# Patient Record
Sex: MALE | Race: WHITE | NOT HISPANIC OR LATINO | Employment: FULL TIME | URBAN - METROPOLITAN AREA
[De-identification: names, ages, dates, MRNs, and addresses within clinical notes are randomized per-mention and may not be internally consistent; named-entity substitution may affect disease eponyms.]

---

## 2019-02-27 ENCOUNTER — HOSPITAL ENCOUNTER (EMERGENCY)
Facility: HOSPITAL | Age: 39
Discharge: HOME | End: 2019-02-27
Attending: EMERGENCY MEDICINE
Payer: COMMERCIAL

## 2019-02-27 VITALS
TEMPERATURE: 98.3 F | OXYGEN SATURATION: 98 % | BODY MASS INDEX: 30.06 KG/M2 | DIASTOLIC BLOOD PRESSURE: 91 MMHG | RESPIRATION RATE: 18 BRPM | HEIGHT: 70 IN | WEIGHT: 210 LBS | SYSTOLIC BLOOD PRESSURE: 168 MMHG | HEART RATE: 68 BPM

## 2019-02-27 DIAGNOSIS — R31.9 HEMATURIA: Primary | ICD-10-CM

## 2019-02-27 LAB
ANION GAP SERPL CALCULATED.3IONS-SCNC: 9 MMOL/L (ref 4–13)
APTT PPP: 32 SECONDS (ref 26–38)
BASOPHILS # BLD AUTO: 0.06 THOUSANDS/ΜL (ref 0–0.1)
BASOPHILS NFR BLD AUTO: 1 % (ref 0–1)
BILIRUB UR QL STRIP: NEGATIVE
BUN SERPL-MCNC: 15 MG/DL (ref 5–25)
CALCIUM SERPL-MCNC: 8.7 MG/DL (ref 8.3–10.1)
CHLORIDE SERPL-SCNC: 104 MMOL/L (ref 100–108)
CLARITY UR: CLEAR
CO2 SERPL-SCNC: 29 MMOL/L (ref 21–32)
COLOR UR: YELLOW
CREAT SERPL-MCNC: 1.34 MG/DL (ref 0.6–1.3)
EOSINOPHIL # BLD AUTO: 0.19 THOUSAND/ΜL (ref 0–0.61)
EOSINOPHIL NFR BLD AUTO: 2 % (ref 0–6)
ERYTHROCYTE [DISTWIDTH] IN BLOOD BY AUTOMATED COUNT: 12.9 % (ref 11.6–15.1)
GFR SERPL CREATININE-BSD FRML MDRD: 66 ML/MIN/1.73SQ M
GLUCOSE SERPL-MCNC: 92 MG/DL (ref 65–140)
GLUCOSE UR STRIP-MCNC: NEGATIVE MG/DL
HCT VFR BLD AUTO: 47.6 % (ref 36.5–49.3)
HGB BLD-MCNC: 15.9 G/DL (ref 12–17)
HGB UR QL STRIP.AUTO: NEGATIVE
IMM GRANULOCYTES # BLD AUTO: 0.02 THOUSAND/UL (ref 0–0.2)
IMM GRANULOCYTES NFR BLD AUTO: 0 % (ref 0–2)
INR PPP: 1.02 (ref 0.86–1.17)
KETONES UR STRIP-MCNC: NEGATIVE MG/DL
LEUKOCYTE ESTERASE UR QL STRIP: NEGATIVE
LYMPHOCYTES # BLD AUTO: 2.88 THOUSANDS/ΜL (ref 0.6–4.47)
LYMPHOCYTES NFR BLD AUTO: 26 % (ref 14–44)
MCH RBC QN AUTO: 28.1 PG (ref 26.8–34.3)
MCHC RBC AUTO-ENTMCNC: 33.4 G/DL (ref 31.4–37.4)
MCV RBC AUTO: 84 FL (ref 82–98)
MONOCYTES # BLD AUTO: 0.75 THOUSAND/ΜL (ref 0.17–1.22)
MONOCYTES NFR BLD AUTO: 7 % (ref 4–12)
NEUTROPHILS # BLD AUTO: 7.15 THOUSANDS/ΜL (ref 1.85–7.62)
NEUTS SEG NFR BLD AUTO: 64 % (ref 43–75)
NITRITE UR QL STRIP: NEGATIVE
NRBC BLD AUTO-RTO: 0 /100 WBCS
PH UR STRIP.AUTO: 7 [PH] (ref 4.5–8)
PLATELET # BLD AUTO: 264 THOUSANDS/UL (ref 149–390)
PMV BLD AUTO: 10.5 FL (ref 8.9–12.7)
POTASSIUM SERPL-SCNC: 3.6 MMOL/L (ref 3.5–5.3)
PROT UR STRIP-MCNC: NEGATIVE MG/DL
PROTHROMBIN TIME: 12.8 SECONDS (ref 11.8–14.2)
RBC # BLD AUTO: 5.65 MILLION/UL (ref 3.88–5.62)
SODIUM SERPL-SCNC: 142 MMOL/L (ref 136–145)
SP GR UR STRIP.AUTO: 1.02 (ref 1–1.03)
UROBILINOGEN UR QL STRIP.AUTO: 0.2 E.U./DL
WBC # BLD AUTO: 11.05 THOUSAND/UL (ref 4.31–10.16)

## 2019-02-27 PROCEDURE — 99283 EMERGENCY DEPT VISIT LOW MDM: CPT

## 2019-02-27 PROCEDURE — 85730 THROMBOPLASTIN TIME PARTIAL: CPT | Performed by: EMERGENCY MEDICINE

## 2019-02-27 PROCEDURE — 81003 URINALYSIS AUTO W/O SCOPE: CPT | Performed by: EMERGENCY MEDICINE

## 2019-02-27 PROCEDURE — 85025 COMPLETE CBC W/AUTO DIFF WBC: CPT | Performed by: EMERGENCY MEDICINE

## 2019-02-27 PROCEDURE — 96360 HYDRATION IV INFUSION INIT: CPT

## 2019-02-27 PROCEDURE — 85610 PROTHROMBIN TIME: CPT | Performed by: EMERGENCY MEDICINE

## 2019-02-27 PROCEDURE — 36415 COLL VENOUS BLD VENIPUNCTURE: CPT | Performed by: EMERGENCY MEDICINE

## 2019-02-27 PROCEDURE — 87491 CHLMYD TRACH DNA AMP PROBE: CPT | Performed by: EMERGENCY MEDICINE

## 2019-02-27 PROCEDURE — 80048 BASIC METABOLIC PNL TOTAL CA: CPT | Performed by: EMERGENCY MEDICINE

## 2019-02-27 PROCEDURE — 87591 N.GONORRHOEAE DNA AMP PROB: CPT | Performed by: EMERGENCY MEDICINE

## 2019-02-27 RX ADMIN — SODIUM CHLORIDE 500 ML: 0.9 INJECTION, SOLUTION INTRAVENOUS at 15:00

## 2019-02-27 NOTE — ED PROVIDER NOTES
History  Chief Complaint   Patient presents with    Blood in Urine     Presents to ED for evaluation of one episode of hematuria 4 days ago after heavy lifting  Pain with urination noted at this time  Low back pain has resolved  Denies any fever or chills  History provided by:  Patient   used: No      Patient is a 40-year-old male presenting to emergency department with hematuria  On Thursday was lifting boxes  Had some lower back pain  Had bloody urine Friday  No back pain at this time  No back pain since Thursday  Is having intermittent dysuria  No fevers or chills  No nausea vomiting  No abdominal pain  No history of kidney stones  History of cyst on the kidney  Has not seen urologist     MDM check kidney function, UA, GC Chlamydia  Patient will follow up with Urology  None       History reviewed  No pertinent past medical history  History reviewed  No pertinent surgical history  History reviewed  No pertinent family history  I have reviewed and agree with the history as documented  Social History     Tobacco Use    Smoking status: Current Every Day Smoker     Packs/day: 0 50    Smokeless tobacco: Never Used   Substance Use Topics    Alcohol use: Yes     Comment: social    Drug use: Never        Review of Systems   Constitutional: Negative for chills, diaphoresis and fever  Respiratory: Negative for cough, shortness of breath, wheezing and stridor  Cardiovascular: Negative for chest pain, palpitations and leg swelling  Gastrointestinal: Negative for abdominal pain, blood in stool, diarrhea, nausea and vomiting  Genitourinary: Positive for dysuria and hematuria  Negative for frequency and urgency  Musculoskeletal: Negative for neck stiffness  Skin: Negative for pallor and rash  Neurological: Negative for dizziness, syncope, weakness, light-headedness and headaches  All other systems reviewed and are negative        Physical Exam  Physical Exam Constitutional: He is oriented to person, place, and time  He appears well-developed and well-nourished  HENT:   Head: Normocephalic and atraumatic  Eyes: Pupils are equal, round, and reactive to light  Neck: Neck supple  Cardiovascular: Normal rate, regular rhythm, normal heart sounds and intact distal pulses  Pulmonary/Chest: Effort normal and breath sounds normal  No respiratory distress  Abdominal: Soft  Bowel sounds are normal  There is no tenderness  Musculoskeletal: Normal range of motion  He exhibits no edema, tenderness or deformity  Neurological: He is alert and oriented to person, place, and time  Skin: Skin is warm and dry  Capillary refill takes less than 2 seconds  No rash noted  No erythema  No pallor  Vitals reviewed        Vital Signs  ED Triage Vitals [02/27/19 1421]   Temperature Pulse Respirations Blood Pressure SpO2   98 3 °F (36 8 °C) 73 20 (!) 175/96 98 %      Temp Source Heart Rate Source Patient Position - Orthostatic VS BP Location FiO2 (%)   Tympanic Monitor Sitting Right arm --      Pain Score       No Pain           Vitals:    02/27/19 1421 02/27/19 1602   BP: (!) 175/96 168/91   Pulse: 73 68   Patient Position - Orthostatic VS: Sitting Lying       Visual Acuity      ED Medications  Medications   sodium chloride 0 9 % bolus 500 mL (0 mL Intravenous Stopped 2/27/19 1555)       Diagnostic Studies  Results Reviewed     Procedure Component Value Units Date/Time    Protime-INR [[id number]]  (Normal) Collected:  02/27/19 1445    Lab Status:  Final result Specimen:  Blood from Line, Venous Updated:  02/27/19 1517     Protime 12 8 seconds      INR 1 02    APTT [[id number]]  (Normal) Collected:  02/27/19 1445    Lab Status:  Final result Specimen:  Blood from Line, Venous Updated:  02/27/19 1517     PTT 32 seconds     Basic metabolic panel [[id number]]  (Abnormal) Collected:  02/27/19 1445    Lab Status:  Final result Specimen:  Blood from Line, Venous Updated:  02/27/19 1515 Sodium 142 mmol/L      Potassium 3 6 mmol/L      Chloride 104 mmol/L      CO2 29 mmol/L      ANION GAP 9 mmol/L      BUN 15 mg/dL      Creatinine 1 34 mg/dL      Glucose 92 mg/dL      Calcium 8 7 mg/dL      eGFR 66 ml/min/1 73sq m     Narrative:       National Kidney Disease Education Program recommendations are as follows:  GFR calculation is accurate only with a steady state creatinine  Chronic Kidney disease less than 60 ml/min/1 73 sq  meters  Kidney failure less than 15 ml/min/1 73 sq  meters  UA w Reflex to Microscopic w Reflex to Culture [[id number]] Collected:  02/27/19 1440    Lab Status:  Final result Specimen:  Urine, Other Updated:  02/27/19 1515     Color, UA Yellow     Clarity, UA Clear     Specific [place], UA 1 020     pH, UA 7 0     Leukocytes, UA Negative     Nitrite, UA Negative     Protein, UA Negative mg/dl      Glucose, UA Negative mg/dl      Ketones, UA Negative mg/dl      Urobilinogen, UA 0 2 E U /dl      Bilirubin, UA Negative     Blood, UA Negative    Chlamydia/GC amplified DNA by PCR [[id number]] Collected:  02/27/19 1440    Lab Status:   In process Specimen:  Urine, Other Updated:  02/27/19 1511    CBC and differential [[id number]]  (Abnormal) Collected:  02/27/19 1445    Lab Status:  Final result Specimen:  Blood from Line, Venous Updated:  02/27/19 1504     WBC 11 05 Thousand/uL      RBC 5 65 Million/uL      Hemoglobin 15 9 g/dL      Hematocrit 47 6 %      MCV 84 fL      MCH 28 1 pg      MCHC 33 4 g/dL      RDW 12 9 %      MPV 10 5 fL      Platelets 138 Thousands/uL      nRBC 0 /100 WBCs      Neutrophils Relative 64 %      Immat GRANS % 0 %      Lymphocytes Relative 26 %      Monocytes Relative 7 %      Eosinophils Relative 2 %      Basophils Relative 1 %      Neutrophils Absolute 7 15 Thousands/µL      Immature Grans Absolute 0 02 Thousand/uL      Lymphocytes Absolute 2 88 Thousands/µL      Monocytes Absolute 0 75 Thousand/µL      Eosinophils Absolute 0 19 Thousand/µL      Basophils Absolute 0 06 Thousands/µL                  No orders to display              Procedures  Procedures       Phone Contacts  ED Phone Contact    ED Course                               MDM    Disposition  Final diagnoses:   Hematuria     Time reflects when diagnosis was documented in both MDM as applicable and the Disposition within this note     Time User Action Codes Description Comment    2/27/2019  3:36 PM Amber Kunz Add [R31 9] Hematuria       ED Disposition     ED Disposition Condition Date/Time Comment    Discharge Stable Wed Feb 27, 2019  3:36 PM Shayy Tanner discharge to home/self care  Follow-up Information     Follow up With Specialties Details Why Contact Info Additional [address] [facility] Emergency Department Emergency Medicine  As needed, If symptoms worsen, back pain, fevers, vomiting or feel worse overall [address]  3441 [facility] [address] ED, [address], [facility]47 4000 [facility] Urology [facility] Urology Schedule an appointment as soon as possible for a visit in 1 week Hematuria, known renal cysts [address] [address] Sammy ACUÑA [facility] [address]  [address] [place] [address] Urology [address], [address]  Call  To get a family doctor [phone]             There are no discharge medications for this patient  No discharge procedures on file      ED Provider  Electronically Signed by           Sri Richmond MD  02/27/19 2004

## 2019-02-28 LAB
C TRACH DNA SPEC QL NAA+PROBE: NEGATIVE
N GONORRHOEA DNA SPEC QL NAA+PROBE: NEGATIVE

## 2019-08-09 ENCOUNTER — HOSPITAL ENCOUNTER (EMERGENCY)
Facility: HOSPITAL | Age: 39
Discharge: HOME | End: 2019-08-09
Attending: EMERGENCY MEDICINE | Admitting: EMERGENCY MEDICINE

## 2019-08-09 VITALS
SYSTOLIC BLOOD PRESSURE: 132 MMHG | HEART RATE: 77 BPM | TEMPERATURE: 98.1 F | BODY MASS INDEX: 30.15 KG/M2 | WEIGHT: 210.1 LBS | DIASTOLIC BLOOD PRESSURE: 77 MMHG | RESPIRATION RATE: 20 BRPM | OXYGEN SATURATION: 100 %

## 2019-08-09 DIAGNOSIS — H11.31 NON-TRAUMATIC SUBCONJUNCTIVAL HEMORRHAGE OF RIGHT EYE: ICD-10-CM

## 2019-08-09 DIAGNOSIS — M54.42 ACUTE BILATERAL LOW BACK PAIN WITH LEFT-SIDED SCIATICA: Primary | ICD-10-CM

## 2019-08-09 PROCEDURE — 99283 EMERGENCY DEPT VISIT LOW MDM: CPT

## 2019-08-09 PROCEDURE — 99284 EMERGENCY DEPT VISIT MOD MDM: CPT | Performed by: PHYSICIAN ASSISTANT

## 2019-08-09 RX ORDER — CYCLOBENZAPRINE HCL 10 MG
10 TABLET ORAL 2 TIMES DAILY PRN
Qty: 15 TABLET | Refills: 0 | Status: SHIPPED | OUTPATIENT
Start: 2019-08-09 | End: 2019-09-08

## 2019-08-09 RX ORDER — MELOXICAM 15 MG/1
15 TABLET ORAL DAILY
Qty: 14 TABLET | Refills: 0 | Status: SHIPPED | OUTPATIENT
Start: 2019-08-09 | End: 2019-09-08

## 2019-08-09 NOTE — ED PROVIDER NOTES
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 and while in one of the rooms, he suddenly developed R eye discomfort and blurred vision  This occurred yesterday  It has not improved  Does not wear contacts  None       Past Medical History:   Diagnosis Date    Chronic pain        History reviewed  No pertinent surgical history  History reviewed  No pertinent family history  I have reviewed and agree with the history as documented  Social History     Tobacco Use    Smoking status: Current Every Day Smoker     Packs/day: 0 50    Smokeless tobacco: Never Used   Substance Use Topics    Alcohol use: Yes     Comment: social    Drug use: Never        Review of Systems   Constitutional: Negative for appetite change, chills, diaphoresis and fever  Eyes: Positive for pain, redness and visual disturbance  Negative for photophobia and discharge  Respiratory: Negative for shortness of breath  Cardiovascular: Negative for chest pain  Gastrointestinal: Negative for diarrhea, nausea and vomiting  Genitourinary: Negative for difficulty urinating  Musculoskeletal: Positive for back pain  Negative for arthralgias and myalgias  Skin: Negative for color change and rash  Allergic/Immunologic: Negative for immunocompromised state  Neurological: Negative for weakness and numbness  Psychiatric/Behavioral: Negative for confusion         Physical Exam  Physical Exam Constitutional: He is oriented to person, place, and time  He appears well-developed and well-nourished  No distress  HENT:   Head: Normocephalic and atraumatic  Mouth/Throat: Oropharynx is clear and moist    Eyes: EOM are normal  Right eye exhibits no chemosis  Left eye exhibits no chemosis  Right conjunctiva is not injected  Left conjunctiva is not injected  No scleral icterus  Small R subconjunctival hemorrhage lateral to the iris  No fluorescein dye uptake, no hyphema/hypopyon    OS 20/20  OD 20/30  OU 20/20   Neck: No JVD present  Cardiovascular: Normal rate and regular rhythm  Exam reveals no gallop and no friction rub  No murmur heard  Pulmonary/Chest: No respiratory distress  He has no wheezes  He has no rales  Abdominal: Soft  Bowel sounds are normal  He exhibits no distension and no mass  There is no tenderness  There is no guarding  Musculoskeletal:        Lumbar back: He exhibits tenderness (L paraspinal)  He exhibits normal range of motion, no bony tenderness and no deformity  Neurological: He is alert and oriented to person, place, and time  Skin: Skin is warm and dry  Capillary refill takes less than 2 seconds  He is not diaphoretic  Psychiatric: He has a normal mood and affect  His behavior is normal    Vitals reviewed        Vital Signs  ED Triage Vitals [08/09/19 1330]   Temperature Pulse Respirations Blood Pressure SpO2   98 1 °F (36 7 °C) 77 20 132/77 100 %      Temp Source Heart Rate Source Patient Position - Orthostatic VS BP Location FiO2 (%)   Tympanic Monitor Sitting Right arm --      Pain Score       7           Vitals:    08/09/19 1330   BP: 132/77   Pulse: 77   Patient Position - Orthostatic VS: Sitting         Visual Acuity      ED Medications  Medications - No data to display    Diagnostic Studies  Results Reviewed     None                 No orders to display              Procedures  Procedures       ED Course                               MDM  Number of Diagnoses or Management Options  Acute bilateral low back pain with left-sided sciatica: new and does not require workup  Non-traumatic subconjunctival hemorrhage of right eye: new and does not require workup  Diagnosis management comments: 35 male presents w/ mechanical LBP with L sided radiculopathy  No evidence of iritis/keratitis on ocular exam, visual acuity intact  Disposition  Final diagnoses:   Acute bilateral low back pain with left-sided sciatica   Non-traumatic subconjunctival hemorrhage of right eye     Time reflects when diagnosis was documented in both MDM as applicable and the Disposition within this note     Time User Action Codes Description Comment    8/9/2019  2:04 PM Tere Hancock Add [M54 42] Acute bilateral low back pain with left-sided sciatica     8/9/2019  2:04 PM Tere Hancock Add [H11 31] Non-traumatic subconjunctival hemorrhage of right eye       ED Disposition     ED Disposition Condition Date/Time Comment    Discharge Stable Fri Aug 9, 2019  2:04 PM Quiana Grounds discharge to home/self care  Follow-up Information     Follow up With Specialties Details Why Contact Info Additional [address] [facility]   [address] [facility] [address] [facility] 124 [facility] 60 , [address], [facility]          Discharge Medication List as of 8/9/2019  2:05 PM      START taking these medications    Details   cyclobenzaprine (FLEXERIL) 10 mg tablet Take 1 tablet (10 mg total) by mouth 2 (two) times a day as needed for muscle spasms, Starting Fri 8/9/2019, Print      meloxicam (MOBIC) 15 mg tablet Take 1 tablet (15 mg total) by mouth daily for 14 days, Starting Fri 8/9/2019, Until Fri 8/23/2019, Print           No discharge procedures on file      ED Provider  Electronically Signed by           Florida Stevens PA-C  08/11/19 8280

## 2019-09-08 ENCOUNTER — APPOINTMENT (EMERGENCY)
Dept: RADIOLOGY | Facility: HOSPITAL | Age: 39
End: 2019-09-08
Payer: COMMERCIAL

## 2019-09-08 ENCOUNTER — HOSPITAL ENCOUNTER (EMERGENCY)
Facility: HOSPITAL | Age: 39
Discharge: HOME | End: 2019-09-08
Attending: EMERGENCY MEDICINE | Admitting: EMERGENCY MEDICINE
Payer: COMMERCIAL

## 2019-09-08 VITALS
HEART RATE: 63 BPM | HEIGHT: 70 IN | BODY MASS INDEX: 30.08 KG/M2 | SYSTOLIC BLOOD PRESSURE: 169 MMHG | OXYGEN SATURATION: 98 % | WEIGHT: 210.1 LBS | TEMPERATURE: 97.5 F | DIASTOLIC BLOOD PRESSURE: 91 MMHG | RESPIRATION RATE: 16 BRPM

## 2019-09-08 DIAGNOSIS — S43.102A AC SEPARATION, LEFT, INITIAL ENCOUNTER: ICD-10-CM

## 2019-09-08 DIAGNOSIS — J02.9 PHARYNGITIS: Primary | ICD-10-CM

## 2019-09-08 LAB — S PYO AG THROAT QL: NEGATIVE

## 2019-09-08 PROCEDURE — 87430 STREP A AG IA: CPT | Performed by: PHYSICIAN ASSISTANT

## 2019-09-08 PROCEDURE — 73030 X-RAY EXAM OF SHOULDER: CPT

## 2019-09-08 PROCEDURE — 99283 EMERGENCY DEPT VISIT LOW MDM: CPT

## 2019-09-08 PROCEDURE — 99285 EMERGENCY DEPT VISIT HI MDM: CPT | Performed by: PHYSICIAN ASSISTANT

## 2019-09-09 NOTE — DISCHARGE INSTRUCTIONS
Rest, ice, elevate arm  Motrin 600mg every 6 hours as needed for pain  Follow up with ortho this week for recheck

## 2019-09-09 NOTE — ED PROVIDER NOTES
History  Chief Complaint   Patient presents with    Neck Pain     patient presents to ED with left sided neck pain, tenderness for the past month  Pt states sharp pain beginning approx one month ago after smoking a full pack of cigarettes   Shoulder Pain     Patient states he was at work approx one month ago at work developed sharp pain in left collar bone and left shoulder after lifting 250 lb heater     Patient is a 34 y/o M that presents to the ED with left shoulder pain x 3 weeks  He states he hurt it at work lifting a 250lb heater  Pain is worse with movement, better with rest  He has not taken anything for pain  He states he has tingling in his fingertips  No prior injury to left shoulder  He is left handed  He also c/o enlarged lymph node x 3weeks on left side of neck  He states he has had a sore throat that started after smoking too many cigarettes  He also had a dental procedure left lower tooth and has had sensitivity in that tooth since the procedure  He denies facial swelling  No trouble swallowing or breathing         History provided by:  Patient  Neck Pain   Pain location:  L side  Quality:  Aching  Pain radiates to:  Does not radiate  Pain severity:  Mild  Onset quality:  Gradual  Duration:  3 weeks  Timing:  Constant  Progression:  Unchanged  Chronicity:  New  Relieved by:  Nothing  Worsened by:  Nothing  Ineffective treatments:  None tried  Associated symptoms: no bladder incontinence, no bowel incontinence, no chest pain, no fever, no leg pain, no numbness, no tingling and no weakness    Shoulder Pain   Location:  Shoulder  Shoulder location:  L shoulder  Injury: yes    Time since incident:  3 weeks  Mechanism of injury comment:  Heavy lifting  Pain details:     Quality:  Aching    Radiates to:  Does not radiate    Severity:  Mild    Onset quality:  Gradual    Duration:  3 weeks    Timing:  Constant    Progression:  Unchanged  Handedness:  Left-handed  Dislocation: no    Prior injury to area:  No  Relieved by:  Rest  Worsened by: Movement  Ineffective treatments:  None tried  Associated symptoms: neck pain and tingling (in fingertips)    Associated symptoms: no back pain, no decreased range of motion, no fever, no numbness and no swelling        None       Past Medical History:   Diagnosis Date    Chronic pain        History reviewed  No pertinent surgical history  History reviewed  No pertinent family history  I have reviewed and agree with the history as documented  Social History     Tobacco Use    Smoking status: Current Every Day Smoker     Packs/day: 0 50    Smokeless tobacco: Never Used   Substance Use Topics    Alcohol use: Yes     Comment: social    Drug use: Never        Review of Systems   Constitutional: Negative for chills and fever  HENT: Positive for sore throat  Respiratory: Negative for cough and shortness of breath  Cardiovascular: Negative for chest pain and leg swelling  Gastrointestinal: Negative for bowel incontinence  Genitourinary: Negative for bladder incontinence  Musculoskeletal: Positive for neck pain  Negative for back pain  Skin: Negative for color change and rash  Neurological: Negative for dizziness, tingling, weakness and numbness  Psychiatric/Behavioral: Negative for confusion  All other systems reviewed and are negative  Physical Exam  Physical Exam   Constitutional: He is oriented to person, place, and time  He appears well-developed and well-nourished  He is cooperative  He does not appear ill  No distress  HENT:   Head: Normocephalic and atraumatic  Nose: Nose normal    Mouth/Throat: Oropharynx is clear and moist and mucous membranes are normal  No tonsillar exudate  TM obscured by cerumen B/L  Eyes: Conjunctivae are normal    Neck: Normal range of motion  Neck supple  No spinous process tenderness and no muscular tenderness present  No thyroid mass present     Cardiovascular: Normal rate, regular rhythm and normal heart sounds  No murmur heard  Pulmonary/Chest: Effort normal and breath sounds normal  He has no wheezes  He has no rhonchi  He has no rales  Abdominal: Soft  Normal appearance and bowel sounds are normal  There is no tenderness  Musculoskeletal:        Left shoulder: He exhibits tenderness and bony tenderness  He exhibits normal range of motion, no swelling, no deformity and normal pulse  Left elbow: Normal    Lymphadenopathy:     He has cervical adenopathy  Left cervical: Superficial cervical adenopathy present  Neurological: He is alert and oriented to person, place, and time  He has normal strength  No sensory deficit  Skin: Skin is warm and dry  No rash noted  He is not diaphoretic  No erythema  No pallor  Nursing note and vitals reviewed  Vital Signs  ED Triage Vitals [09/08/19 2021]   Temperature Pulse Respirations Blood Pressure SpO2   97 5 °F (36 4 °C) 63 16 169/91 98 %      Temp Source Heart Rate Source Patient Position - Orthostatic VS BP Location FiO2 (%)   Tympanic Monitor Lying Right arm --      Pain Score       --           Vitals:    09/08/19 2021   BP: 169/91   Pulse: 63   Patient Position - Orthostatic VS: Lying         Visual Acuity      ED Medications  Medications - No data to display    Diagnostic Studies  Results Reviewed     Procedure Component Value Units Date/Time    Rapid Strep A Screen Only, Adults [[id number]]  (Normal) Collected:  09/08/19 2059    Lab Status:  Final result Specimen:  Throat Updated:  09/08/19 2153     Rapid Strep A Screen Negative                 XR shoulder 2+ views LEFT   ED Interpretation by Merly Billings PA-C (09/08 2202)   AC separation                    Procedures  Procedures       ED Course                               MDM  Number of Diagnoses or Management Options  AC separation, left, initial encounter: new and requires workup  Pharyngitis: new and requires workup     Amount and/or Complexity of Data Reviewed  Clinical lab tests: ordered and reviewed  Tests in the radiology section of CPT®: reviewed and ordered  Independent visualization of images, tracings, or specimens: yes    Patient Progress  Patient progress: stable      Disposition  Final diagnoses:   Pharyngitis   AC separation, left, initial encounter     Time reflects when diagnosis was documented in both MDM as applicable and the Disposition within this note     Time User Action Codes Description Comment    9/8/2019 10:02 PM Jaylin Ly Add [J02 9] Pharyngitis     9/8/2019 10:02 PM Jaylin Ly Add [S43 102A] AC separation, left, initial encounter       ED Disposition     ED Disposition Condition Date/Time Comment    Discharge Stable Sun Sep 8, 2019 10:02 PM Ricardo Rankin discharge to home/self care  Follow-up Information     Follow up With Specialties Details Why Contact Info    Mana Stewart MD Orthopedic Surgery Call in 1 day For recheck Minor Mayes [phone] [facility]  [phone]            Patient's Medications   Discharge Prescriptions    No medications on file     No discharge procedures on file      ED Provider  Electronically Signed by           Yahir Sharp PA-C  09/08/19 1439

## 2019-09-26 ENCOUNTER — APPOINTMENT (EMERGENCY)
Dept: RADIOLOGY | Facility: HOSPITAL | Age: 39
End: 2019-09-26
Payer: COMMERCIAL

## 2019-09-26 ENCOUNTER — HOSPITAL ENCOUNTER (EMERGENCY)
Facility: HOSPITAL | Age: 39
Discharge: HOME | End: 2019-09-27
Attending: EMERGENCY MEDICINE
Payer: COMMERCIAL

## 2019-09-26 DIAGNOSIS — R07.89 ATYPICAL CHEST PAIN: ICD-10-CM

## 2019-09-26 DIAGNOSIS — R03.0 ELEVATED BLOOD PRESSURE READING: ICD-10-CM

## 2019-09-26 DIAGNOSIS — K21.9 GERD (GASTROESOPHAGEAL REFLUX DISEASE): Primary | ICD-10-CM

## 2019-09-26 LAB
ALBUMIN SERPL BCP-MCNC: 4.1 G/DL (ref 3.5–5)
ALP SERPL-CCNC: 103 U/L (ref 46–116)
ALT SERPL W P-5'-P-CCNC: 52 U/L (ref 12–78)
ANION GAP SERPL CALCULATED.3IONS-SCNC: 9 MMOL/L (ref 4–13)
AST SERPL W P-5'-P-CCNC: 20 U/L (ref 5–45)
BASOPHILS # BLD AUTO: 0.08 THOUSANDS/ΜL (ref 0–0.1)
BASOPHILS NFR BLD AUTO: 1 % (ref 0–1)
BILIRUB SERPL-MCNC: 0.4 MG/DL (ref 0.2–1)
BUN SERPL-MCNC: 17 MG/DL (ref 5–25)
CALCIUM SERPL-MCNC: 9.2 MG/DL (ref 8.3–10.1)
CHLORIDE SERPL-SCNC: 103 MMOL/L (ref 100–108)
CO2 SERPL-SCNC: 30 MMOL/L (ref 21–32)
CREAT SERPL-MCNC: 1.27 MG/DL (ref 0.6–1.3)
EOSINOPHIL # BLD AUTO: 0.31 THOUSAND/ΜL (ref 0–0.61)
EOSINOPHIL NFR BLD AUTO: 2 % (ref 0–6)
ERYTHROCYTE [DISTWIDTH] IN BLOOD BY AUTOMATED COUNT: 13.7 % (ref 11.6–15.1)
GFR SERPL CREATININE-BSD FRML MDRD: 70 ML/MIN/1.73SQ M
GLUCOSE SERPL-MCNC: 102 MG/DL (ref 65–140)
HCT VFR BLD AUTO: 51.2 % (ref 36.5–49.3)
HGB BLD-MCNC: 16.7 G/DL (ref 12–17)
IMM GRANULOCYTES # BLD AUTO: 0.08 THOUSAND/UL (ref 0–0.2)
IMM GRANULOCYTES NFR BLD AUTO: 1 % (ref 0–2)
LIPASE SERPL-CCNC: 240 U/L (ref 73–393)
LYMPHOCYTES # BLD AUTO: 4.74 THOUSANDS/ΜL (ref 0.6–4.47)
LYMPHOCYTES NFR BLD AUTO: 31 % (ref 14–44)
MCH RBC QN AUTO: 27.6 PG (ref 26.8–34.3)
MCHC RBC AUTO-ENTMCNC: 32.6 G/DL (ref 31.4–37.4)
MCV RBC AUTO: 85 FL (ref 82–98)
MONOCYTES # BLD AUTO: 1.45 THOUSAND/ΜL (ref 0.17–1.22)
MONOCYTES NFR BLD AUTO: 10 % (ref 4–12)
NEUTROPHILS # BLD AUTO: 8.44 THOUSANDS/ΜL (ref 1.85–7.62)
NEUTS SEG NFR BLD AUTO: 55 % (ref 43–75)
NRBC BLD AUTO-RTO: 0 /100 WBCS
PLATELET # BLD AUTO: 273 THOUSANDS/UL (ref 149–390)
PMV BLD AUTO: 10.4 FL (ref 8.9–12.7)
POTASSIUM SERPL-SCNC: 3.6 MMOL/L (ref 3.5–5.3)
PROT SERPL-MCNC: 7.6 G/DL (ref 6.4–8.2)
RBC # BLD AUTO: 6.06 MILLION/UL (ref 3.88–5.62)
SODIUM SERPL-SCNC: 142 MMOL/L (ref 136–145)
TROPONIN I SERPL-MCNC: <0.02 NG/ML
WBC # BLD AUTO: 15.1 THOUSAND/UL (ref 4.31–10.16)

## 2019-09-26 PROCEDURE — 83690 ASSAY OF LIPASE: CPT | Performed by: EMERGENCY MEDICINE

## 2019-09-26 PROCEDURE — 99285 EMERGENCY DEPT VISIT HI MDM: CPT

## 2019-09-26 PROCEDURE — 71046 X-RAY EXAM CHEST 2 VIEWS: CPT

## 2019-09-26 PROCEDURE — 84484 ASSAY OF TROPONIN QUANT: CPT | Performed by: EMERGENCY MEDICINE

## 2019-09-26 PROCEDURE — 85025 COMPLETE CBC W/AUTO DIFF WBC: CPT | Performed by: EMERGENCY MEDICINE

## 2019-09-26 PROCEDURE — 80053 COMPREHEN METABOLIC PANEL: CPT | Performed by: EMERGENCY MEDICINE

## 2019-09-26 PROCEDURE — 99284 EMERGENCY DEPT VISIT MOD MDM: CPT | Performed by: EMERGENCY MEDICINE

## 2019-09-26 PROCEDURE — 93005 ELECTROCARDIOGRAM TRACING: CPT

## 2019-09-26 PROCEDURE — 36415 COLL VENOUS BLD VENIPUNCTURE: CPT | Performed by: EMERGENCY MEDICINE

## 2019-09-26 RX ORDER — MAGNESIUM HYDROXIDE/ALUMINUM HYDROXICE/SIMETHICONE 120; 1200; 1200 MG/30ML; MG/30ML; MG/30ML
30 SUSPENSION ORAL ONCE
Status: COMPLETED | OUTPATIENT
Start: 2019-09-26 | End: 2019-09-26

## 2019-09-26 RX ORDER — LIDOCAINE HYDROCHLORIDE 20 MG/ML
10 SOLUTION OROPHARYNGEAL ONCE
Status: COMPLETED | OUTPATIENT
Start: 2019-09-26 | End: 2019-09-26

## 2019-09-26 RX ADMIN — LIDOCAINE HYDROCHLORIDE 10 ML: 20 SOLUTION ORAL; TOPICAL at 23:07

## 2019-09-26 RX ADMIN — ALUMINUM HYDROXIDE, MAGNESIUM HYDROXIDE, AND SIMETHICONE 30 ML: 200; 200; 20 SUSPENSION ORAL at 23:07

## 2019-09-27 VITALS
DIASTOLIC BLOOD PRESSURE: 92 MMHG | SYSTOLIC BLOOD PRESSURE: 155 MMHG | TEMPERATURE: 98.2 F | HEART RATE: 59 BPM | RESPIRATION RATE: 20 BRPM | OXYGEN SATURATION: 96 %

## 2019-09-27 LAB
ATRIAL RATE: 61 BPM
P AXIS: 44 DEGREES
PR INTERVAL: 136 MS
QRS AXIS: 43 DEGREES
QRSD INTERVAL: 94 MS
QT INTERVAL: 408 MS
QTC INTERVAL: 410 MS
T WAVE AXIS: 78 DEGREES
VENTRICULAR RATE: 61 BPM

## 2019-09-27 PROCEDURE — 93010 ELECTROCARDIOGRAM REPORT: CPT | Performed by: INTERNAL MEDICINE

## 2019-09-27 RX ORDER — PANTOPRAZOLE SODIUM 40 MG/1
40 TABLET, DELAYED RELEASE ORAL ONCE
Status: COMPLETED | OUTPATIENT
Start: 2019-09-27 | End: 2019-09-27

## 2019-09-27 RX ORDER — PANTOPRAZOLE SODIUM 20 MG/1
20 TABLET, DELAYED RELEASE ORAL DAILY
Qty: 12 TABLET | Refills: 0 | Status: SHIPPED | OUTPATIENT
Start: 2019-09-27 | End: 2019-10-25

## 2019-09-27 RX ADMIN — PANTOPRAZOLE SODIUM 40 MG: 40 TABLET, DELAYED RELEASE ORAL at 00:11

## 2019-09-27 NOTE — DISCHARGE INSTRUCTIONS
Chest Pain   WHAT YOU NEED TO KNOW:   Chest pain can be caused by a range of conditions, from not serious to life-threatening  Chest pain can be a symptom of a digestive problem, such as acid reflux or a stomach ulcer  An anxiety attack or a strong emotion, such as anger, can also cause chest pain  Infection, inflammation, or a fracture in the bones or cartilage in your chest can cause pain or discomfort  Sometimes chest pain or pressure is caused by poor blood flow to your heart (angina)  Chest pain may also be caused by life-threatening conditions such as a heart attack or blood clot in your lungs  DISCHARGE INSTRUCTIONS:   Call 911 if:   · You have any of the following signs of a heart attack:      ¨ Squeezing, pressure, or pain in your chest that lasts longer than 5 minutes or returns    ¨ Discomfort or pain in your back, neck, jaw, stomach, or arm     ¨ Trouble breathing    ¨ Nausea or vomiting    ¨ Lightheadedness or a sudden cold sweat, especially with chest pain or trouble breathing    Return to the emergency department if:   · You have chest discomfort that gets worse, even with medicine  · You cough or vomit blood  · Your bowel movements are black or bloody  · You cannot stop vomiting, or it hurts to swallow  Contact your healthcare provider if:   · You have questions or concerns about your condition or care  Medicines:   · Medicines  may be given to treat the cause of your chest pain  Examples include pain medicine, anxiety medicine, or medicines to increase blood flow to your heart  · Do not take certain medicines without asking your healthcare provider first   These include NSAIDs, herbal or vitamin supplements, or hormones (estrogen or progestin)  · Take your medicine as directed  Contact your healthcare provider if you think your medicine is not helping or if you have side effects  Tell him or her if you are allergic to any medicine   Keep a list of the medicines, vitamins, and herbs you take  Include the amounts, and when and why you take them  Bring the list or the pill bottles to follow-up visits  Carry your medicine list with you in case of an emergency  Follow up with your healthcare provider within 72 hours, or as directed: You may need to return for more tests to find the cause of your chest pain  You may be referred to a specialist, such as a cardiologist or gastroenterologist  Write down your questions so you remember to ask them during your visits  Healthy living tips: The following are general healthy guidelines  If your chest pain is caused by a heart problem, your healthcare provider will give you specific guidelines to follow  · Do not smoke  Nicotine and other chemicals in cigarettes and cigars can cause lung and heart damage  Ask your healthcare provider for information if you currently smoke and need help to quit  E-cigarettes or smokeless tobacco still contain nicotine  Talk to your healthcare provider before you use these products  · Eat a variety of healthy, low-fat foods  Healthy foods include fruits, vegetables, whole-grain breads, low-fat dairy products, beans, lean meats, and fish  Ask for more information about a heart healthy diet  · Ask about activity  Your healthcare provider will tell you which activities to limit or avoid  Ask when you can drive, return to work, and have sex  Ask about the best exercise plan for you  · Maintain a healthy weight  Ask your healthcare provider how much you should weigh  Ask him or her to help you create a weight loss plan if you are overweight  · Get the flu and pneumonia vaccines  All adults should get the influenza (flu) vaccine  Get it every year as soon as it becomes available  The pneumococcal vaccine is given to adults aged 72 years or older  The vaccine is given every 5 years to prevent pneumococcal disease, such as pneumonia    © 2017 [employer] Information is for End User's use only and may not be sold, redistributed or otherwise used for commercial purposes  All illustrations and images included in CareNotes® are the copyrighted property of [employer]  or Mason Whaley  The above information is an  only  It is not intended as medical advice for individual conditions or treatments  Talk to your doctor, nurse or pharmacist before following any medical regimen to see if it is safe and effective for you  Gastroesophageal Reflux Disease   WHAT YOU NEED TO KNOW:   What is gastroesophageal reflux disease? Gastroesophageal reflux occurs when acid and food in the stomach back up into the esophagus  Gastroesophageal reflux disease (GERD) is reflux that occurs more than twice a week for a few weeks  It usually causes heartburn and other symptoms  GERD can cause other health problems over time if it is not treated  What causes GERD? GERD often occurs when the lower muscle (sphincter) of the esophagus does not close properly  The sphincter normally opens to let food into the stomach  It then closes to keep food and stomach acid in the stomach  If the sphincter does not close properly, stomach acid and food back up (reflux) into the esophagus  The following may increase your risk for GERD:  · Certain foods such as spicy foods, chocolate, foods that contain caffeine, peppermint, and fried foods    · Hiatal hernia    · Certain medicines such as calcium channel blockers (used to treat high blood pressure), allergy medicines, sedatives, or antidepressants    · Pregnancy or obesity    · Lying down after a meal    · Drinking alcohol or smoking  What are the signs and symptoms of GERD? Heartburn is the most common symptom of GERD  You may feel burning pain in your chest or below the breast bone  This usually occurs after meals and spreads to your neck, jaw, or shoulder  The pain gets better when you change positions   You may also have any of the following:  · Bitter or acid taste in your mouth    · Dry cough    · Trouble swallowing or pain with swallowing    · Hoarseness or sore throat    · Frequent burping or hiccups    · Feeling of fullness soon after you start eating  How is GERD diagnosed? Your healthcare provider will ask about your symptoms and when they started  Tell him or her about other medical conditions you have, your eating habits, and your activities  You may also need any of the following:  · Esophageal pH monitoring  is used to place a small probe inside your esophagus and stomach to check the amount of acid  · An endoscopy  is a procedure used to look at the inside of your esophagus and stomach  An endoscope is a bendable tube with a light and camera on the end  Your healthcare provider may remove a small sample of tissue and send it to a lab for tests  · Upper GI x-rays  are done to take pictures of your stomach and intestines (bowel)  You may be given a chalky liquid to drink before the pictures are taken  This liquid helps your stomach and intestines show up better on the x-rays  · Esophageal manometry  is a test that shows how your esophagus pushes food and fluid to your stomach  It also shows the pressures in your esophagus and stomach  It may show a hiatal hernia  How is GERD treated? · Medicines  are used to decrease stomach acid  Medicine may also be used to help your lower esophageal sphincter and stomach contract (tighten) more  · Surgery  is done to wrap the upper part of the stomach around the esophageal sphincter  This will strengthen the sphincter and prevent reflux  How can I help manage GERD? · Do not have foods or drinks that may increase heartburn  These include chocolate, peppermint, fried or fatty foods, drinks that contain caffeine, or carbonated drinks (soda)  Other foods include spicy foods, onions, tomatoes, and tomato-based foods   Do not have foods or drinks that can irritate your esophagus, such as citrus fruits, juices, and alcohol  · Do not eat large meals  When you eat a lot of food at one time, your stomach needs more acid to digest it  Eat 6 small meals each day instead of 3 large ones, and eat slowly  Do not eat meals 2 to 3 hours before bedtime  · Elevate the head of your bed  Place 6-inch blocks under the head of your bed frame  You may also use more than one pillow under your head and shoulders while you sleep  · Maintain a healthy weight  If you are overweight, weight loss may help relieve symptoms of GERD  · Do not smoke  Smoking weakens the lower esophageal sphincter and increases the risk of GERD  Ask your healthcare provider for information if you currently smoke and need help to quit  E-cigarettes or smokeless tobacco still contain nicotine  Talk to your healthcare provider before you use these products  · Do not wear clothing that is tight around your waist   Tight clothing can put pressure on your stomach and cause or worsen GERD symptoms  When should I seek immediate care? · You feel full and cannot burp or vomit  · You have severe chest pain and sudden trouble breathing  · Your bowel movements are black, bloody, or tarry-looking  · Your vomit looks like coffee grounds or has blood in it  When should I contact my healthcare provider? · You vomit large amounts, or you vomit often  · You have trouble breathing after you vomit  · You have trouble swallowing, or pain with swallowing  · You are losing weight without trying  · Your symptoms get worse or do not improve with treatment  · You have questions or concerns about your condition or care  CARE AGREEMENT:   You have the right to help plan your care  Learn about your health condition and how it may be treated  Discuss treatment options with your caregivers to decide what care you want to receive  You always have the right to refuse treatment   The above information is an educational aid only  It is not intended as medical advice for individual conditions or treatments  Talk to your doctor, nurse or pharmacist before following any medical regimen to see if it is safe and effective for you  © 2017 [facility] Information is for End User's use only and may not be sold, redistributed or otherwise used for commercial purposes  All illustrations and images included in CareNotes® are the copyrighted property of [employer]  or Mason Whaley

## 2019-09-27 NOTE — ED PROVIDER NOTES
History  Chief Complaint   Patient presents with    Chest Pain     Patient states he has had CP/acid reflux x1 mo       35year old male presents for evaluation of burning substernal chest pain which has been intermittent for the past month  Patient has been taking Pepcid OTC for these symptoms once daily which usually provides relief; however, did not help symptoms this evening when he took the medication around 6 pm  Patient states he had been seen for this previously and symptoms resolved with GI cocktail; however, he was never evaluated by a gastroenterologist for this  Patient states he has been told that he has elevated blood pressure in the past, but has not followed up for this  Patient does not currently have a PCP and has been receiving all of his medical care from the emergency department  He denies back pain, abdominal pain, shortness of breath, nausea or vomiting  No hematochezia or melena  History provided by:  Patient  Chest Pain   Pain location:  Substernal area  Pain quality: burning    Pain radiates to:  Does not radiate  Pain radiates to the back: no    Pain severity:  Moderate  Onset quality:  Gradual  Duration:  1 month  Timing:  Intermittent  Progression:  Worsening  Chronicity:  Recurrent  Relieved by:  Nothing  Worsened by:  Nothing tried  Ineffective treatments: Pepcid  Associated symptoms: no abdominal pain, no cough, no diaphoresis, no fatigue, no fever, no headache, no nausea, no palpitations, no shortness of breath, not vomiting and no weakness    Risk factors: smoking        None       Past Medical History:   Diagnosis Date    Chronic pain     GERD (gastroesophageal reflux disease)     Hypertension        History reviewed  No pertinent surgical history  History reviewed  No pertinent family history  I have reviewed and agree with the history as documented      Social History     Tobacco Use    Smoking status: Current Every Day Smoker     Packs/day: 1 00    Smokeless tobacco: Never Used   Substance Use Topics    Alcohol use: Yes     Comment: social    Drug use: Never        Review of Systems   Constitutional: Negative for appetite change, diaphoresis, fatigue and fever  HENT: Negative for congestion, rhinorrhea and sore throat  Respiratory: Negative for cough, chest tightness and shortness of breath  Cardiovascular: Positive for chest pain  Negative for palpitations and leg swelling  Gastrointestinal: Negative for abdominal pain, constipation, diarrhea, nausea and vomiting  Genitourinary: Negative for difficulty urinating, dysuria, frequency and hematuria  Musculoskeletal: Negative for myalgias, neck pain and neck stiffness  Skin: Negative for pallor  Neurological: Negative for syncope, weakness and headaches  All other systems reviewed and are negative  Physical Exam  Physical Exam   Constitutional: He appears well-developed and well-nourished  Non-toxic appearance  No distress  HENT:   Head: Normocephalic and atraumatic  Eyes: Pupils are equal, round, and reactive to light  EOM are normal    Neck: Normal range of motion  No tracheal deviation present  No thyromegaly present  Cardiovascular: Normal rate, regular rhythm, normal heart sounds and intact distal pulses  Pulmonary/Chest: Effort normal and breath sounds normal    Abdominal: Soft  Bowel sounds are normal  He exhibits no distension  There is no tenderness  Lymphadenopathy:     He has no cervical adenopathy  Skin: Skin is warm and dry  He is not diaphoretic  Nursing note and vitals reviewed        Vital Signs  ED Triage Vitals   Temperature Pulse Respirations Blood Pressure SpO2   09/26/19 2256 09/26/19 2256 09/26/19 2256 09/26/19 2256 09/26/19 2256   98 2 °F (36 8 °C) 83 18 (!) 227/110 100 %      Temp Source Heart Rate Source Patient Position - Orthostatic VS BP Location FiO2 (%)   09/26/19 2256 09/26/19 2256 09/26/19 2256 09/26/19 2256 --   Temporal Monitor Sitting Left arm Pain Score       09/26/19 2255       7           Vitals:    09/26/19 2300 09/26/19 2330 09/26/19 2345 09/27/19 0000   BP: (!) 227/112 (!) 181/100 (!) 173/100 155/92   Pulse: 82 (!) 54 (!) 53 59   Patient Position - Orthostatic VS: Lying Lying Lying Lying         Visual Acuity      ED Medications  Medications   Lidocaine Viscous HCl (XYLOCAINE) 2 % mucosal solution 10 mL (10 mL Swish & Swallow Given 9/26/19 2307)   aluminum-magnesium hydroxide-simethicone (MYLANTA) 200-200-20 mg/5 mL oral suspension 30 mL (30 mL Oral Given 9/26/19 2307)   pantoprazole (PROTONIX) EC tablet 40 mg (40 mg Oral Given 9/27/19 0011)       Diagnostic Studies  Results Reviewed     Procedure Component Value Units Date/Time    Troponin I [[id number]]  (Normal) Collected:  09/26/19 2305    Lab Status:  Final result Specimen:  Blood from Arm, Right Updated:  09/26/19 2344     Troponin I <0 02 ng/mL     Comprehensive metabolic panel [[id number]] Collected:  09/26/19 2305    Lab Status:  Final result Specimen:  Blood from Arm, Right Updated:  09/26/19 2342     Sodium 142 mmol/L      Potassium 3 6 mmol/L      Chloride 103 mmol/L      CO2 30 mmol/L      ANION GAP 9 mmol/L      BUN 17 mg/dL      Creatinine 1 27 mg/dL      Glucose 102 mg/dL      Calcium 9 2 mg/dL      AST 20 U/L      ALT 52 U/L      Alkaline Phosphatase 103 U/L      Total Protein 7 6 g/dL      Albumin 4 1 g/dL      Total Bilirubin 0 40 mg/dL      eGFR 70 ml/min/1 73sq m     Narrative:       Jane guidelines for Chronic Kidney Disease (CKD):     Stage 1 with normal or high GFR (GFR > 90 mL/min/1 73 square meters)    Stage 2 Mild CKD (GFR = 60-89 mL/min/1 73 square meters)    Stage 3A Moderate CKD (GFR = 45-59 mL/min/1 73 square meters)    Stage 3B Moderate CKD (GFR = 30-44 mL/min/1 73 square meters)    Stage 4 Severe CKD (GFR = 15-29 mL/min/1 73 square meters)    Stage 5 End Stage CKD (GFR <15 mL/min/1 73 square meters)  Note: GFR calculation is accurate only with a steady state creatinine    Lipase [[id number]]  (Normal) Collected:  09/26/19 2305    Lab Status:  Final result Specimen:  Blood from Arm, Right Updated:  09/26/19 2342     Lipase 240 u/L     CBC and differential [[id number]]  (Abnormal) Collected:  09/26/19 2305    Lab Status:  Final result Specimen:  Blood from Arm, Right Updated:  09/26/19 2325     WBC 15 10 Thousand/uL      RBC 6 06 Million/uL      Hemoglobin 16 7 g/dL      Hematocrit 51 2 %      MCV 85 fL      MCH 27 6 pg      MCHC 32 6 g/dL      RDW 13 7 %      MPV 10 4 fL      Platelets 927 Thousands/uL      nRBC 0 /100 WBCs      Neutrophils Relative 55 %      Immat GRANS % 1 %      Lymphocytes Relative 31 %      Monocytes Relative 10 %      Eosinophils Relative 2 %      Basophils Relative 1 %      Neutrophils Absolute 8 44 Thousands/µL      Immature Grans Absolute 0 08 Thousand/uL      Lymphocytes Absolute 4 74 Thousands/µL      Monocytes Absolute 1 45 Thousand/µL      Eosinophils Absolute 0 31 Thousand/µL      Basophils Absolute 0 08 Thousands/µL                  XR chest 2 views   ED Interpretation by Pari Horton MD (09/26 2326)   No acute pulmonary pathology                 Procedures  ECG 12 Lead Documentation Only  Date/Time: 9/26/2019 10:57 PM  Performed by: Pari Horton MD  Authorized by: Pari Horton MD     Indications / Diagnosis:  Chest pain  ECG reviewed by me, the ED Provider: yes    Patient location:  ED  Previous ECG:     Previous ECG:  Unavailable  Interpretation:     Interpretation: normal    Rate:     ECG rate:  61    ECG rate assessment: normal    Rhythm:     Rhythm: sinus rhythm    Ectopy:     Ectopy: none    QRS:     QRS axis:  Normal    QRS intervals:  Normal  Conduction:     Conduction: normal    ST segments:     ST segments:  Normal  T waves:     T waves: inverted      Inverted:  AVL           ED Course  ED Course as of Sep 27 0021   Fri Sep 27, 2019   0007 Patient informed of results   Improvement in symptoms after GI cocktail  He does not want to wait for repeat troponin and EKG at 3 hours and expresses understanding of risk  Strict return precautions provided  HEART Risk Score      Most Recent Value   History  0 Filed at: 09/27/2019 0009   ECG  0 Filed at: 09/27/2019 0009   Age  0 Filed at: 09/27/2019 0009   Risk Factors  1 Filed at: 09/27/2019 0009   Troponin  0 Filed at: 09/27/2019 0009   Heart Score Risk Calculator   History  0 Filed at: 09/27/2019 0009   ECG  0 Filed at: 09/27/2019 0009   Age  0 Filed at: 09/27/2019 0009   Risk Factors  1 Filed at: 09/27/2019 0009   Troponin  0 Filed at: 09/27/2019 0009   HEART Score  1 Filed at: 09/27/2019 0009   HEART Score  1 Filed at: 09/27/2019 0009                            MDM  Number of Diagnoses or Management Options  Atypical chest pain: new and requires workup  Elevated blood pressure reading: new and requires workup  GERD (gastroesophageal reflux disease): established and worsening  Diagnosis management comments: 35year old male presents with substernal burning chest pain unresponsive to Pepcid taken this evening  History of similar in the past  Not currently following with a PCP  Hypertensive on arrival; however, improved without intervention  Chest pain resolved with GI cocktail  HEART score 1  Patient declined repeat troponin/EKG at 3 hours  Protonix  Advised to follow with his assigned PCP or call [facility] for help finding a PCP  Discussed strict return precautions with patient         Amount and/or Complexity of Data Reviewed  Clinical lab tests: ordered and reviewed  Tests in the radiology section of CPT®: ordered  Independent visualization of images, tracings, or specimens: yes    Patient Progress  Patient progress: stable      Disposition  Final diagnoses:   Atypical chest pain   GERD (gastroesophageal reflux disease)   Elevated blood pressure reading     Time reflects when diagnosis was documented in both MDM as applicable and the Disposition within this note     Time User Action Codes Description Comment    9/27/2019 12:08 AM [place]a Mouse J Add [R07 89] Atypical chest pain     9/27/2019 12:08 AM Danny Varela Add [K21 9] GERD (gastroesophageal reflux disease)     9/27/2019 12:08 AM Primitivo Osgood Modify [R07 89] Atypical chest pain     9/27/2019 12:08 AM [place] Osgood Modify [K21 9] GERD (gastroesophageal reflux disease)     9/27/2019 12:10 AM Danny Varela Add [R03 0] Elevated blood pressure reading       ED Disposition     ED Disposition Condition Date/Time Comment    Discharge Stable Fri Sep 27, 2019 12:09 AM Rochele Pulse discharge to home/self care  Follow-up Information     Follow up With Specialties Details Why Contact Info Additional Information    Greta Noonan MD Internal Medicine Schedule an appointment as soon as possible for a visit in 3 days for re-evaluation Cedric Mackey[address]       [facility]  Call  if you need help finding a primary care provider [phone]       [address] [place] [address] Emergency Department Emergency Medicine Go to  If symptoms worsen [address] [place] [address] [address] ED, [address], [address]          Discharge Medication List as of 9/27/2019 12:12 AM      START taking these medications    Details   pantoprazole (PROTONIX) 20 mg tablet Take 1 tablet (20 mg total) by mouth daily, Starting Fri 9/27/2019, Print           No discharge procedures on file      ED Provider  Electronically Signed by           Avis Ross MD  09/27/19 0861

## 2019-10-25 ENCOUNTER — HOSPITAL ENCOUNTER (EMERGENCY)
Facility: HOSPITAL | Age: 39
Discharge: HOME | End: 2019-10-25
Attending: EMERGENCY MEDICINE | Admitting: EMERGENCY MEDICINE
Payer: COMMERCIAL

## 2019-10-25 VITALS
RESPIRATION RATE: 15 BRPM | TEMPERATURE: 97.8 F | HEART RATE: 70 BPM | SYSTOLIC BLOOD PRESSURE: 203 MMHG | DIASTOLIC BLOOD PRESSURE: 110 MMHG | OXYGEN SATURATION: 100 % | BODY MASS INDEX: 31.57 KG/M2 | WEIGHT: 220 LBS

## 2019-10-25 DIAGNOSIS — K08.89 PAIN, DENTAL: Primary | ICD-10-CM

## 2019-10-25 PROCEDURE — 64450 NJX AA&/STRD OTHER PN/BRANCH: CPT | Performed by: PHYSICIAN ASSISTANT

## 2019-10-25 PROCEDURE — 99282 EMERGENCY DEPT VISIT SF MDM: CPT

## 2019-10-25 PROCEDURE — 99284 EMERGENCY DEPT VISIT MOD MDM: CPT | Performed by: PHYSICIAN ASSISTANT

## 2019-10-25 RX ORDER — PENICILLIN V POTASSIUM 500 MG/1
500 TABLET ORAL EVERY 8 HOURS SCHEDULED
Qty: 21 TABLET | Refills: 0 | Status: SHIPPED | OUTPATIENT
Start: 2019-10-25 | End: 2019-11-01

## 2019-10-25 RX ORDER — BUPIVACAINE HYDROCHLORIDE 5 MG/ML
5 INJECTION, SOLUTION EPIDURAL; INTRACAUDAL ONCE
Status: COMPLETED | OUTPATIENT
Start: 2019-10-25 | End: 2019-10-25

## 2019-10-25 RX ADMIN — BUPIVACAINE HYDROCHLORIDE 5 ML: 5 INJECTION, SOLUTION EPIDURAL; INTRACAUDAL; PERINEURAL at 19:42

## 2019-10-25 NOTE — ED PROVIDER NOTES
History  Chief Complaint   Patient presents with    Dental Pain     Dental pain left upper tooth that he states broke; has dentist appt Monday to get it repaired  Also, pt is supposed to be on medicine for hypertension, but never started taking it  34 yo male w/ hx of GERD and HTN presents to the Emergency Department for evaluation of L upper tooth pain x 1 week, gradually worsening  Has appt for root canal in 3 days  No associated fevers, chills or sweats, no facial swelling or N/V  Took ibuprofen and acetaminophen without relief  None       Past Medical History:   Diagnosis Date    Chronic pain     GERD (gastroesophageal reflux disease)     Hypertension        Past Surgical History:   Procedure Laterality Date    NO PAST SURGERIES         History reviewed  No pertinent family history  I have reviewed and agree with the history as documented  Social History     Tobacco Use    Smoking status: Current Every Day Smoker     Packs/day: 1 00    Smokeless tobacco: Never Used   Substance Use Topics    Alcohol use: Yes     Comment: social    Drug use: Never        Review of Systems   Constitutional: Negative for chills, diaphoresis and fever  HENT: Positive for dental problem  Negative for sore throat  Eyes: Negative for visual disturbance  Respiratory: Negative for cough and shortness of breath  Cardiovascular: Negative for chest pain and palpitations  Gastrointestinal: Negative for abdominal pain, diarrhea, nausea and vomiting  Genitourinary: Negative for dysuria, flank pain and frequency  Musculoskeletal: Negative for arthralgias and myalgias  Skin: Negative for color change, rash and wound  Allergic/Immunologic: Negative for immunocompromised state  Neurological: Negative for dizziness and light-headedness  Hematological: Does not bruise/bleed easily  Psychiatric/Behavioral: Negative for confusion  The patient is not nervous/anxious          Physical Exam  Physical Exam   Constitutional: He is oriented to person, place, and time  He appears well-developed and well-nourished  No distress  HENT:   Head: Normocephalic and atraumatic  Mouth/Throat: Oropharynx is clear and moist    Numerous dental caries  No significant gingival swelling or abscess   Eyes: Pupils are equal, round, and reactive to light  No scleral icterus  Cardiovascular: Normal rate and regular rhythm  Exam reveals no gallop and no friction rub  No murmur heard  Pulmonary/Chest: No respiratory distress  He has no wheezes  He has no rales  Lymphadenopathy:     He has no cervical adenopathy  Neurological: He is alert and oriented to person, place, and time  Skin: Skin is warm and dry  Capillary refill takes less than 2 seconds  He is not diaphoretic  Psychiatric: He has a normal mood and affect  His behavior is normal    Vitals reviewed  Vital Signs  ED Triage Vitals   Temperature Pulse Respirations Blood Pressure SpO2   10/25/19 1936 10/25/19 1935 10/25/19 1935 10/25/19 1935 10/25/19 1935   97 8 °F (36 6 °C) 70 15 (!) 203/110 100 %      Temp Source Heart Rate Source Patient Position - Orthostatic VS BP Location FiO2 (%)   10/25/19 1936 10/25/19 1935 10/25/19 1935 10/25/19 1935 --   Tympanic Monitor Lying Left arm       Pain Score       10/25/19 1935       Worst Possible Pain           Vitals:    10/25/19 1935   BP: (!) 203/110   Pulse: 70   Patient Position - Orthostatic VS: Lying         Visual Acuity      ED Medications  Medications   bupivacaine (PF) (MARCAINE) 0 5 % injection 5 mL (5 mL Infiltration Given 10/25/19 1942)       Diagnostic Studies  Results Reviewed     None                 No orders to display              Procedures  Nerve block  Date/Time: 10/25/2019 7:43 PM  Performed by: Julián Hdez PA-C  Authorized by: Julián Hdez PA-C     Patient location:  ED  Consent:     Consent obtained:  Verbal    Consent given by:  Patient    Risks discussed:   Allergic reaction, nerve damage and unsuccessful block    Alternatives discussed:  No treatment  Universal protocol:     Procedure explained and questions answered to patient or proxy's satisfaction: yes      Relevant documents present and verified: yes      Test results available and properly labeled: yes     Radiology Images displayed and confirmed  If images not available, report reviewed: yes      Patient identity confirmed:  Verbally with patient  Indications:     Indications:  Pain relief  Location:     Body area:  Head    Head nerve blocked: R supraperiosteal     Nerve type:  Peripheral    Laterality:  Right  Skin anesthesia (see MAR for exact dosages):     Skin anesthesia method:  None  Procedure details (see MAR for exact dosages): Block needle gauge:  25 G    Anesthetic injected:  Bupivacaine 0 5% w/o epi    Steroid injected:  None    Additive injected:  None    Injection procedure:  Anatomic landmarks palpated, introduced needle, negative aspiration for blood and incremental injection  Post-procedure details:     Dressing:  None    Outcome:  Pain relieved    Patient tolerance of procedure: Tolerated well, no immediate complications           ED Course                               MDM  Number of Diagnoses or Management Options  Pain, dental: new and does not require workup  Diagnosis management comments: No e/o dental abscess  Pain resolved with dental block  Has outpatient dental f/u  BP markedly elevated, pt admittedly not taking his antihypertensives  Likely also elevated in the setting of acute pain   Recomend PCP f/u next week for BP recheck       Amount and/or Complexity of Data Reviewed  Review and summarize past medical records: yes        Disposition  Final diagnoses:   Pain, dental     Time reflects when diagnosis was documented in both MDM as applicable and the Disposition within this note     Time User Action Codes Description Comment    10/25/2019  7:52 PM Marivel Phillips Add [K08 89] Pain, dental       ED Disposition     ED Disposition Condition Date/Time Comment    Discharge Stable Fri Oct 25, 2019  7:52 PM Barrie Head discharge to home/self care  Follow-up Information     Follow up With Specialties Details Why Contact Info    Your dentist  In 3 days            Patient's Medications   Discharge Prescriptions    PENICILLIN V POTASSIUM (VEETID) 500 MG TABLET    Take 1 tablet (500 mg total) by mouth every 8 (eight) hours for 7 days       Start Date: 10/25/2019End Date: 11/1/2019       Order Dose: 500 mg       Quantity: 21 tablet    Refills: 0     No discharge procedures on file      ED Provider  Electronically Signed by           Michelle Olson PA-C  10/25/19 2003

## 2019-12-17 ENCOUNTER — APPOINTMENT (EMERGENCY)
Dept: RADIOLOGY | Facility: HOSPITAL | Age: 39
End: 2019-12-17
Payer: OTHER MISCELLANEOUS

## 2019-12-17 ENCOUNTER — HOSPITAL ENCOUNTER (EMERGENCY)
Facility: HOSPITAL | Age: 39
Discharge: HOME | End: 2019-12-17
Attending: EMERGENCY MEDICINE | Admitting: EMERGENCY MEDICINE
Payer: OTHER MISCELLANEOUS

## 2019-12-17 VITALS
TEMPERATURE: 97.4 F | OXYGEN SATURATION: 98 % | BODY MASS INDEX: 31.5 KG/M2 | DIASTOLIC BLOOD PRESSURE: 86 MMHG | HEART RATE: 78 BPM | WEIGHT: 220.02 LBS | RESPIRATION RATE: 16 BRPM | SYSTOLIC BLOOD PRESSURE: 166 MMHG | HEIGHT: 70 IN

## 2019-12-17 DIAGNOSIS — M25.512 ACUTE PAIN OF LEFT SHOULDER: Primary | ICD-10-CM

## 2019-12-17 PROCEDURE — 99284 EMERGENCY DEPT VISIT MOD MDM: CPT | Performed by: EMERGENCY MEDICINE

## 2019-12-17 PROCEDURE — 99283 EMERGENCY DEPT VISIT LOW MDM: CPT

## 2019-12-17 PROCEDURE — 73030 X-RAY EXAM OF SHOULDER: CPT

## 2019-12-17 RX ORDER — IBUPROFEN 800 MG/1
800 TABLET ORAL 3 TIMES DAILY
Qty: 21 TABLET | Refills: 0 | Status: SHIPPED | OUTPATIENT
Start: 2019-12-17 | End: 2020-07-24

## 2019-12-17 NOTE — ED PROVIDER NOTES
History  Chief Complaint   Patient presents with    Shoulder Injury     pt presents to ED c/o left shoulder pain  Pt had an injury to his shoulder about a month ago and has been seeing an orthopedic  Pt hurt it again at work today and wasn't able to get into ortho  Rates pain 11/5     50-year-old male presents for evaluation of left anterior shoulder pain  The patient states that he has a previous injury to his left shoulder that was work related and had followed up with workman's Comp in [employer]  He states that yesterday he was lifting something at work and had sudden onset of pain and he states that he feels like he re-injured his left shoulder and the same area where he had problems in the past   States that he attempted to contact his workman's Comp provider however he was advised to come to the ER  The patient denies any new numbness or tingling  He states that he does have some intermittent tingling into his left thumb and index finger  The patient denies any focal weakness  He denies any chest pain, pressure, shortness of breath  The patient states that his pain is worse with abduction and elevation of the left arm  The patient is left handed  None       Past Medical History:   Diagnosis Date    Chronic pain     GERD (gastroesophageal reflux disease)     Hypertension        Past Surgical History:   Procedure Laterality Date    NO PAST SURGERIES         History reviewed  No pertinent family history  I have reviewed and agree with the history as documented  Social History     Tobacco Use    Smoking status: Current Every Day Smoker     Packs/day: 1 00    Smokeless tobacco: Never Used   Substance Use Topics    Alcohol use: Yes     Comment: social    Drug use: Never        Review of Systems   Musculoskeletal: Positive for arthralgias  Neurological: Positive for numbness  Negative for weakness  All other systems reviewed and are negative        Physical Exam  Physical Exam Constitutional: He appears well-developed and well-nourished  No distress  HENT:   Head: Normocephalic and atraumatic  Right Ear: External ear normal    Left Ear: External ear normal    Eyes: No scleral icterus  Neck: Normal range of motion  Pulmonary/Chest: Effort normal  No respiratory distress  Musculoskeletal:        Left shoulder: He exhibits decreased range of motion ( Decreased elevation (limited 90°) and external rotation), tenderness, pain ( Anteriorly in the region of the glenoid labrum) and decreased strength ( Limited by pain)  He exhibits no bony tenderness, no effusion, no crepitus, no deformity and normal pulse  Neurological: He is alert  Skin: No rash noted  Psychiatric: He has a normal mood and affect  Nursing note and vitals reviewed  Vital Signs  ED Triage Vitals [12/17/19 1706]   Temperature Pulse Respirations Blood Pressure SpO2   (!) 97 4 °F (36 3 °C) 78 16 166/86 98 %      Temp Source Heart Rate Source Patient Position - Orthostatic VS BP Location FiO2 (%)   Temporal Monitor Sitting Right arm --      Pain Score       6           Vitals:    12/17/19 1706   BP: 166/86   Pulse: 78   Patient Position - Orthostatic VS: Sitting         Visual Acuity      ED Medications  Medications - No data to display    Diagnostic Studies  Results Reviewed     None                 XR shoulder 2+ vw left   ED Interpretation by Nancy Patel DO (12/17 1804)   No acute fracture, dislocation or separation noted      Final Result by Subha Carlton DO (12/17 1807)      No acute osseous abnormality  Workstation performed: [id number]                    Procedures  Procedures         ED Course                               MDM  Number of Diagnoses or Management Options  Acute pain of left shoulder:   Diagnosis management comments: X-ray reviewed without clinically significant osseous injury    I discussed the plan for anti-inflammatories and outpatient Occupational Medicine follow-up      The patient (and any family present) verbalized understanding of the discharge instructions and warnings that would necessitate return to the Emergency Department  All questions were answered prior to discharge  Amount and/or Complexity of Data Reviewed  Tests in the radiology section of CPT®: reviewed and ordered  Review and summarize past medical records: yes  Independent visualization of images, tracings, or specimens: yes          Disposition  Final diagnoses:   Acute pain of left shoulder     Time reflects when diagnosis was documented in both MDM as applicable and the Disposition within this note     Time User Action Codes Description Comment    12/17/2019  6:05 PM Lelo Castellon Add [M25 512] Acute pain of left shoulder       ED Disposition     ED Disposition Condition Date/Time Comment    Discharge Stable Tue Dec 17, 2019  6:05 PM Charley Minaya discharge to home/self care  Follow-up Information     Follow up With Specialties Details Why [address] [facility]  Schedule an appointment as soon as possible for a visit  For further evaluation if unable to follow up with your employer's occupational medicine provider [address]  [facility] [facility] [address]  [phone]          Patient's Medications   Discharge Prescriptions    IBUPROFEN (MOTRIN) 800 MG TABLET    Take 1 tablet (800 mg total) by mouth 3 (three) times a day       Start Date: 12/17/2019End Date: --       Order Dose: 800 mg       Quantity: 21 tablet    Refills: 0     No discharge procedures on file      ED Provider  Electronically Signed by           Cecily Acosta DO  12/17/19 4263

## 2020-07-24 ENCOUNTER — HOSPITAL ENCOUNTER (EMERGENCY)
Facility: HOSPITAL | Age: 40
Discharge: HOME | End: 2020-07-24
Attending: EMERGENCY MEDICINE | Admitting: DENTIST
Payer: COMMERCIAL

## 2020-07-24 ENCOUNTER — APPOINTMENT (EMERGENCY)
Dept: CT IMAGING | Facility: HOSPITAL | Age: 40
End: 2020-07-24
Payer: COMMERCIAL

## 2020-07-24 VITALS
RESPIRATION RATE: 18 BRPM | BODY MASS INDEX: 32 KG/M2 | DIASTOLIC BLOOD PRESSURE: 112 MMHG | WEIGHT: 223.5 LBS | OXYGEN SATURATION: 100 % | HEIGHT: 70 IN | HEART RATE: 96 BPM | TEMPERATURE: 97.6 F | SYSTOLIC BLOOD PRESSURE: 189 MMHG

## 2020-07-24 DIAGNOSIS — I10 HYPERTENSION: ICD-10-CM

## 2020-07-24 DIAGNOSIS — K52.9 ENTEROCOLITIS: ICD-10-CM

## 2020-07-24 DIAGNOSIS — R10.9 ABDOMINAL PAIN: Primary | ICD-10-CM

## 2020-07-24 DIAGNOSIS — Q61.3 POLYCYSTIC KIDNEY: ICD-10-CM

## 2020-07-24 DIAGNOSIS — D72.829 LEUKOCYTOSIS: ICD-10-CM

## 2020-07-24 DIAGNOSIS — K76.89 HEPATIC CYST: ICD-10-CM

## 2020-07-24 LAB
ALBUMIN SERPL BCP-MCNC: 4.1 G/DL (ref 3.5–5)
ALP SERPL-CCNC: 95 U/L (ref 46–116)
ALT SERPL W P-5'-P-CCNC: 63 U/L (ref 12–78)
ANION GAP SERPL CALCULATED.3IONS-SCNC: 7 MMOL/L (ref 4–13)
AST SERPL W P-5'-P-CCNC: 26 U/L (ref 5–45)
BASOPHILS # BLD AUTO: 0.09 THOUSANDS/ΜL (ref 0–0.1)
BASOPHILS NFR BLD AUTO: 1 % (ref 0–1)
BILIRUB SERPL-MCNC: 0.5 MG/DL (ref 0.2–1)
BILIRUB UR QL STRIP: NEGATIVE
BUN SERPL-MCNC: 12 MG/DL (ref 5–25)
CALCIUM SERPL-MCNC: 9.1 MG/DL (ref 8.3–10.1)
CHLORIDE SERPL-SCNC: 105 MMOL/L (ref 100–108)
CLARITY UR: CLEAR
CLARITY, POC: CLEAR
CO2 SERPL-SCNC: 29 MMOL/L (ref 21–32)
COLOR UR: YELLOW
COLOR, POC: YELLOW
CREAT SERPL-MCNC: 1.39 MG/DL (ref 0.6–1.3)
EOSINOPHIL # BLD AUTO: 0.27 THOUSAND/ΜL (ref 0–0.61)
EOSINOPHIL NFR BLD AUTO: 2 % (ref 0–6)
ERYTHROCYTE [DISTWIDTH] IN BLOOD BY AUTOMATED COUNT: 13.5 % (ref 11.6–15.1)
EXT BILIRUBIN, UA: NEGATIVE
EXT BLOOD URINE: ABNORMAL
EXT GLUCOSE, UA: NEGATIVE
EXT KETONES: NEGATIVE
EXT NITRITE, UA: NEGATIVE
EXT PH, UA: 6
EXT PROTEIN, UA: ABNORMAL
EXT SPECIFIC GRAVITY, UA: 1.01
EXT UROBILINOGEN: 0.2
GFR SERPL CREATININE-BSD FRML MDRD: 63 ML/MIN/1.73SQ M
GLUCOSE SERPL-MCNC: 98 MG/DL (ref 65–140)
GLUCOSE UR STRIP-MCNC: NEGATIVE MG/DL
HCT VFR BLD AUTO: 53.6 % (ref 36.5–49.3)
HGB BLD-MCNC: 17.7 G/DL (ref 12–17)
HGB UR QL STRIP.AUTO: NEGATIVE
IMM GRANULOCYTES # BLD AUTO: 0.05 THOUSAND/UL (ref 0–0.2)
IMM GRANULOCYTES NFR BLD AUTO: 0 % (ref 0–2)
KETONES UR STRIP-MCNC: NEGATIVE MG/DL
LEUKOCYTE ESTERASE UR QL STRIP: NEGATIVE
LIPASE SERPL-CCNC: 478 U/L (ref 73–393)
LYMPHOCYTES # BLD AUTO: 3.22 THOUSANDS/ΜL (ref 0.6–4.47)
LYMPHOCYTES NFR BLD AUTO: 22 % (ref 14–44)
MCH RBC QN AUTO: 28 PG (ref 26.8–34.3)
MCHC RBC AUTO-ENTMCNC: 33 G/DL (ref 31.4–37.4)
MCV RBC AUTO: 85 FL (ref 82–98)
MONOCYTES # BLD AUTO: 1.25 THOUSAND/ΜL (ref 0.17–1.22)
MONOCYTES NFR BLD AUTO: 9 % (ref 4–12)
NEUTROPHILS # BLD AUTO: 9.59 THOUSANDS/ΜL (ref 1.85–7.62)
NEUTS SEG NFR BLD AUTO: 66 % (ref 43–75)
NITRITE UR QL STRIP: NEGATIVE
NRBC BLD AUTO-RTO: 0 /100 WBCS
PH UR STRIP.AUTO: 5.5 [PH]
PLATELET # BLD AUTO: 265 THOUSANDS/UL (ref 149–390)
PMV BLD AUTO: 10.2 FL (ref 8.9–12.7)
POTASSIUM SERPL-SCNC: 4 MMOL/L (ref 3.5–5.3)
PROT SERPL-MCNC: 7.8 G/DL (ref 6.4–8.2)
PROT UR STRIP-MCNC: NEGATIVE MG/DL
RBC # BLD AUTO: 6.32 MILLION/UL (ref 3.88–5.62)
SODIUM SERPL-SCNC: 141 MMOL/L (ref 136–145)
SP GR UR STRIP.AUTO: 1.02 (ref 1–1.03)
UROBILINOGEN UR QL STRIP.AUTO: 0.2 E.U./DL
WBC # BLD AUTO: 14.47 THOUSAND/UL (ref 4.31–10.16)
WBC # BLD EST: ABNORMAL 10*3/UL

## 2020-07-24 PROCEDURE — 83690 ASSAY OF LIPASE: CPT | Performed by: PHYSICIAN ASSISTANT

## 2020-07-24 PROCEDURE — 81003 URINALYSIS AUTO W/O SCOPE: CPT | Performed by: PHYSICIAN ASSISTANT

## 2020-07-24 PROCEDURE — 36415 COLL VENOUS BLD VENIPUNCTURE: CPT | Performed by: PHYSICIAN ASSISTANT

## 2020-07-24 PROCEDURE — 80053 COMPREHEN METABOLIC PANEL: CPT | Performed by: PHYSICIAN ASSISTANT

## 2020-07-24 PROCEDURE — 74177 CT ABD & PELVIS W/CONTRAST: CPT

## 2020-07-24 PROCEDURE — 99284 EMERGENCY DEPT VISIT MOD MDM: CPT | Performed by: PHYSICIAN ASSISTANT

## 2020-07-24 PROCEDURE — 99284 EMERGENCY DEPT VISIT MOD MDM: CPT

## 2020-07-24 PROCEDURE — 85025 COMPLETE CBC W/AUTO DIFF WBC: CPT | Performed by: PHYSICIAN ASSISTANT

## 2020-07-24 RX ADMIN — IOHEXOL 100 ML: 350 INJECTION, SOLUTION INTRAVENOUS at 18:56

## 2020-07-24 NOTE — ED PROVIDER NOTES
History  Chief Complaint   Patient presents with    Abdominal Pain     patient reports left lower abdominal pain that feels like a "pulled muscle" x4 days  radiates to left testicle and up to ribs  stillness relieves and movement exacerbates pain  denies n/v/d, fever  last BM today and "normal " no urinary symptoms  no new sexual partners     Patient is a 39 y/o M with h/o HTN, GERD that presents to the ED with LLQ abdominal pain that started 4 days ago  He states he started with pain to left lower rib 4 days ago, but now the pain is constant in LLQ  Movement makes the pain worse  He did not take anything for the pain  No fevers, chills, nausea, vomiting, or diarrhea  No blood in stool  History provided by:  Patient  Abdominal Pain   Pain location:  LLQ  Pain quality: aching    Pain radiates to:  Does not radiate  Pain severity:  Moderate  Onset quality:  Gradual  Duration:  4 days  Timing:  Constant  Progression:  Unchanged  Chronicity:  New  Context: not sick contacts, not suspicious food intake and not trauma    Relieved by:  Nothing  Worsened by:  Nothing  Ineffective treatments:  None tried  Associated symptoms: no chest pain, no chills, no constipation, no cough, no diarrhea, no dysuria, no fever, no nausea, no shortness of breath and no vomiting    Risk factors: no aspirin use, not elderly, has not had multiple surgeries and no recent hospitalization        None       Past Medical History:   Diagnosis Date    Chronic pain     GERD (gastroesophageal reflux disease)     Hypertension        Past Surgical History:   Procedure Laterality Date    NO PAST SURGERIES         History reviewed  No pertinent family history  I have reviewed and agree with the history as documented      E-Cigarette/Vaping    E-Cigarette Use Never User      E-Cigarette/Vaping Substances     Social History     Tobacco Use    Smoking status: Current Every Day Smoker     Packs/day: 1 00    Smokeless tobacco: Never Used Substance Use Topics    Alcohol use: Yes     Comment: social    Drug use: Never       Review of Systems   Constitutional: Negative for chills and fever  HENT: Negative  Respiratory: Negative for cough and shortness of breath  Cardiovascular: Negative for chest pain and leg swelling  Gastrointestinal: Positive for abdominal pain  Negative for blood in stool, constipation, diarrhea, nausea and vomiting  Genitourinary: Negative for dysuria  Musculoskeletal: Negative for back pain and neck pain  Skin: Negative for color change and rash  Neurological: Negative for dizziness, weakness and numbness  Psychiatric/Behavioral: Negative for confusion  All other systems reviewed and are negative  Physical Exam  Physical Exam   Constitutional: He is oriented to person, place, and time  He appears well-developed and well-nourished  He is cooperative  He does not appear ill  No distress  HENT:   Head: Normocephalic and atraumatic  Nose: Nose normal    Mouth/Throat: Oropharynx is clear and moist    Eyes: Conjunctivae are normal    Neck: Normal range of motion  Cardiovascular: Normal rate, regular rhythm and normal heart sounds  No murmur heard  Pulmonary/Chest: Effort normal and breath sounds normal  He has no wheezes  He has no rhonchi  He has no rales  Abdominal: Soft  Normal appearance and bowel sounds are normal  There is tenderness in the left lower quadrant  There is guarding  There is no rigidity and no rebound  Musculoskeletal: Normal range of motion  He exhibits no edema  Neurological: He is alert and oriented to person, place, and time  He has normal strength  No sensory deficit  Gait normal    Skin: Skin is warm and dry  No rash noted  He is not diaphoretic  No pallor  Nursing note and vitals reviewed        Vital Signs  ED Triage Vitals   Temperature Pulse Respirations Blood Pressure SpO2   07/24/20 1611 07/24/20 1613 07/24/20 1613 07/24/20 1613 07/24/20 1613   97 6 °F (36 4 °C) 95 18 (!) 191/119 99 %      Temp src Heart Rate Source Patient Position - Orthostatic VS BP Location FiO2 (%)   -- 07/24/20 1613 07/24/20 1902 07/24/20 1902 --    Monitor Sitting Left arm       Pain Score       07/24/20 1613       6           Vitals:    07/24/20 1613 07/24/20 1902   BP: (!) 191/119 (!) 189/112   Pulse: 95 96   Patient Position - Orthostatic VS:  Sitting         Visual Acuity      ED Medications  Medications   nicotine polacrilex (NICORETTE) gum 2 mg (2 mg Oral Not Given 7/24/20 1924)   iohexol (OMNIPAQUE) 350 MG/ML injection (MULTI-DOSE) 100 mL (100 mL Intravenous Given 7/24/20 1856)       Diagnostic Studies  Results Reviewed     Procedure Component Value Units Date/Time    UA w Reflex to Microscopic w Reflex to Culture [[id number]] Collected:  07/24/20 1716    Lab Status:  Final result Specimen:  Urine, Clean Catch Updated:  07/24/20 1739     Color, UA Yellow     Clarity, UA Clear     Specific [place], UA 1 020     pH, UA 5 5     Leukocytes, UA Negative     Nitrite, UA Negative     Protein, UA Negative mg/dl      Glucose, UA Negative mg/dl      Ketones, UA Negative mg/dl      Urobilinogen, UA 0 2 E U /dl      Bilirubin, UA Negative     Blood, UA Negative    Lipase [[id number]]  (Abnormal) Collected:  07/24/20 1637    Lab Status:  Final result Specimen:  Blood from Arm, Left Updated:  07/24/20 1710     Lipase 478 u/L     Comprehensive metabolic panel [[id number]]  (Abnormal) Collected:  07/24/20 1637    Lab Status:  Final result Specimen:  Blood from Arm, Left Updated:  07/24/20 1710     Sodium 141 mmol/L      Potassium 4 0 mmol/L      Chloride 105 mmol/L      CO2 29 mmol/L      ANION GAP 7 mmol/L      BUN 12 mg/dL      Creatinine 1 39 mg/dL      Glucose 98 mg/dL      Calcium 9 1 mg/dL      AST 26 U/L      ALT 63 U/L      Alkaline Phosphatase 95 U/L      Total Protein 7 8 g/dL      Albumin 4 1 g/dL      Total Bilirubin 0 50 mg/dL      eGFR 63 ml/min/1 73sq m     Narrative:       National Kidney Disease Foundation guidelines for Chronic Kidney Disease (CKD):     Stage 1 with normal or high GFR (GFR > 90 mL/min/1 73 square meters)    Stage 2 Mild CKD (GFR = 60-89 mL/min/1 73 square meters)    Stage 3A Moderate CKD (GFR = 45-59 mL/min/1 73 square meters)    Stage 3B Moderate CKD (GFR = 30-44 mL/min/1 73 square meters)    Stage 4 Severe CKD (GFR = 15-29 mL/min/1 73 square meters)    Stage 5 End Stage CKD (GFR <15 mL/min/1 73 square meters)  Note: GFR calculation is accurate only with a steady state creatinine    CBC and differential [[id number]]  (Abnormal) Collected:  07/24/20 1637    Lab Status:  Final result Specimen:  Blood from Arm, Left Updated:  07/24/20 1643     WBC 14 47 Thousand/uL      RBC 6 32 Million/uL      Hemoglobin 17 7 g/dL      Hematocrit 53 6 %      MCV 85 fL      MCH 28 0 pg      MCHC 33 0 g/dL      RDW 13 5 %      MPV 10 2 fL      Platelets 316 Thousands/uL      nRBC 0 /100 WBCs      Neutrophils Relative 66 %      Immat GRANS % 0 %      Lymphocytes Relative 22 %      Monocytes Relative 9 %      Eosinophils Relative 2 %      Basophils Relative 1 %      Neutrophils Absolute 9 59 Thousands/µL      Immature Grans Absolute 0 05 Thousand/uL      Lymphocytes Absolute 3 22 Thousands/µL      Monocytes Absolute 1 25 Thousand/µL      Eosinophils Absolute 0 27 Thousand/µL      Basophils Absolute 0 09 Thousands/µL     POCT urinalysis dipstick [[id number]]  (Abnormal) Resulted:  07/24/20 1642    Lab Status:  Final result Specimen:  Urine Updated:  07/24/20 1643     Color, UA yellow     Clarity, UA clear     Glucose, UA (Ref: Negative) negative     Bilirubin, UA (Ref: Negative) negative     Ketones, UA (Ref: Negative) negative     Spec Grav, UA (Ref:1 003-1 030) 1 015     Blood, UA (Ref: Negative) trace     pH, UA (Ref: 4 5-8 0) 6 0     Protein, UA (Ref: Negative) trace     Urobilinogen, UA (Ref: 0 2- 1 0) 0 2      Leukocytes, UA (Ref: Negative) trace     Nitrite, UA (Ref: Negative) negative CT abdomen pelvis with contrast   Final Result by Edwar Dhaliwal MD (07/24 1914)   1  Mild mucosal thickening in left and central small bowel loops  Collapsed left colon and sigmoid colon with equivocal mild mucosal thickening also noted  Correlate clinically to exclude a mild enterocolitis  Otherwise no acute intra-abdominal    pathology  2   Polycystic kidneys  Numerous hepatic cysts also noted  Workstation performed: [id number]                    Procedures  Procedures         ED Course       US AUDIT      Most Recent Value   Initial Alcohol Screen: US AUDIT-C    1  How often do you have a drink containing alcohol? 2 Filed at: 07/24/2020 1614   2  How many drinks containing alcohol do you have on a typical day you are drinking? 0 Filed at: 07/24/2020 1614   3a  Male UNDER 65: How often do you have five or more drinks on one occasion? 0 Filed at: 07/24/2020 1614   3b  FEMALE Any Age, or MALE 65+: How often do you have 4 or more drinks on one occassion? 0 Filed at: 07/24/2020 1614   Audit-C Score  2 Filed at: 07/24/2020 1614                  DIONICIO/DAST-10      Most Recent Value   How many times in the past year have you    Used an illegal drug or used a prescription medication for non-medical reasons? Never Filed at: 07/24/2020 1614                                MDM  Number of Diagnoses or Management Options  Abdominal pain: new and requires workup  Enterocolitis: new and requires workup  Hepatic cyst: new and does not require workup  Hypertension: established and worsening  Leukocytosis: established and worsening  Polycystic kidney: minor  Diagnosis management comments: Patient with LLQ abdominal pain, will order CT scan and labs to r/o diverticulitis, colitis  Patient with enterocolitis, most likely viral, will hold off on abx  Advised bland diet    Patient with leukocytosis for about 1 year, advised f/u with PCP for recheck and further testing if it remains elevated  Patient aware of polycystic kidneys  ADvised f/u with PCP concerning cyst in liver and HTN  Amount and/or Complexity of Data Reviewed  Clinical lab tests: ordered and reviewed  Tests in the radiology section of CPT®: ordered and reviewed    Patient Progress  Patient progress: stable        Disposition  Final diagnoses:   Abdominal pain   Enterocolitis   Polycystic kidney   Hepatic cyst   Leukocytosis   Hypertension     Time reflects when diagnosis was documented in both MDM as applicable and the Disposition within this note     Time User Action Codes Description Comment    7/24/2020  7:20 PM Ira Boeck Add [R10 9] Abdominal pain     7/24/2020  7:20 PM Ira Boeck Add [K52 9] Enterocolitis     7/24/2020  7:21 PM Ira Boeck Add [Q61 3] Polycystic kidney     7/24/2020  7:21 PM Ira Boeck Add [K76 89] Hepatic cyst     7/24/2020  7:36 PM Chancy Grade [Q22 045] Leukocytosis     7/24/2020  7:38 PM [address] Hypertension       ED Disposition     ED Disposition Condition Date/Time Comment    Discharge Stable Fri Jul 24, 2020  7:36 PM Skye Tripathi discharge to home/self care  Follow-up Information     Follow up With Specialties Details Why Contact Info    Clementine Lopez MD Internal Medicine Call in 3 days For recheck of WBC, liver cyst and enterocolitis  [address] [facility]  [address]  [phone]            There are no discharge medications for this patient  No discharge procedures on file      PDMP Review       Value Time User    PDMP Reviewed  Yes 10/25/2019  7:38 PM Raffaele Bob PA-C          ED Provider  Electronically Signed by           Janki Atkins PA-C  07/24/20 1954

## 2020-07-24 NOTE — ED NOTES
Pt asking to go outside and smoke, educated pt this was not an option but we could order him patch/gum  Refused both        [facility]  07/24/20 7729

## 2020-07-24 NOTE — DISCHARGE INSTRUCTIONS
Rest, increase fluids  [place] diet  Follow up with family doctor for recheck of blood pressure and CBC

## 2020-07-30 ENCOUNTER — HOSPITAL ENCOUNTER (EMERGENCY)
Facility: HOSPITAL | Age: 40
Discharge: HOME | End: 2020-07-30
Attending: EMERGENCY MEDICINE
Payer: COMMERCIAL

## 2020-07-30 ENCOUNTER — APPOINTMENT (EMERGENCY)
Dept: RADIOLOGY | Facility: HOSPITAL | Age: 40
End: 2020-07-30
Payer: COMMERCIAL

## 2020-07-30 VITALS
BODY MASS INDEX: 32 KG/M2 | HEART RATE: 77 BPM | RESPIRATION RATE: 20 BRPM | TEMPERATURE: 97.3 F | OXYGEN SATURATION: 98 % | WEIGHT: 223 LBS | DIASTOLIC BLOOD PRESSURE: 110 MMHG | SYSTOLIC BLOOD PRESSURE: 168 MMHG

## 2020-07-30 DIAGNOSIS — R07.81 RIB PAIN ON LEFT SIDE: Primary | ICD-10-CM

## 2020-07-30 LAB
CLARITY, POC: CLEAR
COLOR, POC: YELLOW
EXT BILIRUBIN, UA: NEGATIVE
EXT BLOOD URINE: NEGATIVE
EXT GLUCOSE, UA: NEGATIVE
EXT KETONES: NEGATIVE
EXT NITRITE, UA: NEGATIVE
EXT PH, UA: 5
EXT PROTEIN, UA: NEGATIVE
EXT SPECIFIC GRAVITY, UA: 1.01
EXT UROBILINOGEN: 0.2
WBC # BLD EST: NEGATIVE 10*3/UL

## 2020-07-30 PROCEDURE — 71101 X-RAY EXAM UNILAT RIBS/CHEST: CPT

## 2020-07-30 PROCEDURE — 99285 EMERGENCY DEPT VISIT HI MDM: CPT | Performed by: PHYSICIAN ASSISTANT

## 2020-07-30 PROCEDURE — 81002 URINALYSIS NONAUTO W/O SCOPE: CPT | Performed by: PHYSICIAN ASSISTANT

## 2020-07-30 PROCEDURE — 99283 EMERGENCY DEPT VISIT LOW MDM: CPT

## 2020-07-30 RX ORDER — METHOCARBAMOL 500 MG/1
500 TABLET, FILM COATED ORAL 3 TIMES DAILY
Qty: 20 TABLET | Refills: 0 | Status: SHIPPED | OUTPATIENT
Start: 2020-07-30 | End: 2020-09-01

## 2020-07-30 NOTE — DISCHARGE INSTRUCTIONS
Ice for next 2 days, heat after 2 days  Take motrin 600mg every 6 hours as needed for pain, robaxin 3 times a day  Follow up with family doctor if no improvement in 5-7 days

## 2020-07-30 NOTE — ED PROVIDER NOTES
History  Chief Complaint   Patient presents with    Rib Pain     To ED for continued left rib pain  Patient was seen in ED for same and associated abd pain 4 days ago  Denies any injury  Patient is a 37 y/o M with h/o GERD, HTN that presents to the ED with left lower rib pain x 4 days  Patient was seen in the ER 4 days ago for LLQ abdominal pain, which resolved  He states he had left rib pain, but it seems to be getting worse  He denies cough or SOB  Sitting on the couch makes the pain worse  He applied a lidocaine patch to his ribs, but it didn't help  He denies trauma to his ribs  No urinary symptoms, fevers, nausea, vomiting or diarrhea  History provided by:  Patient  Chest Pain   Pain location:  L lateral chest  Pain quality: aching    Pain radiates to:  Does not radiate  Pain severity:  Moderate  Onset quality:  Gradual  Duration:  4 days  Timing:  Constant  Progression:  Worsening  Chronicity:  New  Context: no stress and no trauma    Relieved by:  Certain positions  Exacerbated by: sitting  Ineffective treatments: lidocaine patch  Associated symptoms: no abdominal pain, no anxiety, no back pain, no cough, no dizziness, no fever, no nausea, no numbness, no shortness of breath, not vomiting and no weakness    Risk factors: hypertension, male sex and smoking    Risk factors: no aortic disease, no coronary artery disease, no diabetes mellitus, no prior DVT/PE and no surgery        None       Past Medical History:   Diagnosis Date    Chronic pain     GERD (gastroesophageal reflux disease)     Hypertension        Past Surgical History:   Procedure Laterality Date    NO PAST SURGERIES         History reviewed  No pertinent family history  I have reviewed and agree with the history as documented      E-Cigarette/Vaping    E-Cigarette Use Never User      E-Cigarette/Vaping Substances    Nicotine No     Flavoring No      Social History     Tobacco Use    Smoking status: Current Every Day Smoker Packs/day: 1 00    Smokeless tobacco: Never Used   Substance Use Topics    Alcohol use: Yes     Comment: social    Drug use: Never       Review of Systems   Constitutional: Negative for chills and fever  Eyes: Negative for visual disturbance  Respiratory: Negative for cough and shortness of breath  Cardiovascular: Negative for chest pain and leg swelling  Gastrointestinal: Negative for abdominal pain, nausea and vomiting  Genitourinary: Negative for dysuria  Musculoskeletal: Negative for back pain  Skin: Negative for color change and rash  Neurological: Negative for dizziness, weakness and numbness  Psychiatric/Behavioral: Negative for confusion  All other systems reviewed and are negative  Physical Exam  Physical Exam   Constitutional: He appears well-developed and well-nourished  He is cooperative  He does not appear ill  No distress  HENT:   Head: Normocephalic and atraumatic  Nose: Nose normal    Mouth/Throat: Oropharynx is clear and moist    Eyes: Conjunctivae are normal    Neck: Normal range of motion  Cardiovascular: Normal rate, regular rhythm and normal heart sounds  Pulmonary/Chest: Effort normal and breath sounds normal  He has no wheezes  He has no rhonchi  He has no rales  He exhibits tenderness (left lateral ribs  )  He exhibits no laceration, no deformity, no swelling and no retraction  Abdominal: Soft  Normal appearance and bowel sounds are normal  There is no tenderness  Musculoskeletal: Normal range of motion  He exhibits no edema  Neurological: He is alert  He has normal strength  He is not disoriented  No sensory deficit  Skin: Skin is warm and dry  No bruising, no ecchymosis and no rash noted  He is not diaphoretic  No erythema  No pallor  Psychiatric: He has a normal mood and affect  His speech is normal  Cognition and memory are normal    Nursing note and vitals reviewed        Vital Signs  ED Triage Vitals [07/30/20 1625]   Temperature Pulse Respirations Blood Pressure SpO2   (!) 97 3 °F (36 3 °C) 77 20 (!) 168/110 98 %      Temp Source Heart Rate Source Patient Position - Orthostatic VS BP Location FiO2 (%)   Tympanic Monitor Sitting Right arm --      Pain Score       8           Vitals:    07/30/20 1625   BP: (!) 168/110   Pulse: 77   Patient Position - Orthostatic VS: Sitting         Visual Acuity      ED Medications  Medications - No data to display    Diagnostic Studies  Results Reviewed     Procedure Component Value Units Date/Time    POCT urinalysis dipstick [[id number]]  (Normal) Resulted:  07/30/20 1657    Lab Status:  Final result Specimen:  Urine Updated:  07/30/20 1658     Color, UA yellow     Clarity, UA clear     Glucose, UA (Ref: Negative) negative     Bilirubin, UA (Ref: Negative) negative     Ketones, UA (Ref: Negative) negative     Spec Grav, UA (Ref:1 003-1 030) 1 015     Blood, UA (Ref: Negative) negative     pH, UA (Ref: 4 5-8 0) 5     Protein, UA (Ref: Negative) negative     Urobilinogen, UA (Ref: 0 2- 1 0) 0 2      Leukocytes, UA (Ref: Negative) negative     Nitrite, UA (Ref: Negative) negative                 XR ribs with pa chest min 3 views LEFT   ED Interpretation by Kevin Castro PA-C (07/30 1702)   No acute abnormalities  Procedures  Procedures         ED Course       US AUDIT      Most Recent Value   Initial Alcohol Screen: US AUDIT-C    1  How often do you have a drink containing alcohol?  0 Filed at: 07/30/2020 1626   2  How many drinks containing alcohol do you have on a typical day you are drinking? 0 Filed at: 07/30/2020 1626   3a  Male UNDER 65: How often do you have five or more drinks on one occasion? 0 Filed at: 07/30/2020 1626   3b  FEMALE Any Age, or MALE 65+: How often do you have 4 or more drinks on one occassion? 0 Filed at: 07/30/2020 1626   Audit-C Score  0 Filed at: 07/30/2020 1626                  DIONICIO/DAST-10      Most Recent Value   How many times in the past year have you    Used an illegal drug or used a prescription medication for non-medical reasons? Never Filed at: 07/30/2020 1626                                [facility]  Number of Diagnoses or Management Options  Rib pain on left side: new and requires workup  Diagnosis management comments: Patient with left lower rib pain, will order xray to r/o fracture, urine to r/o blood in urine or UTI  Pain reproducible to palpation, most likely muscular, advised f/u with PCP if no improvement with muscle relaxant  No rash, doubt shingles since pain has been present for over 4 days  Amount and/or Complexity of Data Reviewed  Tests in the radiology section of CPT®: ordered and reviewed  Independent visualization of images, tracings, or specimens: yes    Patient Progress  Patient progress: stable        Disposition  Final diagnoses:   Rib pain on left side     Time reflects when diagnosis was documented in both MDM as applicable and the Disposition within this note     Time User Action Codes Description Comment    7/30/2020  5:06 PM Katarzynajh Pamella Add [R07 81] Rib pain on left side       ED Disposition     ED Disposition Condition Date/Time Comment    Discharge Stable Thu Jul 30, 2020  5:06 PM Shayy Tanner discharge to home/self care  Follow-up Information     Follow up With Specialties Details Why Contact Info    Casey Bunn MD Internal Medicine Call in 1 week For recheck [address]  [phone]            Patient's Medications   Discharge Prescriptions    METHOCARBAMOL (ROBAXIN) 500 MG TABLET    Take 1 tablet (500 mg total) by mouth 3 (three) times a day       Start Date: 7/30/2020 End Date: --       Order Dose: 500 mg       Quantity: 20 tablet    Refills: 0     No discharge procedures on file      PDMP Review       Value Time User    PDMP Reviewed  Yes 10/25/2019  7:38 PM Alejandro Amaya PA-C          ED Provider  Electronically Signed by           Sebastian Hauser PA-C  07/30/20 Vaughn Alfonso

## 2020-09-01 ENCOUNTER — HOSPITAL ENCOUNTER (EMERGENCY)
Facility: HOSPITAL | Age: 40
Discharge: HOME | DRG: 199 | End: 2020-09-01
Attending: EMERGENCY MEDICINE
Payer: COMMERCIAL

## 2020-09-01 ENCOUNTER — APPOINTMENT (EMERGENCY)
Dept: RADIOLOGY | Facility: HOSPITAL | Age: 40
DRG: 199 | End: 2020-09-01
Payer: COMMERCIAL

## 2020-09-01 ENCOUNTER — HOSPITAL ENCOUNTER (INPATIENT)
Facility: HOSPITAL | Age: 40
LOS: 1 days | DRG: 199 | End: 2020-09-02
Attending: EMERGENCY MEDICINE | Admitting: INTERNAL MEDICINE
Payer: COMMERCIAL

## 2020-09-01 VITALS
OXYGEN SATURATION: 97 % | SYSTOLIC BLOOD PRESSURE: 197 MMHG | HEIGHT: 70 IN | WEIGHT: 225 LBS | DIASTOLIC BLOOD PRESSURE: 100 MMHG | BODY MASS INDEX: 32.21 KG/M2 | HEART RATE: 69 BPM | TEMPERATURE: 97 F | RESPIRATION RATE: 18 BRPM

## 2020-09-01 DIAGNOSIS — I16.1 HYPERTENSIVE EMERGENCY: Primary | ICD-10-CM

## 2020-09-01 DIAGNOSIS — R77.8 ELEVATED TROPONIN: ICD-10-CM

## 2020-09-01 DIAGNOSIS — I16.0 HYPERTENSIVE URGENCY, MALIGNANT: ICD-10-CM

## 2020-09-01 DIAGNOSIS — I21.4 NSTEMI (NON-ST ELEVATED MYOCARDIAL INFARCTION) (HCC): ICD-10-CM

## 2020-09-01 DIAGNOSIS — N17.9 ACUTE KIDNEY INJURY (HCC): ICD-10-CM

## 2020-09-01 DIAGNOSIS — F17.200 NICOTINE DEPENDENCE: ICD-10-CM

## 2020-09-01 DIAGNOSIS — I21.4 NSTEMI (NON-ST ELEVATED MYOCARDIAL INFARCTION) (HCC): Primary | ICD-10-CM

## 2020-09-01 PROBLEM — Z72.0 TOBACCO ABUSE: Status: ACTIVE | Noted: 2020-09-01

## 2020-09-01 PROBLEM — I21.A1 TYPE 2 MI (MYOCARDIAL INFARCTION) (HCC): Status: ACTIVE | Noted: 2020-09-01

## 2020-09-01 PROBLEM — N18.30 CHRONIC KIDNEY DISEASE, STAGE 3 (HCC): Status: ACTIVE | Noted: 2020-09-01

## 2020-09-01 LAB
ALBUMIN SERPL BCP-MCNC: 3.9 G/DL (ref 3.5–5)
ALP SERPL-CCNC: 99 U/L (ref 46–116)
ALT SERPL W P-5'-P-CCNC: 77 U/L (ref 12–78)
ANION GAP SERPL CALCULATED.3IONS-SCNC: 7 MMOL/L (ref 4–13)
ANION GAP SERPL CALCULATED.3IONS-SCNC: 8 MMOL/L (ref 4–13)
APTT PPP: 36 SECONDS (ref 23–37)
AST SERPL W P-5'-P-CCNC: 32 U/L (ref 5–45)
ATRIAL RATE: 108 BPM
ATRIAL RATE: 75 BPM
ATRIAL RATE: 79 BPM
BASOPHILS # BLD AUTO: 0.09 THOUSANDS/ΜL (ref 0–0.1)
BASOPHILS # BLD AUTO: 0.1 THOUSANDS/ΜL (ref 0–0.1)
BASOPHILS NFR BLD AUTO: 1 % (ref 0–1)
BASOPHILS NFR BLD AUTO: 1 % (ref 0–1)
BILIRUB SERPL-MCNC: 0.3 MG/DL (ref 0.2–1)
BUN SERPL-MCNC: 14 MG/DL (ref 5–25)
BUN SERPL-MCNC: 15 MG/DL (ref 5–25)
CALCIUM SERPL-MCNC: 9.3 MG/DL (ref 8.3–10.1)
CALCIUM SERPL-MCNC: 9.8 MG/DL (ref 8.3–10.1)
CHLORIDE SERPL-SCNC: 102 MMOL/L (ref 100–108)
CHLORIDE SERPL-SCNC: 104 MMOL/L (ref 100–108)
CO2 SERPL-SCNC: 28 MMOL/L (ref 21–32)
CO2 SERPL-SCNC: 29 MMOL/L (ref 21–32)
CREAT SERPL-MCNC: 1.43 MG/DL (ref 0.6–1.3)
CREAT SERPL-MCNC: 1.51 MG/DL (ref 0.6–1.3)
EOSINOPHIL # BLD AUTO: 0.24 THOUSAND/ΜL (ref 0–0.61)
EOSINOPHIL # BLD AUTO: 0.45 THOUSAND/ΜL (ref 0–0.61)
EOSINOPHIL NFR BLD AUTO: 2 % (ref 0–6)
EOSINOPHIL NFR BLD AUTO: 4 % (ref 0–6)
ERYTHROCYTE [DISTWIDTH] IN BLOOD BY AUTOMATED COUNT: 13 % (ref 11.6–15.1)
ERYTHROCYTE [DISTWIDTH] IN BLOOD BY AUTOMATED COUNT: 13 % (ref 11.6–15.1)
ERYTHROCYTE [DISTWIDTH] IN BLOOD BY AUTOMATED COUNT: 13.2 % (ref 11.6–15.1)
GFR SERPL CREATININE-BSD FRML MDRD: 57 ML/MIN/1.73SQ M
GFR SERPL CREATININE-BSD FRML MDRD: 60 ML/MIN/1.73SQ M
GLUCOSE SERPL-MCNC: 109 MG/DL (ref 65–140)
GLUCOSE SERPL-MCNC: 115 MG/DL (ref 65–140)
HCT VFR BLD AUTO: 50.2 % (ref 36.5–49.3)
HCT VFR BLD AUTO: 51.1 % (ref 36.5–49.3)
HCT VFR BLD AUTO: 52.2 % (ref 36.5–49.3)
HGB BLD-MCNC: 16.7 G/DL (ref 12–17)
HGB BLD-MCNC: 17.2 G/DL (ref 12–17)
HGB BLD-MCNC: 17.4 G/DL (ref 12–17)
IMM GRANULOCYTES # BLD AUTO: 0.04 THOUSAND/UL (ref 0–0.2)
IMM GRANULOCYTES # BLD AUTO: 0.07 THOUSAND/UL (ref 0–0.2)
IMM GRANULOCYTES NFR BLD AUTO: 0 % (ref 0–2)
IMM GRANULOCYTES NFR BLD AUTO: 1 % (ref 0–2)
INR PPP: 0.96 (ref 0.84–1.19)
LIPASE SERPL-CCNC: 232 U/L (ref 73–393)
LYMPHOCYTES # BLD AUTO: 3.06 THOUSANDS/ΜL (ref 0.6–4.47)
LYMPHOCYTES # BLD AUTO: 4.86 THOUSANDS/ΜL (ref 0.6–4.47)
LYMPHOCYTES NFR BLD AUTO: 20 % (ref 14–44)
LYMPHOCYTES NFR BLD AUTO: 38 % (ref 14–44)
MCH RBC QN AUTO: 28.2 PG (ref 26.8–34.3)
MCH RBC QN AUTO: 28.3 PG (ref 26.8–34.3)
MCH RBC QN AUTO: 28.4 PG (ref 26.8–34.3)
MCHC RBC AUTO-ENTMCNC: 33.3 G/DL (ref 31.4–37.4)
MCHC RBC AUTO-ENTMCNC: 33.3 G/DL (ref 31.4–37.4)
MCHC RBC AUTO-ENTMCNC: 33.7 G/DL (ref 31.4–37.4)
MCV RBC AUTO: 84 FL (ref 82–98)
MCV RBC AUTO: 85 FL (ref 82–98)
MCV RBC AUTO: 85 FL (ref 82–98)
MONOCYTES # BLD AUTO: 1.09 THOUSAND/ΜL (ref 0.17–1.22)
MONOCYTES # BLD AUTO: 1.13 THOUSAND/ΜL (ref 0.17–1.22)
MONOCYTES NFR BLD AUTO: 7 % (ref 4–12)
MONOCYTES NFR BLD AUTO: 9 % (ref 4–12)
NEUTROPHILS # BLD AUTO: 10.68 THOUSANDS/ΜL (ref 1.85–7.62)
NEUTROPHILS # BLD AUTO: 6.19 THOUSANDS/ΜL (ref 1.85–7.62)
NEUTS SEG NFR BLD AUTO: 48 % (ref 43–75)
NEUTS SEG NFR BLD AUTO: 69 % (ref 43–75)
NRBC BLD AUTO-RTO: 0 /100 WBCS
NRBC BLD AUTO-RTO: 0 /100 WBCS
P AXIS: 53 DEGREES
P AXIS: 55 DEGREES
P AXIS: 55 DEGREES
PLATELET # BLD AUTO: 242 THOUSANDS/UL (ref 149–390)
PLATELET # BLD AUTO: 267 THOUSANDS/UL (ref 149–390)
PLATELET # BLD AUTO: 268 THOUSANDS/UL (ref 149–390)
PMV BLD AUTO: 10.1 FL (ref 8.9–12.7)
PMV BLD AUTO: 10.2 FL (ref 8.9–12.7)
PMV BLD AUTO: 10.2 FL (ref 8.9–12.7)
POTASSIUM SERPL-SCNC: 3.9 MMOL/L (ref 3.5–5.3)
POTASSIUM SERPL-SCNC: 4.3 MMOL/L (ref 3.5–5.3)
PR INTERVAL: 146 MS
PR INTERVAL: 148 MS
PR INTERVAL: 150 MS
PROT SERPL-MCNC: 7.1 G/DL (ref 6.4–8.2)
PROTHROMBIN TIME: 12.8 SECONDS (ref 11.6–14.5)
QRS AXIS: 34 DEGREES
QRS AXIS: 36 DEGREES
QRS AXIS: 41 DEGREES
QRSD INTERVAL: 88 MS
QRSD INTERVAL: 88 MS
QRSD INTERVAL: 90 MS
QT INTERVAL: 326 MS
QT INTERVAL: 378 MS
QT INTERVAL: 398 MS
QTC INTERVAL: 433 MS
QTC INTERVAL: 436 MS
QTC INTERVAL: 444 MS
RBC # BLD AUTO: 5.89 MILLION/UL (ref 3.88–5.62)
RBC # BLD AUTO: 6.08 MILLION/UL (ref 3.88–5.62)
RBC # BLD AUTO: 6.18 MILLION/UL (ref 3.88–5.62)
SODIUM SERPL-SCNC: 137 MMOL/L (ref 136–145)
SODIUM SERPL-SCNC: 141 MMOL/L (ref 136–145)
T WAVE AXIS: 61 DEGREES
T WAVE AXIS: 66 DEGREES
T WAVE AXIS: 71 DEGREES
TROPONIN I SERPL-MCNC: 0.4 NG/ML
TROPONIN I SERPL-MCNC: 1.12 NG/ML
TROPONIN I SERPL-MCNC: 3.33 NG/ML
TROPONIN I SERPL-MCNC: 5.57 NG/ML
TROPONIN I SERPL-MCNC: <0.02 NG/ML
TSH SERPL DL<=0.05 MIU/L-ACNC: 1.5 UIU/ML (ref 0.36–3.74)
VENTRICULAR RATE: 108 BPM
VENTRICULAR RATE: 75 BPM
VENTRICULAR RATE: 79 BPM
WBC # BLD AUTO: 12.77 THOUSAND/UL (ref 4.31–10.16)
WBC # BLD AUTO: 13.63 THOUSAND/UL (ref 4.31–10.16)
WBC # BLD AUTO: 15.23 THOUSAND/UL (ref 4.31–10.16)

## 2020-09-01 PROCEDURE — 84484 ASSAY OF TROPONIN QUANT: CPT | Performed by: EMERGENCY MEDICINE

## 2020-09-01 PROCEDURE — 99253 IP/OBS CNSLTJ NEW/EST LOW 45: CPT | Performed by: INTERNAL MEDICINE

## 2020-09-01 PROCEDURE — 99284 EMERGENCY DEPT VISIT MOD MDM: CPT

## 2020-09-01 PROCEDURE — 84484 ASSAY OF TROPONIN QUANT: CPT

## 2020-09-01 PROCEDURE — 94664 DEMO&/EVAL PT USE INHALER: CPT

## 2020-09-01 PROCEDURE — 99285 EMERGENCY DEPT VISIT HI MDM: CPT

## 2020-09-01 PROCEDURE — 93005 ELECTROCARDIOGRAM TRACING: CPT

## 2020-09-01 PROCEDURE — 99285 EMERGENCY DEPT VISIT HI MDM: CPT | Performed by: EMERGENCY MEDICINE

## 2020-09-01 PROCEDURE — 93010 ELECTROCARDIOGRAM REPORT: CPT | Performed by: INTERNAL MEDICINE

## 2020-09-01 PROCEDURE — 99291 CRITICAL CARE FIRST HOUR: CPT | Performed by: EMERGENCY MEDICINE

## 2020-09-01 PROCEDURE — 99223 1ST HOSP IP/OBS HIGH 75: CPT | Performed by: INTERNAL MEDICINE

## 2020-09-01 PROCEDURE — 80053 COMPREHEN METABOLIC PANEL: CPT | Performed by: EMERGENCY MEDICINE

## 2020-09-01 PROCEDURE — 71046 X-RAY EXAM CHEST 2 VIEWS: CPT

## 2020-09-01 PROCEDURE — 83690 ASSAY OF LIPASE: CPT | Performed by: EMERGENCY MEDICINE

## 2020-09-01 PROCEDURE — 80048 BASIC METABOLIC PNL TOTAL CA: CPT | Performed by: EMERGENCY MEDICINE

## 2020-09-01 PROCEDURE — 84443 ASSAY THYROID STIM HORMONE: CPT | Performed by: INTERNAL MEDICINE

## 2020-09-01 PROCEDURE — 85027 COMPLETE CBC AUTOMATED: CPT | Performed by: NURSE PRACTITIONER

## 2020-09-01 PROCEDURE — 85730 THROMBOPLASTIN TIME PARTIAL: CPT | Performed by: NURSE PRACTITIONER

## 2020-09-01 PROCEDURE — 84484 ASSAY OF TROPONIN QUANT: CPT | Performed by: INTERNAL MEDICINE

## 2020-09-01 PROCEDURE — 36415 COLL VENOUS BLD VENIPUNCTURE: CPT | Performed by: EMERGENCY MEDICINE

## 2020-09-01 PROCEDURE — 85025 COMPLETE CBC W/AUTO DIFF WBC: CPT | Performed by: EMERGENCY MEDICINE

## 2020-09-01 PROCEDURE — 85610 PROTHROMBIN TIME: CPT | Performed by: NURSE PRACTITIONER

## 2020-09-01 RX ORDER — NICOTINE 21 MG/24HR
1 PATCH, TRANSDERMAL 24 HOURS TRANSDERMAL DAILY
Status: DISCONTINUED | OUTPATIENT
Start: 2020-09-01 | End: 2020-09-02 | Stop reason: HOSPADM

## 2020-09-01 RX ORDER — HYDRALAZINE HYDROCHLORIDE 20 MG/ML
10 INJECTION INTRAMUSCULAR; INTRAVENOUS EVERY 6 HOURS PRN
Status: DISCONTINUED | OUTPATIENT
Start: 2020-09-01 | End: 2020-09-02 | Stop reason: HOSPADM

## 2020-09-01 RX ORDER — LABETALOL 20 MG/4 ML (5 MG/ML) INTRAVENOUS SYRINGE
10 ONCE
Status: DISCONTINUED | OUTPATIENT
Start: 2020-09-01 | End: 2020-09-01

## 2020-09-01 RX ORDER — FAMOTIDINE 20 MG/1
20 TABLET, FILM COATED ORAL ONCE
Status: COMPLETED | OUTPATIENT
Start: 2020-09-01 | End: 2020-09-01

## 2020-09-01 RX ORDER — HYDRALAZINE HYDROCHLORIDE 20 MG/ML
10 INJECTION INTRAMUSCULAR; INTRAVENOUS ONCE
Status: COMPLETED | OUTPATIENT
Start: 2020-09-01 | End: 2020-09-01

## 2020-09-01 RX ORDER — ASPIRIN 81 MG/1
324 TABLET, CHEWABLE ORAL ONCE
Status: COMPLETED | OUTPATIENT
Start: 2020-09-01 | End: 2020-09-01

## 2020-09-01 RX ORDER — HEPARIN SODIUM 1000 [USP'U]/ML
4000 INJECTION, SOLUTION INTRAVENOUS; SUBCUTANEOUS
Status: DISCONTINUED | OUTPATIENT
Start: 2020-09-01 | End: 2020-09-02 | Stop reason: HOSPADM

## 2020-09-01 RX ORDER — ASPIRIN 325 MG
325 TABLET ORAL DAILY
Status: DISCONTINUED | OUTPATIENT
Start: 2020-09-02 | End: 2020-09-02

## 2020-09-01 RX ORDER — HEPARIN SODIUM 1000 [USP'U]/ML
2000 INJECTION, SOLUTION INTRAVENOUS; SUBCUTANEOUS
Status: DISCONTINUED | OUTPATIENT
Start: 2020-09-01 | End: 2020-09-02 | Stop reason: HOSPADM

## 2020-09-01 RX ORDER — AMLODIPINE BESYLATE 5 MG/1
5 TABLET ORAL DAILY
Status: DISCONTINUED | OUTPATIENT
Start: 2020-09-01 | End: 2020-09-02 | Stop reason: HOSPADM

## 2020-09-01 RX ORDER — NITROGLYCERIN 0.4 MG/1
0.4 TABLET SUBLINGUAL
Status: DISCONTINUED | OUTPATIENT
Start: 2020-09-01 | End: 2020-09-01 | Stop reason: HOSPADM

## 2020-09-01 RX ORDER — SUCRALFATE ORAL 1 G/10ML
1000 SUSPENSION ORAL ONCE
Status: COMPLETED | OUTPATIENT
Start: 2020-09-01 | End: 2020-09-01

## 2020-09-01 RX ORDER — HEPARIN SODIUM 1000 [USP'U]/ML
4000 INJECTION, SOLUTION INTRAVENOUS; SUBCUTANEOUS ONCE
Status: COMPLETED | OUTPATIENT
Start: 2020-09-01 | End: 2020-09-01

## 2020-09-01 RX ORDER — HEPARIN SODIUM 10000 [USP'U]/100ML
3-20 INJECTION, SOLUTION INTRAVENOUS
Status: DISCONTINUED | OUTPATIENT
Start: 2020-09-01 | End: 2020-09-02 | Stop reason: HOSPADM

## 2020-09-01 RX ORDER — ONDANSETRON 2 MG/ML
4 INJECTION INTRAMUSCULAR; INTRAVENOUS EVERY 6 HOURS PRN
Status: DISCONTINUED | OUTPATIENT
Start: 2020-09-01 | End: 2020-09-02 | Stop reason: HOSPADM

## 2020-09-01 RX ORDER — HYDRALAZINE HYDROCHLORIDE 25 MG/1
25 TABLET, FILM COATED ORAL 3 TIMES DAILY PRN
Status: DISCONTINUED | OUTPATIENT
Start: 2020-09-01 | End: 2020-09-01

## 2020-09-01 RX ORDER — NITROGLYCERIN 0.4 MG/1
0.4 TABLET SUBLINGUAL
Status: DISCONTINUED | OUTPATIENT
Start: 2020-09-01 | End: 2020-09-02 | Stop reason: HOSPADM

## 2020-09-01 RX ORDER — ACETAMINOPHEN 325 MG/1
650 TABLET ORAL EVERY 6 HOURS PRN
Status: DISCONTINUED | OUTPATIENT
Start: 2020-09-01 | End: 2020-09-02 | Stop reason: HOSPADM

## 2020-09-01 RX ORDER — ATORVASTATIN CALCIUM 40 MG/1
40 TABLET, FILM COATED ORAL
Status: DISCONTINUED | OUTPATIENT
Start: 2020-09-01 | End: 2020-09-02 | Stop reason: HOSPADM

## 2020-09-01 RX ORDER — HYDRALAZINE HYDROCHLORIDE 25 MG/1
25 TABLET, FILM COATED ORAL EVERY 8 HOURS SCHEDULED
Status: DISCONTINUED | OUTPATIENT
Start: 2020-09-01 | End: 2020-09-02

## 2020-09-01 RX ORDER — ACETAMINOPHEN 325 MG/1
650 TABLET ORAL ONCE AS NEEDED
Status: DISCONTINUED | OUTPATIENT
Start: 2020-09-01 | End: 2020-09-01 | Stop reason: HOSPADM

## 2020-09-01 RX ADMIN — HYDRALAZINE HYDROCHLORIDE 10 MG: 20 INJECTION INTRAMUSCULAR; INTRAVENOUS at 15:52

## 2020-09-01 RX ADMIN — NITROGLYCERIN 0.4 MG: 0.4 TABLET SUBLINGUAL at 08:24

## 2020-09-01 RX ADMIN — HYDRALAZINE HYDROCHLORIDE 25 MG: 25 TABLET, FILM COATED ORAL at 22:33

## 2020-09-01 RX ADMIN — ATORVASTATIN CALCIUM 40 MG: 40 TABLET, FILM COATED ORAL at 15:53

## 2020-09-01 RX ADMIN — ACETAMINOPHEN 650 MG: 325 TABLET ORAL at 20:31

## 2020-09-01 RX ADMIN — NICOTINE 1 PATCH: 21 PATCH, EXTENDED RELEASE TRANSDERMAL at 15:51

## 2020-09-01 RX ADMIN — AMLODIPINE BESYLATE 5 MG: 5 TABLET ORAL at 17:20

## 2020-09-01 RX ADMIN — ASPIRIN 81 MG 324 MG: 81 TABLET ORAL at 13:27

## 2020-09-01 RX ADMIN — FAMOTIDINE 20 MG: 20 TABLET, FILM COATED ORAL at 08:22

## 2020-09-01 RX ADMIN — HEPARIN SODIUM 4000 UNITS: 1000 INJECTION INTRAVENOUS; SUBCUTANEOUS at 22:25

## 2020-09-01 RX ADMIN — ENOXAPARIN SODIUM 40 MG: 100 INJECTION SUBCUTANEOUS at 15:52

## 2020-09-01 RX ADMIN — NITROGLYCERIN 1 INCH: 20 OINTMENT TOPICAL at 13:22

## 2020-09-01 RX ADMIN — HEPARIN SODIUM 11.1 UNITS/KG/HR: 10000 INJECTION, SOLUTION INTRAVENOUS at 22:23

## 2020-09-01 RX ADMIN — SUCRALFATE 1000 MG: 1 SUSPENSION ORAL at 08:22

## 2020-09-01 NOTE — ASSESSMENT & PLAN NOTE
In the setting of hypertensive urgency  Monitor on telemetry  Serial troponins, echocardiogram and EKG  Cardiology consult appreciated  Nitro p r n  Treat underline hypertension  Lipid panel, TSH  Will start on aspirin, Lipitor  As per Cardiology echocardiogram in a m  And if it is significantly abnormal then consider transferring to [place]  If echocardiogram does not show significant LV dysfunction then they will proceed with stress test if blood pressure improved    Avoid beta-blockers for now per Cardiology

## 2020-09-01 NOTE — ED PROVIDER NOTES
History  Chief Complaint   Patient presents with    Heartburn     pt woke up with extreme acid reflux  states he has hxn  patient also saying his left arm hurts  denies sob  71-year-old male presents for evaluation of epigastric pain which began this morning  Patient states the pain woke him from sleep  Described as a burning sensation feeling like heartburn  He took some Tums had improvement of symptoms  The patient also has ringing in his ears and feels that that happens when his blood pressure gets high  The patient denies any shortness of breath  Prior to Admission Medications   Prescriptions Last Dose Informant Patient Reported? Taking? methocarbamol (ROBAXIN) 500 mg tablet Not Taking at Unknown time  No No   Sig: Take 1 tablet (500 mg total) by mouth 3 (three) times a day   Patient not taking: Reported on 9/1/2020      Facility-Administered Medications: None       Past Medical History:   Diagnosis Date    Chronic pain     GERD (gastroesophageal reflux disease)     Hypertension        Past Surgical History:   Procedure Laterality Date    NO PAST SURGERIES         History reviewed  No pertinent family history  I have reviewed and agree with the history as documented  E-Cigarette/Vaping    E-Cigarette Use Never User      E-Cigarette/Vaping Substances    Nicotine No     Flavoring No      Social History     Tobacco Use    Smoking status: Current Every Day Smoker     Packs/day: 1 00    Smokeless tobacco: Never Used   Substance Use Topics    Alcohol use: Yes     Comment: social    Drug use: Never       Review of Systems   Cardiovascular: Positive for chest pain  Gastrointestinal: Positive for abdominal pain ( epigastric)  All other systems reviewed and are negative  Physical Exam  Physical Exam  Vitals signs and nursing note reviewed  Constitutional:       General: He is not in acute distress  Appearance: He is well-developed     HENT:      Head: Normocephalic and atraumatic  Right Ear: External ear normal       Left Ear: External ear normal       Mouth/Throat:      Pharynx: No oropharyngeal exudate  Eyes:      General: No scleral icterus  Pupils: Pupils are equal, round, and reactive to light  Neck:      Musculoskeletal: Normal range of motion and neck supple  Cardiovascular:      Rate and Rhythm: Normal rate and regular rhythm  Heart sounds: Normal heart sounds  Pulmonary:      Effort: Pulmonary effort is normal  No respiratory distress  Breath sounds: Normal breath sounds  Abdominal:      General: Bowel sounds are normal       Palpations: Abdomen is soft  Tenderness: There is no abdominal tenderness  There is no guarding or rebound  Musculoskeletal: Normal range of motion  Skin:     General: Skin is warm and dry  Findings: No rash  Neurological:      Mental Status: He is alert and oriented to person, place, and time           Vital Signs  ED Triage Vitals [09/01/20 0723]   Temperature Pulse Respirations Blood Pressure SpO2   (!) 97 °F (36 1 °C) 79 18 (!) 208/126 99 %      Temp Source Heart Rate Source Patient Position - Orthostatic VS BP Location FiO2 (%)   Temporal Monitor Sitting -- --      Pain Score       6           Vitals:    09/01/20 0930 09/01/20 1000 09/01/20 1045 09/01/20 1100   BP: 163/84 155/94 (!) 206/105 (!) 197/100   Pulse: 55 59 56 69   Patient Position - Orthostatic VS:             Visual Acuity      ED Medications  Medications   nitroglycerin (NITROSTAT) SL tablet 0 4 mg (0 4 mg Sublingual Given 9/1/20 0824)   acetaminophen (TYLENOL) tablet 650 mg (has no administration in time range)   famotidine (PEPCID) tablet 20 mg (20 mg Oral Given 9/1/20 0822)   sucralfate (CARAFATE) oral suspension 1,000 mg (1,000 mg Oral Given 9/1/20 5387)       Diagnostic Studies  Results Reviewed     Procedure Component Value Units Date/Time    Troponin I repeat in 3hrs [[id number]]  (Abnormal) Collected:  09/01/20 1034    Lab Status: Final result Specimen:  Blood from Arm, Right Updated:  09/01/20 1100     Troponin I 0 40 ng/mL     Troponin I [[id number]]  (Normal) Collected:  09/01/20 0736    Lab Status:  Final result Specimen:  Blood from Arm, Right Updated:  09/01/20 0801     Troponin I <0 02 ng/mL     Comprehensive metabolic panel [[id number]]  (Abnormal) Collected:  09/01/20 0736    Lab Status:  Final result Specimen:  Blood from Arm, Right Updated:  09/01/20 0759     Sodium 141 mmol/L      Potassium 3 9 mmol/L      Chloride 104 mmol/L      CO2 29 mmol/L      ANION GAP 8 mmol/L      BUN 15 mg/dL      Creatinine 1 43 mg/dL      Glucose 115 mg/dL      Calcium 9 3 mg/dL      AST 32 U/L      ALT 77 U/L      Alkaline Phosphatase 99 U/L      Total Protein 7 1 g/dL      Albumin 3 9 g/dL      Total Bilirubin 0 30 mg/dL      eGFR 60 ml/min/1 73sq m     Narrative:       [facility] guidelines for Chronic Kidney Disease (CKD):     Stage 1 with normal or high GFR (GFR > 90 mL/min/1 73 square meters)    Stage 2 Mild CKD (GFR = 60-89 mL/min/1 73 square meters)    Stage 3A Moderate CKD (GFR = 45-59 mL/min/1 73 square meters)    Stage 3B Moderate CKD (GFR = 30-44 mL/min/1 73 square meters)    Stage 4 Severe CKD (GFR = 15-29 mL/min/1 73 square meters)    Stage 5 End Stage CKD (GFR <15 mL/min/1 73 square meters)  Note: GFR calculation is accurate only with a steady state creatinine    Lipase [[id number]]  (Normal) Collected:  09/01/20 0736    Lab Status:  Final result Specimen:  Blood from Arm, Right Updated:  09/01/20 0759     Lipase 232 u/L     CBC and differential [[id number]]  (Abnormal) Collected:  09/01/20 0736    Lab Status:  Final result Specimen:  Blood from Arm, Right Updated:  09/01/20 0741     WBC 12 77 Thousand/uL      RBC 6 18 Million/uL      Hemoglobin 17 4 g/dL      Hematocrit 52 2 %      MCV 85 fL      MCH 28 2 pg      MCHC 33 3 g/dL      RDW 13 0 %      MPV 10 1 fL      Platelets 595 Thousands/uL      nRBC 0 /100 WBCs      Neutrophils Relative 48 %      Immat GRANS % 0 %      Lymphocytes Relative 38 %      Monocytes Relative 9 %      Eosinophils Relative 4 %      Basophils Relative 1 %      Neutrophils Absolute 6 19 Thousands/µL      Immature Grans Absolute 0 04 Thousand/uL      Lymphocytes Absolute 4 86 Thousands/µL      Monocytes Absolute 1 13 Thousand/µL      Eosinophils Absolute 0 45 Thousand/µL      Basophils Absolute 0 10 Thousands/µL                  XR chest 2 views   ED Interpretation by Cyril Gutiérrez DO (09/01 0819)   ED Provider X-ray Interpretation    My X-ray interpretation of the Chest: was negative for infiltrate, effusion, pneumothorax, or wide mediastinum    Cyril Gutiérrez DO      Final Result by Rylee Escobedo MD (09/01 9397)      No acute cardiopulmonary disease  Workstation performed: [id number]                    Procedures  ECG 12 Lead Documentation Only    Date/Time: 9/1/2020 7:28 AM  Performed by: Cyril Gutiérrez DO  Authorized by: Cyril Gutiérrez DO     Indications / Diagnosis:  Epigastric pain  ECG reviewed by me, the ED Provider: yes    Patient location:  ED  Previous ECG:     Previous ECG:  Compared to current    Comparison ECG info:  9/26/2019    Similarity:  No change  Interpretation:     Interpretation: normal    Rate:     ECG rate:  79    ECG rate assessment: normal    Rhythm:     Rhythm: sinus rhythm    Ectopy:     Ectopy: none    QRS:     QRS axis:  Normal    QRS intervals:  Normal  Conduction:     Conduction: normal    ST segments:     ST segments:  Normal  T waves:     T waves: inverted      Inverted:  AVL             ED Course  ED Course as of Sep 01 1130   Tue Sep 01, 2020   8340 Patient complaining of burning in the epigastric region  The patient's blood pressure is 196/104  The plan is for Carafate, Pepcid nitroglycerin  Tylenol p r n  For mitral headache      6513 Patient noted to have run of nonsustained ventricular tachycardia on EKG    I informed the patient of this as well as my concern regarding his uncontrolled hypertension and chest pain  I told him that my recommendation would be for admission  The patient states that he cannot stay because he has kids  I advised him to look for childcare arrangements however he said he would not stay even after informed of the risk heart damage and the fact that he would be leaving against medical advice  1045 Repeat EKG interpreted by me  Demonstrates a normal sinus rhythm at 75 beats per minute  There are no new acute ischemic changes  It is unchanged from the 1st EKG this morning  1121 Patient informed of elevated troponin at 0 40  I discussed the need for admission and further evaluation  The patient is adamant about having a cigarette and getting his phone from his truck  I offered the patient a nicotine patch, nicotine gum as well as anxiolytics medication  The patient is refusing  The patient understands that he would sign out against medical advice even if it is just to go have a cigarette and then return  Patient understands the risk that he is having heart attack which could lead to death or disability      1128 Patient left [address] after informed discussion about risks of leaving without elevated troponin that is 10 times normal   Patient understands that he is welcome to return at any time for further evaluation and care of his symptoms  All questions were answered prior to leaving  US AUDIT      Most Recent Value   Initial Alcohol Screen: US AUDIT-C    1  How often do you have a drink containing alcohol?  0 Filed at: 09/01/2020 0722   2  How many drinks containing alcohol do you have on a typical day you are drinking? 0 Filed at: 09/01/2020 0722   3a  Male UNDER 65: How often do you have five or more drinks on one occasion? 0 Filed at: 09/01/2020 0722   3b  FEMALE Any Age, or MALE 65+: How often do you have 4 or more drinks on one occassion?   0 Filed at: 09/01/2020 4969 Audit-C Score  0 Filed at: 09/01/2020 6818            HEART Risk Score      Most Recent Value   Heart Score Risk Calculator   History  1 Filed at: 09/01/2020 1129   ECG  0 Filed at: 09/01/2020 1129   Age  0 Filed at: 09/01/2020 1129   Risk Factors  2 Filed at: 09/01/2020 1129   Troponin  2 Filed at: 09/01/2020 1129   HEART Score  5 Filed at: 09/01/2020 1129            DIONICIO/DAST-10      Most Recent Value   How many times in the past year have you    Used an illegal drug or used a prescription medication for non-medical reasons?   Never Filed at: 09/01/2020 4364                                MDM  Number of Diagnoses or Management Options  Elevated troponin:   Hypertensive urgency, malignant:   Nicotine dependence:   NSTEMI (non-ST elevated myocardial infarction) [facility]):   Diagnosis management comments: Differential diagnosis:  Acid reflux, pancreatitis, cholecystitis, acute coronary syndrome, esophagitis, chest wall pain, other  Plan is for EKG, chest x-ray laboratories including troponin x2       Amount and/or Complexity of Data Reviewed  Clinical lab tests: reviewed and ordered  Tests in the radiology section of CPT®: reviewed and ordered  Tests in the medicine section of CPT®: ordered and reviewed  Decide to obtain previous medical records or to obtain history from someone other than the patient: yes  Review and summarize past medical records: yes  Independent visualization of images, tracings, or specimens: yes          Disposition  Final diagnoses:   [facility]STEM[facility] (non[facility]ST elevated myocardial [facility]) [facility] )   Elevated troponin   Hypertensive urgency, malignant   Nicotine dependence     Time reflects when diagnosis was documented in both MDM as applicable and the Disposition within this note     Time User Action Codes Description Comment    9/1/2020 11:23 AM Lavera Ramp B Add [I21 4] NSTEMI (non-ST elevated myocardial infarction) ([facility] )     9/1/2020 11:23 AM Lavera Ramp B Add [R79 89] Elevated troponin 9/1/2020 11:23 AM Jovita ACUÑA Add [I16 0] Hypertensive urgency, malignant     9/1/2020 11:24 AM Spencer Carlos Add [F17 200] Nicotine dependence       ED Disposition     ED Disposition Condition Date/Time Comment    GHANSHYAM Biswas Sep 1, 2020 11:23 AM Date: 9/1/2020  Patient: Eitan Jones  Admitted: 9/1/2020  7:19 AM  Attending Provider: Sherwin Soria DO    Eitan Jones or his authorized caregiver has made the decision for the patient to leave the emergency department against the advice of the [facility] department staff  He or his authorized caregiver has been informed and understands the inherent risks, including death, heart attack, disability, loss of current lifestyle  He or his authorized caregiver has decided to accept the responsibi lity for this decision  Eitan Jones and all necessary parties have been advised that he may return for further evaluation or treatment  His condition at time of discharge was fair  Eitan Jones had current vital signs as follows:  BP (!) 197/100    Pulse 69   Temp (!) 97 °F (36 1 °C) (Temporal)   Resp 18   Ht 5' 10" (1 778 m)   Wt 102 kg (225 lb)         Follow-up Information     Follow up With Specialties Details Why Contact Info Additional Information    Naina Cao MD Internal Medicine Schedule an appointment as soon as possible for a visit   [address] Emergency Department Emergency Medicine Go to  If symptoms worsen [address] [place], [address]  [phone]  ED, [address] [place] [address] 10          Discharge Medication List as of 9/1/2020 11:24 AM      CONTINUE these medications which have NOT CHANGED    Details   methocarbamol (ROBAXIN) 500 mg tablet Take 1 tablet (500 mg total) by mouth 3 (three) times a day, Starting Thu 7/30/2020, Normal           No discharge procedures on file      PDMP Review       Value Time User PDMP Reviewed  Yes 10/25/2019  7:38 PM Joon Dye PA-C          ED Provider  Electronically Signed by           Armond Alfonso DO  09/01/20 5169

## 2020-09-01 NOTE — ASSESSMENT & PLAN NOTE
Start on Norvasc and hydralazine  Monitor vitals as per protocol  Hydralazine p r n   Systolic blood pressure greater than 160

## 2020-09-01 NOTE — H&P
H&P- Jeannette Manley 1980, 38 y o  male MRN: [id number]    Unit/Bed#: -01 Encounter: [id number]    Primary Care Provider: Beatriz Huang MD   Date and time admitted to hospital: 9/1/2020  1:06 PM        Chronic kidney disease, stage 3 [facility])  Assessment & Plan  Patient presented with creatinine of 1 51  Baseline is from 1 27-1 39  Avoid hypotension/nephrotoxins medication  Urinary retention protocol  Monitor renal function in a m  Tobacco abuse  Assessment & Plan  Smoking cessation counseling given  On nicotine patch    GERD (gastroesophageal reflux disease)  Assessment & Plan  On Protonix    Hypertensive urgency  Assessment & Plan  Start on Norvasc and hydralazine  Monitor vitals as per protocol  Hydralazine p r n  Systolic blood pressure greater than 160    * Type 2 MI (myocardial infarction) [facility])  Assessment & Plan  In the setting of hypertensive urgency  Monitor on telemetry  Serial troponins, echocardiogram and EKG  Cardiology consult appreciated  Nitro p r n  Treat underline hypertension  Lipid panel, TSH  Will start on aspirin, Lipitor  As per Cardiology echocardiogram in a m  And if it is significantly abnormal then consider transferring to [place]  If echocardiogram does not show significant LV dysfunction then they will proceed with stress test if blood pressure improved  Avoid beta-blockers for now per Cardiology    Anticipated length of stay greater than 2 midnights  Chief Complaint   Patient presents with    Chest Pain     patient presents to the ED to be admitted per the advisement of his last ED visit one hour ago  patient left AMA and has returned for admission         HPI:  Jeannette Manley is a 38 y o  male with history of GERD, tobacco abuse presented to the emergency department with complain of epigastric discomfort, nausea and lightheadedness    Patient states that he woke up this morning around 6:00 a m  and felt reflux like symptoms, has associated nausea, ringing sensation in ears and some burning pain in the left arm  Patient states that he had been in this ER a few months ago with similar reflux like symptoms and his symptoms improved with GI cocktail and was discharged on Nexium  He also admits that he has been told by doctors in the past that his blood pressure is elevated but he has never been on any medications  He states that he had some nausea and ringing sensation in his ears(which is chronic) and associated lightheadedness  He came to the ER  Patient signed out AMA as he had to take care of some stuff at home  He came back again to ER  His initial troponin was negative and repeat was 0 40 and it went up to 1  12  Patient denies any chest pain, shortness of breath, palpitations, vomiting, dizziness, abdominal pain, urinary complaints  Patient is a chronic smoker and smokes 1 pack a day  Patient was given Carafate, Pepcid, nitroglycerin and he signed out AMA  Patient complains of chronic left shoulder pain as he has history of Torn rotator cuff and states that his left shoulder is always painful  ER physician spoke with cardiologist who recommended treating patient's hypertension and admitting patient  No need for transfer for cardiac catheterization at this time  Patient remains chest pain-free  Historical Information   Past Medical History:   Diagnosis Date    Chronic pain     GERD (gastroesophageal reflux disease)     Hypertension      Past Surgical History:   Procedure Laterality Date    NO PAST SURGERIES       Social History   Social History     Substance and Sexual Activity   Alcohol Use Yes    Comment: social     Social History     Substance and Sexual Activity   Drug Use Never     Social History     Tobacco Use   Smoking Status Current Every Day Smoker    Packs/day: 1 00   Smokeless Tobacco Never Used     History reviewed  No pertinent family history      Meds/Allergies   No Known Allergies    Meds:    Current Facility-Administered Medications:     acetaminophen (TYLENOL) tablet 650 mg, 650 mg, Oral, Q6H PRN, Louie Restrepo MD    amLODIPine (NORVASC) tablet 5 mg, 5 mg, Oral, Daily, Chava Goldberg MD    atorvastatin (LIPITOR) tablet 40 mg, 40 mg, Oral, Daily With Sharon Vitale MD, 40 mg at 09/01/20 1553    enoxaparin (LOVENOX) subcutaneous injection 40 mg, 40 mg, Subcutaneous, Daily, Louie Restrepo MD, 40 mg at 09/01/20 1552    hydrALAZINE (APRESOLINE) tablet 25 mg, 25 mg, Oral, TID PRN, Chava Goldberg MD    nicotine (NICODERM CQ) 21 mg/24 hr TD 24 hr patch 1 patch, 1 patch, Transdermal, Daily, Louie Restrepo MD, 1 patch at 09/01/20 1551    nitroglycerin (NITROSTAT) SL tablet 0 4 mg, 0 4 mg, Sublingual, Q5 Min PRN, Louie Restrepo MD    ondansetron (ZOFRAN) injection 4 mg, 4 mg, Intravenous, Q6H PRN, Louie Restrepo MD    No medications prior to admission  Review of Systems   Constitutional: Positive for activity change and fatigue  HENT: Positive for tinnitus  Eyes: Negative  Respiratory: Negative  Cardiovascular: Negative  Gastrointestinal: Positive for nausea  Endocrine: Negative  Genitourinary: Negative  Musculoskeletal: Negative  Skin: Negative  Allergic/Immunologic: Negative  Neurological: Positive for light-headedness  Hematological: Negative  Psychiatric/Behavioral: Negative          Current Vitals:   Blood Pressure: (!) 179/123 (09/01/20 1543)  Pulse: 70 (09/01/20 1543)  Temperature: 98 9 °F (37 2 °C) (09/01/20 1543)  Temp Source: Temporal (09/01/20 1310)  Respirations: 19 (09/01/20 1543)  Height: 5' 10" (177 8 cm) (09/01/20 1310)  Weight - Scale: 102 kg (225 lb) (09/01/20 1310)  SpO2: 97 % (09/01/20 1543)  SPO2 RA Rest      ED to Hosp-Admission (Current) from 9/1/2020 in [address] Med [facility]urg [facility]   SpO2  97 %   SpO2 Activity  At Rest   O2 Device  None (Room air)  (Pended)    O2 Flow Rate          No intake or output data in the 24 hours ending 09/01/20 1616  Body mass index is 32 28 kg/m²  Physical Exam  Vitals signs and nursing note reviewed  Constitutional:       General: He is not in acute distress  Appearance: He is well-developed  HENT:      Head: Normocephalic and atraumatic  Eyes:      General: No scleral icterus  Conjunctiva/sclera: Conjunctivae normal       Pupils: Pupils are equal, round, and reactive to light  Neck:      Musculoskeletal: Normal range of motion and neck supple  Thyroid: No thyromegaly  Vascular: No JVD  Cardiovascular:      Rate and Rhythm: Normal rate and regular rhythm  Heart sounds: Normal heart sounds  Pulmonary:      Effort: Pulmonary effort is normal  No respiratory distress  Breath sounds: Normal breath sounds  No wheezing or rales  Chest:      Chest wall: No tenderness  Abdominal:      General: Bowel sounds are normal  There is no distension  Palpations: Abdomen is soft  There is no mass  Tenderness: There is no abdominal tenderness  There is no guarding or rebound  Musculoskeletal: Normal range of motion  General: No tenderness or deformity  Skin:     General: Skin is warm  Coloration: Skin is not pale  Findings: No erythema or rash  Neurological:      General: No focal deficit present  Mental Status: He is alert and oriented to person, place, and time  Cranial Nerves: No cranial nerve deficit        Coordination: Coordination normal    Psychiatric:         Behavior: Behavior normal          Judgment: Judgment normal          Lab Results:   CBC:   Lab Results   Component Value Date    WBC 15 23 (H) 09/01/2020    HGB 17 2 (H) 09/01/2020    HCT 51 1 (H) 09/01/2020    MCV 84 09/01/2020     09/01/2020    MCH 28 3 09/01/2020    MCHC 33 7 09/01/2020    RDW 13 0 09/01/2020    MPV 10 2 09/01/2020    NRBC 0 09/01/2020     CMP:  Lab Results   Component Value Date     09/01/2020    CO2 28 09/01/2020    BUN 14 09/01/2020 CREATININE 1 51 (H) 09/01/2020    CALCIUM 9 8 09/01/2020    AST 32 09/01/2020    ALT 77 09/01/2020    ALKPHOS 99 09/01/2020    EGFR 57 09/01/2020     Lab Results   Component Value Date    TROPONINI 1 12 (H) 09/01/2020     Coagulation:   Lab Results   Component Value Date    INR 1 02 02/27/2019    Urinalysis:  Lab Results   Component Value Date    COLORU yellow 07/30/2020    COLORU Yellow 07/24/2020    CLARITYU clear 07/30/2020    CLARITYU Clear 07/24/2020    SPECGRAV 1 015 07/30/2020    SPECGRAV 1 020 07/24/2020    PHUR 5 07/30/2020    PHUR 7 0 02/27/2019    LEUKOCYTESUR negative 07/30/2020    LEUKOCYTESUR Negative 07/24/2020    NITRITE negative 07/30/2020    NITRITE Negative 07/24/2020    GLUCOSEU negative 07/30/2020    GLUCOSEU Negative 07/24/2020    KETONESU negative 07/30/2020    KETONESU Negative 07/24/2020    BILIRUBINUR negative 07/30/2020    BILIRUBINUR Negative 07/24/2020    BLOODU negative 07/30/2020    BLOODU Negative 07/24/2020      Amylase: No results found for: AMYLASE  Lipase:   Lab Results   Component Value Date    LIPASE 232 09/01/2020        Imaging: Xr Chest 2 Views    Result Date: 9/1/2020  Narrative: CHEST INDICATION:   chest pain  COMPARISON:  None EXAM PERFORMED/VIEWS:  XR CHEST PA & LATERAL FINDINGS: Cardiomediastinal silhouette appears unremarkable  Linear subsegmental atelectasis in the lingula  No focal consolidation, pleural effusion or pneumothorax  Osseous structures appear within normal limits for patient age  Impression: No acute cardiopulmonary disease  Workstation performed: [id number]     EKG, Pathology, and Other Studies: I have personally reviewed the results  VTE Pharmacologic Prophylaxis: Enoxaparin (Lovenox)  VTE Mechanical Prophylaxis: sequential compression device    Code Status: Level 1 - Full Code    Counseling / Coordination of Care  Total floor / unit time spent today 72 minutes    Greater than 50% of total time was spent with the patient and / or family counseling and / or coordination of care       "This note has been constructed using a voice recognition system"      Bishop Eileen MD  9/1/2020, 4:16 PM

## 2020-09-01 NOTE — CONSULTS
Consultation - Cardiology Team One  Sujit Dennison 38 y o  male MRN: [id number]  Unit/Bed#: ED 06 Encounter: [id number]    Inpatient consult to Cardiology  Consult performed by: TERRENCE Hernandez  Consult ordered by: Daryl Jordan DO          Physician Requesting Consult: Daryl Jordan DO  Reason for Consult / Principal Problem: elevated troponin/epigastric pain      Assessment/ Plan    1  Non-MI elevated troponin:   · Patient presents to the ED with epigastric pain; one SL 0 4 mg nitro given with relief  · Troponin trend <0 02 then 0 04->1 12; recommend continue to trend   · Likely secondary to hypertensive urgency with admitting /119  · EKG reveals sinus rhythm 79 with no acute ST/T wave abnormalities noted  · Tele strip in ED does note an episode of possible non-sustained fascicular VT  · Patient with possible underlying coronary artery disease secondary to risk factors (early family history of heart disease, untreated HTN, tobacco abuse, obesity); will discuss further inpatient cardiac testing with Cardiology attending     2  Essential HTN (uncontrolled):  · Patient has had multiple ED visits with evidence of HTN but he has not followed up on out-patient treatment  · BP in /119 on admit; nitro paste applied with adequate response  · Will need to institute oral anti-hypertensive medications  · Would consider amlodipine and possible beta blocker as he has risk factors for possible underlying cardiac disease (will discuss with Cardiology attending)    3  CKD stage 3:   · Previously undiagnosed; baseline creat appears to be 1 2-1 4; creat on admit is 1 5  · Likely hypertensive kidney disease  · Would avoid ACE/ARB medications for BP control; continue to trend creat    4   GERD:  · Patient with long-standing history of epigastric pain/burning  · Multiple ED visits for the same; patient takes no home meds and has not follow up for outpatient testing  · Utilizes TUMS at home        History of Present Illness   HPI: Mora Nunez is a 38y o  year old male who has a history of uncontrolled, untreated HTN and GERD  He does not regularly follow with a physician  Patient presents to the ED secondary to epigastric pain (he has had multiple prior ED visits for this diagnosis)  Patient states this pain began early this morning and woke him from sleep, he states this pain is identical to all prior acid reflux symptoms he has had  He does note left upper arm "burning", however the patient tells me he has had this for the past several months and he believes it is secondary to left shoulder rotator cuff tear  He denies any associated nausea/vomiting, chest pain, diaphoresis, shortness of breath, palpitation, lower extremity edema  The patient also notes "ringing in my ears" which he states happens when his blood pressure gets too high  Mr Harpreet Denis tells me that he has had this pain for several years and he notes it is typically brought on by certain foods  He does admit that this pain today, however occurred without food intake  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 and walking frequently throughout the day  He has not been performing this job for several weeks to months, but when he was he states he could do so without any cardiac complaints  Troponin trend <0 02 then 0 04->1 12; EKG reveals sinus rhythm 79 with Q waves noted inferior leads but no acute ST/T wave abnormalities  Patient is asymptomatic at this time, given his multiple risk factors for CAD (early family history of heart disease, uncontrolled hypertension, tobacco abuse, obesity), will discuss further inpatient cardiac testing with Cardiology attending             EKG reviewed personally: EKG reveals sinus rhythm 79 with no acute ST/T wave abnormalities        Telemetry reviewed personally: sinus rhythm with rate in the         Review of Systems Constitution: Negative for malaise/fatigue and weight gain  Cardiovascular: Negative for chest pain, dyspnea on exertion, leg swelling, orthopnea and palpitations  Respiratory: Negative for shortness of breath  Sleep disturbances due to breathing: no SOB at rest     Gastrointestinal: Negative for nausea and vomiting  Neurological: Negative for dizziness and light-headedness  Historical Information   Past Medical History:   Diagnosis Date    Chronic pain     GERD (gastroesophageal reflux disease)     Hypertension      Past Surgical History:   Procedure Laterality Date    NO PAST SURGERIES       Social History     Substance and Sexual Activity   Alcohol Use Yes    Comment: social     Social History     Substance and Sexual Activity   Drug Use Never     Social History     Tobacco Use   Smoking Status Current Every Day Smoker    Packs/day: 1 00   Smokeless Tobacco Never Used     Family History: History reviewed  No pertinent family history  Meds/Allergies   current meds:   Current Facility-Administered Medications   Medication Dose Route Frequency    acetaminophen (TYLENOL) tablet 650 mg  650 mg Oral Q6H PRN    enoxaparin (LOVENOX) subcutaneous injection 40 mg  40 mg Subcutaneous Daily    nicotine (NICODERM CQ) 21 mg/24 hr TD 24 hr patch 1 patch  1 patch Transdermal Daily    ondansetron (ZOFRAN) injection 4 mg  4 mg Intravenous Q6H PRN     No current facility-administered medications for this encounter  No Known Allergies    Objective   Vitals: Blood pressure (!) 181/114, pulse 92, temperature 97 7 °F (36 5 °C), temperature source Temporal, resp  rate 19, height 5' 10" (1 778 m), weight 102 kg (225 lb), SpO2 96 %  ,     Body mass index is 32 28 kg/m²  ,     Systolic (37UYA), CRM:455 , Min:155 , IVW:987     Diastolic (50BIN), UCV:164, Min:84, Max:126          No intake or output data in the 24 hours ending 09/01/20 1424  Weight (last 2 days)     Date/Time   Weight    09/01/20 1310   102 (225) Invasive Devices     None                   Physical Exam  Vitals signs reviewed  Cardiovascular:      Rate and Rhythm: Normal rate and regular rhythm  Heart sounds: Normal heart sounds, S1 normal and S2 normal  No murmur  Pulmonary:      Effort: Pulmonary effort is normal       Breath sounds: Normal breath sounds  Musculoskeletal:      Right lower leg: No edema  Left lower leg: No edema  Neurological:      Mental Status: He is alert  Psychiatric:         Mood and Affect: Mood normal            LABORATORY RESULTS:  Results from last 7 days   Lab Units 09/01/20  1316 09/01/20  1034 09/01/20  0736   TROPONIN I ng/mL 1 12* 0 40* <0 02     CBC with diff:   Results from last 7 days   Lab Units 09/01/20  1316 09/01/20  0736   WBC Thousand/uL 15 23* 12 77*   HEMOGLOBIN g/dL 17 2* 17 4*   HEMATOCRIT % 51 1* 52 2*   MCV fL 84 85   PLATELETS Thousands/uL 267 268   MCH pg 28 3 28 2   MCHC g/dL 33 7 33 3   RDW % 13 0 13 0   MPV fL 10 2 10 1   NRBC AUTO /100 WBCs 0 0       CMP:  Results from last 7 days   Lab Units 09/01/20  1316 09/01/20  0736   POTASSIUM mmol/L 4 3 3 9   CHLORIDE mmol/L 102 104   CO2 mmol/L 28 29   BUN mg/dL 14 15   CREATININE mg/dL 1 51* 1 43*   CALCIUM mg/dL 9 8 9 3   AST U/L  --  32   ALT U/L  --  77   ALK PHOS U/L  --  99   EGFR ml/min/1 73sq m 57 60       BMP:  Results from last 7 days   Lab Units 09/01/20  1316 09/01/20  0736   POTASSIUM mmol/L 4 3 3 9   CHLORIDE mmol/L 102 104   CO2 mmol/L 28 29   BUN mg/dL 14 15   CREATININE mg/dL 1 51* 1 43*   CALCIUM mg/dL 9 8 9 3          No results found for: NTBNP                               Lipid Profile:   No results found for: CHOL  No results found for: HDL  No results found for: LDLCALC  No results found for: TRIG      Cardiac testing:   No results found for this or any previous visit  No results found for this or any previous visit  No procedure found  No results found for this or any previous visit        Imaging: I have personally reviewed pertinent reports  Xr Chest 2 Views    Result Date: 9/1/2020  Narrative: CHEST INDICATION:   chest pain  COMPARISON:  None EXAM PERFORMED/VIEWS:  XR CHEST PA & LATERAL FINDINGS: Cardiomediastinal silhouette appears unremarkable  Linear subsegmental atelectasis in the lingula  No focal consolidation, pleural effusion or pneumothorax  Osseous structures appear within normal limits for patient age  Impression: No acute cardiopulmonary disease  Workstation performed: [id number]         Assessment  Active Problems:    * No active hospital problems  *                   Thank you for allowing us to participate in this patient's care  Counseling / Coordination of Care  Total floor / unit time spent today 45 minutes  Greater than 50% of total time was spent with the patient and / or family counseling and / or coordination of care  A description of the counseling / coordination of care: Review of history, current assessment, development of a plan  Code Status: No Order    ** Please Note: Dragon 360 Dictation voice to text software may have been used in the creation of this document   **

## 2020-09-01 NOTE — PLAN OF CARE
Problem: PAIN - ADULT  Goal: Verbalizes/displays adequate comfort level or baseline comfort level  Description: Interventions:  - Encourage patient to monitor pain and request assistance  - Assess pain using appropriate pain scale  - Administer analgesics based on type and severity of pain and evaluate response  - Implement non-pharmacological measures as appropriate and evaluate response  - Consider cultural and social influences on pain and pain management  - Notify physician/advanced practitioner if interventions unsuccessful or patient reports new pain  Outcome: Progressing     Problem: INFECTION - ADULT  Goal: Absence or prevention of progression during hospitalization  Description: INTERVENTIONS:  - Assess and monitor for signs and symptoms of infection  - Monitor lab/diagnostic results  - Monitor all insertion sites, i e  indwelling lines, tubes, and drains  - Monitor endotracheal if appropriate and nasal secretions for changes in amount and color  - [place] appropriate cooling/warming therapies per order  - Administer medications as ordered  - Instruct and encourage patient and family to use good hand hygiene technique  - Identify and instruct in appropriate isolation precautions for identified infection/condition  Outcome: Progressing  Goal: Absence of fever/infection during neutropenic period  Description: INTERVENTIONS:  - Monitor WBC    Outcome: Progressing     Problem: SAFETY ADULT  Goal: Patient will remain free of falls  Description: INTERVENTIONS:  - Assess patient frequently for physical needs  -  Identify cognitive and physical deficits and behaviors that affect risk of falls    -  [place] fall precautions as indicated by assessment   - Educate patient/family on patient safety including physical limitations  - Instruct patient to call for assistance with activity based on assessment  - Modify environment to reduce risk of injury  - Consider OT/PT consult to assist with strengthening/mobility  Outcome: Progressing  Goal: Maintain or return to baseline ADL function  Description: INTERVENTIONS:  -  Assess patient's ability to carry out ADLs; assess patient's baseline for ADL function and identify physical deficits which impact ability to perform ADLs (bathing, care of mouth/teeth, toileting, grooming, dressing, etc )  - Assess/evaluate cause of self-care deficits   - Assess range of motion  - Assess patient's mobility; develop plan if impaired  - Assess patient's need for assistive devices and provide as appropriate  - Encourage maximum independence but intervene and supervise when necessary  - Involve family in performance of ADLs  - Assess for home care needs following discharge   - Consider OT consult to assist with ADL evaluation and planning for discharge  - Provide patient education as appropriate  Outcome: Progressing  Goal: Maintain or return mobility status to optimal level  Description: INTERVENTIONS:  - Assess patient's baseline mobility status (ambulation, transfers, stairs, etc )    - Identify cognitive and physical deficits and behaviors that affect mobility  - Identify mobility aids required to assist with transfers and/or ambulation (gait belt, sit-to-stand, lift, walker, cane, etc )  - [place] fall precautions as indicated by assessment  - Record patient progress and toleration of activity level on Mobility SBAR; progress patient to next Phase/Stage  - Instruct patient to call for assistance with activity based on assessment  - Consider rehabilitation consult to assist with strengthening/weightbearing, etc   Outcome: Progressing     Problem: DISCHARGE PLANNING  Goal: Discharge to home or other facility with appropriate resources  Description: INTERVENTIONS:  - Identify barriers to discharge w/patient and caregiver  - Arrange for needed discharge resources and transportation as appropriate  - Identify discharge learning needs (meds, wound care, etc )  - Arrange for interpretive services to assist at discharge as needed  - Refer to Case Management Department for coordinating discharge planning if the patient needs post-hospital services based on physician/advanced practitioner order or complex needs related to functional status, cognitive ability, or social support system  Outcome: Progressing     Problem: Knowledge Deficit  Goal: Patient/family/caregiver demonstrates understanding of disease process, treatment plan, medications, and discharge instructions  Description: Complete learning assessment and assess knowledge base    Interventions:  - Provide teaching at level of understanding  - Provide teaching via preferred learning methods  Outcome: Progressing

## 2020-09-01 NOTE — ED PROVIDER NOTES
History  Chief Complaint   Patient presents with    Chest Pain     patient presents to the ED to be admitted per the advisement of his last ED visit one hour ago  patient left AMA and has returned for admission      HPI     54-year-old male presents for chest pain  Patient was here earlier this morning approximately 6 hours ago and was evaluated for ACS and was found to have a elevated troponin  The patient reports chest pain that started 0600 hours this morning  Felt similar to previous episodes of reflux however worse  Also had tinnitus in his ears that is not uncommon for him  Especially when his blood pressure is elevated  He was advised to stay in the hospital for cardiac evaluation and possible catheterization although he signed out against medical advice as he wanted to go smoke a cigarette and take care of his children and preparation for hospital admission  Nothing has changed since his discharge  He has no pain right now other than his shoulder that he states is from a torn rotator cuff and always hurts him  Denies any shortness of breath back pain nausea vomiting diarrhea or diaphoresis  Symptoms were severe on onset  Better now  Relieved with nitroglycerin while in the hospital earlier  Did not take any aspirin    On exam he appears well  He is hypertensive and tachycardic  Note diaphoretic  Abdomen is soft nontender  Lungs are clear no murmurs  No bruits  Assessment plan:  Chest pain recently elevated troponin  Will recheck a troponin recheck ECG, discussed with Cardiology for admission at this hospital or transfer to [place] for possible cardiac catheterization    None       Past Medical History:   Diagnosis Date    Chronic pain     GERD (gastroesophageal reflux disease)     Hypertension        Past Surgical History:   Procedure Laterality Date    NO PAST SURGERIES         History reviewed  No pertinent family history    I have reviewed and agree with the history as documented  E-Cigarette/Vaping    E-Cigarette Use Never User      E-Cigarette/Vaping Substances    Nicotine No     Flavoring No      Social History     Tobacco Use    Smoking status: Current Every Day Smoker     Packs/day: 1 00    Smokeless tobacco: Never Used   Substance Use Topics    Alcohol use: Yes     Comment: social    Drug use: Never       Review of Systems   Constitutional: Negative for chills, fatigue and fever  Eyes: Negative for photophobia and visual disturbance  Respiratory: Negative for cough and shortness of breath  Cardiovascular: Negative for chest pain, palpitations and leg swelling  Gastrointestinal: Negative for diarrhea, nausea and vomiting  Endocrine: Negative for polydipsia and polyuria  Genitourinary: Negative for decreased urine volume, difficulty urinating, dysuria and frequency  Musculoskeletal: Negative for back pain, neck pain and neck stiffness  Skin: Negative for color change and rash  Allergic/Immunologic: Negative for environmental allergies and immunocompromised state  Neurological: Negative for dizziness and headaches  Hematological: Negative for adenopathy  Does not bruise/bleed easily  Psychiatric/Behavioral: Negative for dysphoric mood  The patient is not nervous/anxious  Physical Exam  Physical Exam  Vitals signs and nursing note reviewed  Constitutional:       Appearance: He is well-developed  HENT:      Head: Normocephalic and atraumatic  Right Ear: External ear normal       Left Ear: External ear normal    Eyes:      Conjunctiva/sclera: Conjunctivae normal       Pupils: Pupils are equal, round, and reactive to light  Neck:      Musculoskeletal: Normal range of motion and neck supple  Thyroid: No thyromegaly  Vascular: No JVD  Cardiovascular:      Rate and Rhythm: Normal rate and regular rhythm  Heart sounds: Normal heart sounds  No murmur  No friction rub  No gallop      Pulmonary:      Effort: Pulmonary effort is normal  No respiratory distress  Breath sounds: Normal breath sounds  No wheezing or rales  Abdominal:      General: Bowel sounds are normal  There is no distension  Palpations: Abdomen is soft  Tenderness: There is no guarding or rebound  Musculoskeletal: Normal range of motion  Lymphadenopathy:      Cervical: No cervical adenopathy  Skin:     General: Skin is warm  Neurological:      Mental Status: He is alert and oriented to person, place, and time  Cranial Nerves: No cranial nerve deficit     Psychiatric:         Behavior: Behavior normal          Vital Signs  ED Triage Vitals [09/01/20 1310]   Temperature Pulse Respirations Blood Pressure SpO2   97 7 °F (36 5 °C) (!) 110 20 (!) 203/119 97 %      Temp Source Heart Rate Source Patient Position - Orthostatic VS BP Location FiO2 (%)   Temporal Monitor Sitting Right arm --      Pain Score       No Pain           Vitals:    09/01/20 1310 09/01/20 1315 09/01/20 1330 09/01/20 1400   BP: (!) 203/119 (!) 203/119 (!) 181/114 153/89   Pulse: (!) 110 99 92 81   Patient Position - Orthostatic VS: Sitting            Visual Acuity      ED Medications  Medications   Labetalol HCl (NORMODYNE) injection 10 mg (has no administration in time range)   nitroglycerin (NITRO-BID) 2 % TD ointment 1 inch (1 inch Topical Given 9/1/20 1322)   aspirin chewable tablet 324 mg (324 mg Oral Given 9/1/20 1327)       Diagnostic Studies  Results Reviewed     Procedure Component Value Units Date/Time    Troponin I [[id number]]  (Abnormal) Collected:  09/01/20 1316    Lab Status:  Final result Specimen:  Blood from Arm, Right Updated:  09/01/20 1345     Troponin I 1 12 ng/mL     Basic metabolic panel [[id number]]  (Abnormal) Collected:  09/01/20 1316    Lab Status:  Final result Specimen:  Blood from Arm, Right Updated:  09/01/20 1336     Sodium 137 mmol/L      Potassium 4 3 mmol/L      Chloride 102 mmol/L      CO2 28 mmol/L      ANION GAP 7 mmol/L      BUN 14 mg/dL      Creatinine 1 51 mg/dL      Glucose 109 mg/dL      Calcium 9 8 mg/dL      eGFR 57 ml/min/1 73sq m     Narrative:       [place] guidelines for Chronic Kidney Disease (CKD):     Stage 1 with normal or high GFR (GFR > 90 mL/min/1 73 square meters)    Stage 2 Mild CKD (GFR = 60-89 mL/min/1 73 square meters)    Stage 3A Moderate CKD (GFR = 45-59 mL/min/1 73 square meters)    Stage 3B Moderate CKD (GFR = 30-44 mL/min/1 73 square meters)    Stage 4 Severe CKD (GFR = 15-29 mL/min/1 73 square meters)    Stage 5 End Stage CKD (GFR <15 mL/min/1 73 square meters)  Note: GFR calculation is accurate only with a steady state creatinine    CBC and differential [[id number]]  (Abnormal) Collected:  09/01/20 1316    Lab Status:  Final result Specimen:  Blood from Arm, Right Updated:  09/01/20 1323     WBC 15 23 Thousand/uL      RBC 6 08 Million/uL      Hemoglobin 17 2 g/dL      Hematocrit 51 1 %      MCV 84 fL      MCH 28 3 pg      MCHC 33 7 g/dL      RDW 13 0 %      MPV 10 2 fL      Platelets 058 Thousands/uL      nRBC 0 /100 WBCs      Neutrophils Relative 69 %      Immat GRANS % 1 %      Lymphocytes Relative 20 %      Monocytes Relative 7 %      Eosinophils Relative 2 %      Basophils Relative 1 %      Neutrophils Absolute 10 68 Thousands/µL      Immature Grans Absolute 0 07 Thousand/uL      Lymphocytes Absolute 3 06 Thousands/µL      Monocytes Absolute 1 09 Thousand/µL      Eosinophils Absolute 0 24 Thousand/µL      Basophils Absolute 0 09 Thousands/µL                  No orders to display              Procedures  CriticalCare Time  Performed by: Flakita Hogue DO  Authorized by: Flakita Hogue DO     Critical care provider statement:     Critical care time (minutes):  40    Critical care time was exclusive of:  Separately billable procedures and treating other patients and teaching time    Critical care was necessary to treat or prevent imminent or life-threatening deterioration of the following conditions:  Cardiac failure    Critical care was time spent personally by me on the following activities:  Blood draw for specimens, obtaining history from patient or surrogate, re-evaluation of patient's condition, review of old charts, evaluation of patient's response to treatment, examination of patient, ordering and review of laboratory studies, ordering and performing treatments and interventions and development of treatment plan with patient or surrogate  Comments:      Hypertensive emergency             ED Course  ED Course as of Sep 01 1444   Tue Sep 01, 2020   1338 [place] texted dr Abbi Dailey      [phone] Patient's blood pressure improved to 181/114 after nitroglycerin paste  Heart rate still upper 90s  Patient resting comfortably      1407 NP from cardiology is coming down to see the patient  1411 EKG read by me with a rate of 108, sinus rhythm, Q-wave inferior all leads no ST depression or ST elevation, normal intervals normal axis      1416 Determining whether or not the patient can stay here or needs [facility] for possible cath      1440 Patient's repeat pressure 165/111, labetalol IV ordered 10 mg          US AUDIT      Most Recent Value   Initial Alcohol Screen: US AUDIT-C    1  How often do you have a drink containing alcohol?  0 Filed at: 09/01/2020 1311   2  How many drinks containing alcohol do you have on a typical day you are drinking? 0 Filed at: 09/01/2020 1311   3a  Male UNDER 65: How often do you have five or more drinks on one occasion? 0 Filed at: 09/01/2020 1311   3b  FEMALE Any Age, or MALE 65+: How often do you have 4 or more drinks on one occassion? 0 Filed at: 09/01/2020 1311   Audit-C Score  0 Filed at: 09/01/2020 1311                  DIONICIO/DAST-10      Most Recent Value   How many times in the past year have you    Used an illegal drug or used a prescription medication for non-medical reasons?   Never Filed at: 09/01/2020 1311 MDM  Number of Diagnoses or Management Options  Acute kidney injury [facility]): new and requires workup  Hypertensive emergency: new and requires workup  NSTEMI (non-ST elevated myocardial infarction) [facility]): new and requires workup  Diagnosis management comments: Elevated troponin  -likely secondary to hypertensive emergency, EKG is not significant for acute ST changes discussed with cardiology will keep at this hospital and perform further workup   Discussed with doctor everett  Acute kidney injury likely secondary to elevated blood pressure  Hypertensive emergency as above-nitroglycerin paste ordered with improvement and then elevated blood pressure again given labetalol 10 mg IV       Amount and/or Complexity of Data Reviewed  Clinical lab tests: reviewed and ordered  Tests in the medicine section of CPT®: ordered and reviewed  Decide to obtain previous medical records or to obtain history from someone other than the patient: yes  Discuss the patient with other providers: yes  Independent visualization of images, tracings, or specimens: yes    Patient Progress  Patient progress: stable        Disposition  Final diagnoses:   Hypertensive emergency   NSTEMI (non-ST elevated myocardial infarction) ([facility] )   Acute kidney injury ([place] [address] )     Time reflects when diagnosis was documented in both MDM as applicable and the Disposition within this note     Time User Action Codes Description Comment    9/1/2020  1:41 PM Odilia Samm F Add [I16 1] Hypertensive emergency     9/1/2020  2:11 PM Odilia Samm F Add [I21 4] NSTEMI (non-ST elevated myocardial infarction) ([facility] )     9/1/2020  2:11 PM Odilia Samm F Add [N17 0] Acute kidney injury (JAM) with acute tubular necrosis (ATN) ([facility] )     9/1/2020  2:11 PM Odilia Samm F Remove [N17 0] Acute kidney injury (JAM) with acute tubular necrosis (ATN) ([facility] )     9/1/2020  2:11 PM Odilia Samm F Add [N17 9] Acute kidney injury [facility])       ED Disposition     ED Disposition Condition Date/Time Comment    Admit Stable Tue Sep 1, 2020  2:25 PM Case was discussed with denise and the patient's admission status was agreed to be Admission Status: inpatient status to the service of Dr Mery Whitfield   Follow-up Information    None         Patient's Medications   Discharge Prescriptions    No medications on file     No discharge procedures on file      PDMP Review       Value Time User    PDMP Reviewed  Yes 10/25/2019  7:38 PM Samanta Lara PA-C          ED Provider  Electronically Signed by           Gomez Lazaro DO  09/01/20 1446

## 2020-09-01 NOTE — ED NOTES
Pt refusing further treatment and wants to go outside and smoke  Informed of hospital policies in regards to no smoking and leaving the facility is not allowed with out signing AMA  Informed about having to restart process if he leaves  Pt non compliant and wants to leave  IV removed at this time        Que Lauren, RN  09/01/20 7178

## 2020-09-01 NOTE — RESPIRATORY THERAPY NOTE
RT Protocol Note  Amy Mater 38 y o  male MRN: [id number]  Unit/Bed#: -01 Encounter: [id number]    Assessment    Principal Problem:    Type 2 MI (myocardial infarction) [facility])  Active Problems:    Hypertensive urgency    GERD (gastroesophageal reflux disease)      Home Pulmonary Medications:  none     09/01/20 1542   Respiratory Protocol   Protocol Initiated? Yes   Protocol Selection Respiratory   Language Barrier? No   Medical & Social History Reviewed? Yes   Diagnostic Studies Reviewed? Yes   Physical Assessment Performed? Yes   Respiratory Plan Discontinue Protocol   Respiratory Assessment   Assessment Type Assess only   General Appearance Awake; Alert   Respiratory Pattern Normal   Chest Assessment Chest expansion symmetrical   Bilateral Breath Sounds Clear   Cough None   Resp Comments plan to DC protocol/BBS clear/CXR clear/no pulm   hx/takes no resp  meds at home/no c/o SOB   O2 Device RA          Past Medical History:   Diagnosis Date    Chronic pain     GERD (gastroesophageal reflux disease)     Hypertension      Social History     Socioeconomic History    Marital status: Single     Spouse name: None    Number of children: None    Years of education: None    Highest education level: None   Occupational History    None   Social Needs    Financial resource strain: None    Food insecurity     Worry: None     Inability: None    Transportation needs     Medical: None     Non-medical: None   Tobacco Use    Smoking status: Current Every Day Smoker     Packs/day: 1 00    Smokeless tobacco: Never Used   Substance and Sexual Activity    Alcohol use: Yes     Comment: social    Drug use: Never    Sexual activity: None   Lifestyle    Physical activity     Days per week: None     Minutes per session: None    Stress: None   Relationships    Social connections     Talks on phone: None     Gets together: None     Attends [employer] service: None     Active member of club or organization: None Attends meetings of clubs or organizations: None     Relationship status: None    Intimate partner violence     Fear of current or ex partner: None     Emotionally abused: None     Physically abused: None     Forced sexual activity: None   Other Topics Concern    None   Social History Narrative    None       Subjective         Objective    Physical Exam:   Assessment Type: Assess only  General Appearance: Awake, Alert  Respiratory Pattern: Normal  Chest Assessment: Chest expansion symmetrical  Bilateral Breath Sounds: Clear  Cough: None  O2 Device: RA    Vitals:  Blood pressure (!) 179/123, pulse 70, temperature 98 9 °F (37 2 °C), resp  rate 19, height 5' 10" (1 778 m), weight 102 kg (225 lb), SpO2 97 %  Imaging and other studies: I have personally reviewed pertinent films in PACS    O2 Device: RA     Plan    Respiratory Plan: Discontinue Protocol        Resp Comments: plan to DC protocol/BBS clear/CXR clear/no pulm   hx/takes no resp  meds at home/no c/o SOB

## 2020-09-01 NOTE — ASSESSMENT & PLAN NOTE
Patient presented with creatinine of 1 51  Baseline is from 1 27-1 39  Avoid hypotension/nephrotoxins medication  Urinary retention protocol  Monitor renal function in a m

## 2020-09-02 ENCOUNTER — APPOINTMENT (INPATIENT)
Dept: NON INVASIVE DIAGNOSTICS | Facility: HOSPITAL | Age: 40
DRG: 199 | End: 2020-09-02
Payer: COMMERCIAL

## 2020-09-02 ENCOUNTER — HOSPITAL ENCOUNTER (INPATIENT)
Facility: HOSPITAL | Age: 40
LOS: 2 days | Discharge: HOME | DRG: 190 | End: 2020-09-04
Attending: INTERNAL MEDICINE | Admitting: INTERNAL MEDICINE
Payer: COMMERCIAL

## 2020-09-02 VITALS
HEART RATE: 63 BPM | TEMPERATURE: 97.9 F | SYSTOLIC BLOOD PRESSURE: 139 MMHG | HEIGHT: 70 IN | OXYGEN SATURATION: 98 % | RESPIRATION RATE: 19 BRPM | DIASTOLIC BLOOD PRESSURE: 64 MMHG | BODY MASS INDEX: 32.21 KG/M2 | WEIGHT: 225 LBS

## 2020-09-02 DIAGNOSIS — I16.0 HYPERTENSIVE URGENCY: ICD-10-CM

## 2020-09-02 DIAGNOSIS — E66.9 OBESE: ICD-10-CM

## 2020-09-02 DIAGNOSIS — I21.4 TYPE 1 NON-ST ELEVATION MYOCARDIAL INFARCTION (NSTEMI) (HCC): ICD-10-CM

## 2020-09-02 DIAGNOSIS — E78.5 HLD (HYPERLIPIDEMIA): ICD-10-CM

## 2020-09-02 DIAGNOSIS — I21.A1 TYPE 2 MI (MYOCARDIAL INFARCTION) (HCC): Primary | ICD-10-CM

## 2020-09-02 DIAGNOSIS — K21.9 GASTROESOPHAGEAL REFLUX DISEASE WITHOUT ESOPHAGITIS: ICD-10-CM

## 2020-09-02 LAB
ALBUMIN SERPL BCP-MCNC: 3.6 G/DL (ref 3.5–5)
ALP SERPL-CCNC: 86 U/L (ref 46–116)
ALT SERPL W P-5'-P-CCNC: 76 U/L (ref 12–78)
ANION GAP SERPL CALCULATED.3IONS-SCNC: 7 MMOL/L (ref 4–13)
ANION GAP SERPL CALCULATED.3IONS-SCNC: 9 MMOL/L (ref 4–13)
APTT PPP: 47 SECONDS (ref 23–37)
APTT PPP: 49 SECONDS (ref 23–37)
APTT PPP: 58 SECONDS (ref 23–37)
APTT PPP: 70 SECONDS (ref 23–37)
AST SERPL W P-5'-P-CCNC: 55 U/L (ref 5–45)
BASOPHILS # BLD AUTO: 0.07 THOUSANDS/ΜL (ref 0–0.1)
BASOPHILS # BLD AUTO: 0.08 THOUSANDS/ΜL (ref 0–0.1)
BASOPHILS NFR BLD AUTO: 1 % (ref 0–1)
BASOPHILS NFR BLD AUTO: 1 % (ref 0–1)
BILIRUB SERPL-MCNC: 0.6 MG/DL (ref 0.2–1)
BUN SERPL-MCNC: 17 MG/DL (ref 5–25)
BUN SERPL-MCNC: 18 MG/DL (ref 5–25)
CALCIUM SERPL-MCNC: 8.5 MG/DL (ref 8.3–10.1)
CALCIUM SERPL-MCNC: 9.2 MG/DL (ref 8.3–10.1)
CHLORIDE SERPL-SCNC: 107 MMOL/L (ref 100–108)
CHLORIDE SERPL-SCNC: 109 MMOL/L (ref 100–108)
CHOLEST SERPL-MCNC: 196 MG/DL (ref 50–200)
CO2 SERPL-SCNC: 22 MMOL/L (ref 21–32)
CO2 SERPL-SCNC: 30 MMOL/L (ref 21–32)
CREAT SERPL-MCNC: 1.29 MG/DL (ref 0.6–1.3)
CREAT SERPL-MCNC: 1.38 MG/DL (ref 0.6–1.3)
EOSINOPHIL # BLD AUTO: 0.21 THOUSAND/ΜL (ref 0–0.61)
EOSINOPHIL # BLD AUTO: 0.3 THOUSAND/ΜL (ref 0–0.61)
EOSINOPHIL NFR BLD AUTO: 2 % (ref 0–6)
EOSINOPHIL NFR BLD AUTO: 3 % (ref 0–6)
ERYTHROCYTE [DISTWIDTH] IN BLOOD BY AUTOMATED COUNT: 13.1 % (ref 11.6–15.1)
ERYTHROCYTE [DISTWIDTH] IN BLOOD BY AUTOMATED COUNT: 13.8 % (ref 11.6–15.1)
EST. AVERAGE GLUCOSE BLD GHB EST-MCNC: 108 MG/DL
GFR SERPL CREATININE-BSD FRML MDRD: 63 ML/MIN/1.73SQ M
GFR SERPL CREATININE-BSD FRML MDRD: 68 ML/MIN/1.73SQ M
GLUCOSE SERPL-MCNC: 90 MG/DL (ref 65–140)
GLUCOSE SERPL-MCNC: 95 MG/DL (ref 65–140)
HBA1C MFR BLD: 5.4 %
HCT VFR BLD AUTO: 51 % (ref 36.5–49.3)
HCT VFR BLD AUTO: 53.5 % (ref 36.5–49.3)
HDLC SERPL-MCNC: 24 MG/DL
HGB BLD-MCNC: 16.7 G/DL (ref 12–17)
HGB BLD-MCNC: 17.7 G/DL (ref 12–17)
IMM GRANULOCYTES # BLD AUTO: 0.06 THOUSAND/UL (ref 0–0.2)
IMM GRANULOCYTES # BLD AUTO: 0.07 THOUSAND/UL (ref 0–0.2)
IMM GRANULOCYTES NFR BLD AUTO: 1 % (ref 0–2)
IMM GRANULOCYTES NFR BLD AUTO: 1 % (ref 0–2)
INR PPP: 0.94 (ref 0.84–1.19)
LDLC SERPL CALC-MCNC: 128 MG/DL (ref 0–100)
LIPASE SERPL-CCNC: 180 U/L (ref 73–393)
LYMPHOCYTES # BLD AUTO: 2.72 THOUSANDS/ΜL (ref 0.6–4.47)
LYMPHOCYTES # BLD AUTO: 4.41 THOUSANDS/ΜL (ref 0.6–4.47)
LYMPHOCYTES NFR BLD AUTO: 22 % (ref 14–44)
LYMPHOCYTES NFR BLD AUTO: 38 % (ref 14–44)
MAGNESIUM SERPL-MCNC: 1.8 MG/DL (ref 1.6–2.6)
MAGNESIUM SERPL-MCNC: 2.3 MG/DL (ref 1.6–2.6)
MCH RBC QN AUTO: 27.8 PG (ref 26.8–34.3)
MCH RBC QN AUTO: 28 PG (ref 26.8–34.3)
MCHC RBC AUTO-ENTMCNC: 32.7 G/DL (ref 31.4–37.4)
MCHC RBC AUTO-ENTMCNC: 33.1 G/DL (ref 31.4–37.4)
MCV RBC AUTO: 84 FL (ref 82–98)
MCV RBC AUTO: 86 FL (ref 82–98)
MONOCYTES # BLD AUTO: 0.89 THOUSAND/ΜL (ref 0.17–1.22)
MONOCYTES # BLD AUTO: 0.98 THOUSAND/ΜL (ref 0.17–1.22)
MONOCYTES NFR BLD AUTO: 7 % (ref 4–12)
MONOCYTES NFR BLD AUTO: 9 % (ref 4–12)
NEUTROPHILS # BLD AUTO: 5.71 THOUSANDS/ΜL (ref 1.85–7.62)
NEUTROPHILS # BLD AUTO: 8.17 THOUSANDS/ΜL (ref 1.85–7.62)
NEUTS SEG NFR BLD AUTO: 48 % (ref 43–75)
NEUTS SEG NFR BLD AUTO: 67 % (ref 43–75)
NONHDLC SERPL-MCNC: 172 MG/DL
NRBC BLD AUTO-RTO: 0 /100 WBCS
NRBC BLD AUTO-RTO: 0 /100 WBCS
PHOSPHATE SERPL-MCNC: 4.7 MG/DL (ref 2.7–4.5)
PLATELET # BLD AUTO: 245 THOUSANDS/UL (ref 149–390)
PLATELET # BLD AUTO: 265 THOUSANDS/UL (ref 149–390)
PMV BLD AUTO: 10.2 FL (ref 8.9–12.7)
PMV BLD AUTO: 10.4 FL (ref 8.9–12.7)
POTASSIUM SERPL-SCNC: 3.8 MMOL/L (ref 3.5–5.3)
POTASSIUM SERPL-SCNC: 4 MMOL/L (ref 3.5–5.3)
PROT SERPL-MCNC: 6.8 G/DL (ref 6.4–8.2)
PROTHROMBIN TIME: 12.6 SECONDS (ref 11.6–14.5)
RBC # BLD AUTO: 5.96 MILLION/UL (ref 3.88–5.62)
RBC # BLD AUTO: 6.37 MILLION/UL (ref 3.88–5.62)
SODIUM SERPL-SCNC: 140 MMOL/L (ref 136–145)
SODIUM SERPL-SCNC: 144 MMOL/L (ref 136–145)
TRIGL SERPL-MCNC: 222 MG/DL
TROPONIN I SERPL-MCNC: 10.31 NG/ML
TROPONIN I SERPL-MCNC: 3.73 NG/ML
TROPONIN I SERPL-MCNC: 3.99 NG/ML
TROPONIN I SERPL-MCNC: 8.46 NG/ML
WBC # BLD AUTO: 11.53 THOUSAND/UL (ref 4.31–10.16)
WBC # BLD AUTO: 12.14 THOUSAND/UL (ref 4.31–10.16)

## 2020-09-02 PROCEDURE — 85025 COMPLETE CBC W/AUTO DIFF WBC: CPT | Performed by: INTERNAL MEDICINE

## 2020-09-02 PROCEDURE — 83690 ASSAY OF LIPASE: CPT | Performed by: INTERNAL MEDICINE

## 2020-09-02 PROCEDURE — 85730 THROMBOPLASTIN TIME PARTIAL: CPT | Performed by: INTERNAL MEDICINE

## 2020-09-02 PROCEDURE — 80048 BASIC METABOLIC PNL TOTAL CA: CPT | Performed by: NURSE PRACTITIONER

## 2020-09-02 PROCEDURE — 84484 ASSAY OF TROPONIN QUANT: CPT | Performed by: INTERNAL MEDICINE

## 2020-09-02 PROCEDURE — 85610 PROTHROMBIN TIME: CPT | Performed by: INTERNAL MEDICINE

## 2020-09-02 PROCEDURE — NC001 PR NO CHARGE: Performed by: INTERNAL MEDICINE

## 2020-09-02 PROCEDURE — 93005 ELECTROCARDIOGRAM TRACING: CPT

## 2020-09-02 PROCEDURE — 99223 1ST HOSP IP/OBS HIGH 75: CPT | Performed by: INTERNAL MEDICINE

## 2020-09-02 PROCEDURE — 99239 HOSP IP/OBS DSCHRG MGMT >30: CPT | Performed by: INTERNAL MEDICINE

## 2020-09-02 PROCEDURE — 93306 TTE W/DOPPLER COMPLETE: CPT

## 2020-09-02 PROCEDURE — 80053 COMPREHEN METABOLIC PANEL: CPT | Performed by: INTERNAL MEDICINE

## 2020-09-02 PROCEDURE — 83735 ASSAY OF MAGNESIUM: CPT | Performed by: NURSE PRACTITIONER

## 2020-09-02 PROCEDURE — 84100 ASSAY OF PHOSPHORUS: CPT | Performed by: INTERNAL MEDICINE

## 2020-09-02 PROCEDURE — 99232 SBSQ HOSP IP/OBS MODERATE 35: CPT | Performed by: NURSE PRACTITIONER

## 2020-09-02 PROCEDURE — 93306 TTE W/DOPPLER COMPLETE: CPT | Performed by: INTERNAL MEDICINE

## 2020-09-02 PROCEDURE — 83036 HEMOGLOBIN GLYCOSYLATED A1C: CPT | Performed by: INTERNAL MEDICINE

## 2020-09-02 PROCEDURE — 85025 COMPLETE CBC W/AUTO DIFF WBC: CPT | Performed by: NURSE PRACTITIONER

## 2020-09-02 PROCEDURE — 83735 ASSAY OF MAGNESIUM: CPT | Performed by: INTERNAL MEDICINE

## 2020-09-02 PROCEDURE — 80061 LIPID PANEL: CPT | Performed by: INTERNAL MEDICINE

## 2020-09-02 RX ORDER — NITROGLYCERIN 0.4 MG/1
0.4 TABLET SUBLINGUAL
Status: DISCONTINUED | OUTPATIENT
Start: 2020-09-02 | End: 2020-09-04 | Stop reason: HOSPADM

## 2020-09-02 RX ORDER — ASPIRIN 81 MG/1
81 TABLET ORAL DAILY
Status: DISCONTINUED | OUTPATIENT
Start: 2020-09-03 | End: 2020-09-04 | Stop reason: HOSPADM

## 2020-09-02 RX ORDER — NICOTINE 21 MG/24HR
1 PATCH, TRANSDERMAL 24 HOURS TRANSDERMAL DAILY
Status: DISCONTINUED | OUTPATIENT
Start: 2020-09-03 | End: 2020-09-04 | Stop reason: HOSPADM

## 2020-09-02 RX ORDER — HEPARIN SODIUM 1000 [USP'U]/ML
4000 INJECTION, SOLUTION INTRAVENOUS; SUBCUTANEOUS
Status: CANCELLED | OUTPATIENT
Start: 2020-09-02

## 2020-09-02 RX ORDER — MAGNESIUM HYDROXIDE/ALUMINUM HYDROXICE/SIMETHICONE 120; 1200; 1200 MG/30ML; MG/30ML; MG/30ML
30 SUSPENSION ORAL ONCE
Status: COMPLETED | OUTPATIENT
Start: 2020-09-02 | End: 2020-09-02

## 2020-09-02 RX ORDER — NITROGLYCERIN 0.4 MG/1
0.4 TABLET SUBLINGUAL
Status: CANCELLED | OUTPATIENT
Start: 2020-09-02

## 2020-09-02 RX ORDER — ATORVASTATIN CALCIUM 40 MG/1
40 TABLET, FILM COATED ORAL
Status: CANCELLED | OUTPATIENT
Start: 2020-09-02

## 2020-09-02 RX ORDER — PANTOPRAZOLE SODIUM 40 MG/1
40 TABLET, DELAYED RELEASE ORAL
Status: DISCONTINUED | OUTPATIENT
Start: 2020-09-02 | End: 2020-09-02 | Stop reason: HOSPADM

## 2020-09-02 RX ORDER — HYDRALAZINE HYDROCHLORIDE 25 MG/1
50 TABLET, FILM COATED ORAL EVERY 8 HOURS SCHEDULED
Status: DISCONTINUED | OUTPATIENT
Start: 2020-09-02 | End: 2020-09-02

## 2020-09-02 RX ORDER — PANTOPRAZOLE SODIUM 40 MG/1
40 TABLET, DELAYED RELEASE ORAL
Status: DISCONTINUED | OUTPATIENT
Start: 2020-09-03 | End: 2020-09-04 | Stop reason: HOSPADM

## 2020-09-02 RX ORDER — ONDANSETRON 2 MG/ML
4 INJECTION INTRAMUSCULAR; INTRAVENOUS EVERY 6 HOURS PRN
Status: DISCONTINUED | OUTPATIENT
Start: 2020-09-02 | End: 2020-09-04 | Stop reason: HOSPADM

## 2020-09-02 RX ORDER — HEPARIN SODIUM 10000 [USP'U]/100ML
3-20 INJECTION, SOLUTION INTRAVENOUS
Status: DISCONTINUED | OUTPATIENT
Start: 2020-09-02 | End: 2020-09-03

## 2020-09-02 RX ORDER — HEPARIN SODIUM 1000 [USP'U]/ML
2000 INJECTION, SOLUTION INTRAVENOUS; SUBCUTANEOUS
Status: DISCONTINUED | OUTPATIENT
Start: 2020-09-02 | End: 2020-09-03

## 2020-09-02 RX ORDER — MAGNESIUM HYDROXIDE/ALUMINUM HYDROXICE/SIMETHICONE 120; 1200; 1200 MG/30ML; MG/30ML; MG/30ML
30 SUSPENSION ORAL ONCE
Status: CANCELLED | OUTPATIENT
Start: 2020-09-02

## 2020-09-02 RX ORDER — ACETAMINOPHEN 325 MG/1
650 TABLET ORAL EVERY 6 HOURS PRN
Status: CANCELLED | OUTPATIENT
Start: 2020-09-02

## 2020-09-02 RX ORDER — ONDANSETRON 2 MG/ML
4 INJECTION INTRAMUSCULAR; INTRAVENOUS EVERY 6 HOURS PRN
Status: CANCELLED | OUTPATIENT
Start: 2020-09-02

## 2020-09-02 RX ORDER — MAGNESIUM HYDROXIDE/ALUMINUM HYDROXICE/SIMETHICONE 120; 1200; 1200 MG/30ML; MG/30ML; MG/30ML
30 SUSPENSION ORAL ONCE
Status: DISCONTINUED | OUTPATIENT
Start: 2020-09-02 | End: 2020-09-02

## 2020-09-02 RX ORDER — HEPARIN SODIUM 1000 [USP'U]/ML
2000 INJECTION, SOLUTION INTRAVENOUS; SUBCUTANEOUS
Status: CANCELLED | OUTPATIENT
Start: 2020-09-02

## 2020-09-02 RX ORDER — NICOTINE 21 MG/24HR
1 PATCH, TRANSDERMAL 24 HOURS TRANSDERMAL DAILY
Status: CANCELLED | OUTPATIENT
Start: 2020-09-03

## 2020-09-02 RX ORDER — ASPIRIN 81 MG/1
81 TABLET ORAL DAILY
Status: DISCONTINUED | OUTPATIENT
Start: 2020-09-02 | End: 2020-09-02 | Stop reason: HOSPADM

## 2020-09-02 RX ORDER — PANTOPRAZOLE SODIUM 40 MG/1
40 TABLET, DELAYED RELEASE ORAL
Status: CANCELLED | OUTPATIENT
Start: 2020-09-02

## 2020-09-02 RX ORDER — ATORVASTATIN CALCIUM 40 MG/1
40 TABLET, FILM COATED ORAL
Status: DISCONTINUED | OUTPATIENT
Start: 2020-09-02 | End: 2020-09-04 | Stop reason: HOSPADM

## 2020-09-02 RX ORDER — HYDRALAZINE HYDROCHLORIDE 20 MG/ML
10 INJECTION INTRAMUSCULAR; INTRAVENOUS EVERY 6 HOURS PRN
Status: CANCELLED | OUTPATIENT
Start: 2020-09-02

## 2020-09-02 RX ORDER — HEPARIN SODIUM 10000 [USP'U]/100ML
3-20 INJECTION, SOLUTION INTRAVENOUS
Status: CANCELLED | OUTPATIENT
Start: 2020-09-02

## 2020-09-02 RX ORDER — ACETAMINOPHEN 325 MG/1
650 TABLET ORAL EVERY 6 HOURS PRN
Status: DISCONTINUED | OUTPATIENT
Start: 2020-09-02 | End: 2020-09-04 | Stop reason: HOSPADM

## 2020-09-02 RX ORDER — AMLODIPINE BESYLATE 5 MG/1
5 TABLET ORAL DAILY
Status: CANCELLED | OUTPATIENT
Start: 2020-09-03

## 2020-09-02 RX ORDER — HYDRALAZINE HYDROCHLORIDE 20 MG/ML
10 INJECTION INTRAMUSCULAR; INTRAVENOUS EVERY 6 HOURS PRN
Status: DISCONTINUED | OUTPATIENT
Start: 2020-09-02 | End: 2020-09-04 | Stop reason: HOSPADM

## 2020-09-02 RX ORDER — SODIUM CHLORIDE 9 MG/ML
50 INJECTION, SOLUTION INTRAVENOUS CONTINUOUS
Status: DISCONTINUED | OUTPATIENT
Start: 2020-09-02 | End: 2020-09-03

## 2020-09-02 RX ORDER — ASPIRIN 81 MG/1
81 TABLET ORAL DAILY
Status: CANCELLED | OUTPATIENT
Start: 2020-09-03

## 2020-09-02 RX ORDER — HEPARIN SODIUM 1000 [USP'U]/ML
4000 INJECTION, SOLUTION INTRAVENOUS; SUBCUTANEOUS
Status: DISCONTINUED | OUTPATIENT
Start: 2020-09-02 | End: 2020-09-03

## 2020-09-02 RX ORDER — AMLODIPINE BESYLATE 5 MG/1
5 TABLET ORAL DAILY
Status: DISCONTINUED | OUTPATIENT
Start: 2020-09-03 | End: 2020-09-04 | Stop reason: HOSPADM

## 2020-09-02 RX ADMIN — PANTOPRAZOLE SODIUM 40 MG: 40 TABLET, DELAYED RELEASE ORAL at 12:02

## 2020-09-02 RX ADMIN — ACETAMINOPHEN 650 MG: 325 TABLET ORAL at 07:45

## 2020-09-02 RX ADMIN — HYDRALAZINE HYDROCHLORIDE 10 MG: 20 INJECTION INTRAMUSCULAR; INTRAVENOUS at 12:14

## 2020-09-02 RX ADMIN — ASPIRIN 81 MG: 81 TABLET, COATED ORAL at 08:33

## 2020-09-02 RX ADMIN — TICAGRELOR 180 MG: 90 TABLET ORAL at 08:32

## 2020-09-02 RX ADMIN — SODIUM CHLORIDE 50 ML/HR: 0.9 INJECTION, SOLUTION INTRAVENOUS at 16:04

## 2020-09-02 RX ADMIN — ALUMINUM HYDROXIDE, MAGNESIUM HYDROXIDE, AND SIMETHICONE 30 ML: 200; 200; 20 SUSPENSION ORAL at 12:01

## 2020-09-02 RX ADMIN — HEPARIN SODIUM 15.1 UNITS/KG/HR: 10000 INJECTION, SOLUTION INTRAVENOUS at 20:31

## 2020-09-02 RX ADMIN — METOPROLOL TARTRATE 25 MG: 25 TABLET, FILM COATED ORAL at 20:35

## 2020-09-02 RX ADMIN — NITROGLYCERIN 0.4 MG: 0.4 TABLET SUBLINGUAL at 11:54

## 2020-09-02 RX ADMIN — HEPARIN SODIUM 2000 UNITS: 1000 INJECTION INTRAVENOUS; SUBCUTANEOUS at 23:12

## 2020-09-02 RX ADMIN — HEPARIN SODIUM 2000 UNITS: 1000 INJECTION INTRAVENOUS; SUBCUTANEOUS at 06:45

## 2020-09-02 RX ADMIN — METOPROLOL TARTRATE 25 MG: 25 TABLET, FILM COATED ORAL at 14:17

## 2020-09-02 RX ADMIN — AMLODIPINE BESYLATE 5 MG: 5 TABLET ORAL at 08:32

## 2020-09-02 RX ADMIN — ACETAMINOPHEN 650 MG: 325 TABLET ORAL at 18:55

## 2020-09-02 RX ADMIN — ACETAMINOPHEN 650 MG: 325 TABLET ORAL at 14:16

## 2020-09-02 RX ADMIN — ALUMINUM HYDROXIDE, MAGNESIUM HYDROXIDE, AND SIMETHICONE 30 ML: 200; 200; 20 SUSPENSION ORAL at 16:04

## 2020-09-02 RX ADMIN — TICAGRELOR 90 MG: 90 TABLET ORAL at 20:53

## 2020-09-02 RX ADMIN — HEPARIN SODIUM 2000 UNITS: 1000 INJECTION INTRAVENOUS; SUBCUTANEOUS at 12:54

## 2020-09-02 RX ADMIN — METOPROLOL TARTRATE 25 MG: 25 TABLET, FILM COATED ORAL at 08:33

## 2020-09-02 RX ADMIN — NICOTINE 1 PATCH: 21 PATCH, EXTENDED RELEASE TRANSDERMAL at 11:57

## 2020-09-02 RX ADMIN — ATORVASTATIN CALCIUM 40 MG: 40 TABLET, FILM COATED ORAL at 16:04

## 2020-09-02 RX ADMIN — HYDRALAZINE HYDROCHLORIDE 25 MG: 25 TABLET, FILM COATED ORAL at 05:00

## 2020-09-02 NOTE — ASSESSMENT & PLAN NOTE
Non MI elevated troponin  2D echo reveals EF 55%, moderate hypokinesis basal mid inferior wall  Continue IV heparin  Patient is transferred to [facility] for cardiac catheterization  Continue aspirin Brilinta beta-blocker statin  Cardiology following

## 2020-09-02 NOTE — DISCHARGE SUMMARY
Discharge- Jm Amezquita 1980, 38 y o  male MRN: [id number]    Unit/Bed#: -01 Encounter: [id number]    Primary Care Provider: Corine Metz MD   Date and time admitted to hospital: 9/1/2020  1:06 PM        Chronic kidney disease, stage 3 ([facility] )  Assessment & Plan  Patient presented with creatinine of 1 51  Baseline is from 1 27-1 39  Creatinine this morning is down to 1 38  Avoid hypotension/nephrotoxins medication  Urinary retention protocol  Monitor renal function in a m  Tobacco abuse  Assessment & Plan  Smoking cessation counseling given  On nicotine patch    GERD (gastroesophageal reflux disease)  Assessment & Plan  On Protonix    Hypertensive urgency  Assessment & Plan  Start on Norvasc and started on Lopressor  Monitor vitals as per protocol  Hydralazine p r n  Systolic blood pressure greater than 160    * Type 2 MI (myocardial infarction) [facility])  Assessment & Plan  Non STEMI type 2 In the setting of hypertensive urgency  Monitor on telemetry  Patient troponin peaked at 10 3 overnight  Patient was started on heparin drip after discussion with on-call cardiologist Dr Channing Samuels  Cardiology follow-up noted this morning and as per Dr Juliana Celeste, patient should be transferred to [facility] for cardiac catheterization evaluation  Patient is chest pain-free  Nitro p r n  Patient was given loading dose of Brilinta and was continued on baby aspirin and Lipitor  Patient is also started on Lopressor 25 mg q 6 hours  Continue on heparin drip and follow as per protocol  Discussed with patient need is in agreement with the transfer plan  Discussed with the [facility] physician on call who accepted the patient under their service    Patient is hemodynamically stable for transfer      Hospital Course:     Jm Amezquita is a 38 y o  male patient who originally presented to the hospital on   Admission Orders (From admission, onward)     Ordered        09/01/20 1425  Inpatient Admission (expected length of stay for this patient Order details is greater than two midnights)  Once                  due to chest pain  Patient with history of GERD, tobacco abuse presented to the emergency department with complain of epigastric discomfort, nausea and lightheadedness  Patient states that he woke up this morning around 6:00 a m  and felt reflux like symptoms, has associated nausea, ringing sensation in ears and some burning pain in the left arm  Patient states that he had been in this ER a few months ago with similar reflux like symptoms and his symptoms improved with GI cocktail and was discharged on Nexium  He also admits that he has been told by doctors in the past that his blood pressure is elevated but he has never been on any medications  He states that he had some nausea and ringing sensation in his ears(which is chronic) and associated lightheadedness  He came to the ER  Patient signed out AMA as he had to take care of some stuff at home  He came back again to ER  His initial troponin was negative and repeat was 0 40 and it went up to 1  12  Patient denies any chest pain, shortness of breath, palpitations, vomiting, dizziness, abdominal pain, urinary complaints  Patient is a chronic smoker and smokes 1 pack a day  Patient was given Carafate, Pepcid, nitroglycerin and he signed out AMA  Patient complains of chronic left shoulder pain as he has history of Torn rotator cuff and states that his left shoulder is always painful  Patient was admitted and troponins were trended  Patient was seen by cardiologist who recommended no need to transfer patient and admit to control blood pressure  Cardiologist at recommended stress echo in a m  And to avoid beta-blockers  Overnight patient troponins continue to trend up with peak of 10 3 and on-call cardiologist was contacted and patient was started on heparin drip  Patient remained chest pain-free throughout    This morning cardiologist recommended transferring the patient to [facility] for cardiac catheterization  Patient was started on Lopressor 25 mg q 6 hours, loaded with Brilinta  Patient is in agreement with the transfer plan  Patient remained chest pain-free  Patient is hemodynamically stable for transfer  On exam  Chest-clear to auscultation  Heart-S1-S2 regular  Abdomen-soft, nontender  Neuro-alert awake oriented x3  No focal deficit  Discussed with [facility] physician on call Dr Hao Huertas, accepted the patient under the care  Please see above list of diagnoses and related plan for additional information  Condition at Discharge:  good      Discharge instructions/Information to patient and family:   See after visit summary for information provided to patient and family  Provisions for Follow-Up Care:  See after visit summary for information related to follow-up care and any pertinent home health orders  Disposition:     [address] Transfer to [facility]       Discharge Statement:  I spent 45 minutes discharging the patient  This time was spent on the day of discharge  I had direct contact with the patient on the day of discharge  Greater than 50% of the total time was spent examining patient, answering all patient questions, arranging and discussing plan of care with patient as well as directly providing post-discharge instructions  Additional time then spent on discharge activities  Discharge Medications:  See after visit summary for reconciled discharge medications provided to patient and family        ** Please Note: This note has been constructed using a voice recognition system **

## 2020-09-02 NOTE — PROGRESS NOTES
General Cardiology   Progress Note -  [employer]   Sujit Dennison 38 y o  male MRN: [id number]    Unit/Bed#: -01 Encounter: [id number]    Assessment/ Plan    1  Non-MI elevated troponin:   · Patient presents to the ED yesterday with epigastric pain that he notes was the same quality as his usual GERD pain but was more intense; one SL 0 4 mg nitro given with relief  · Patient notes some mild epigastric pain last evening that continues at this time  · Troponin trend since admission <0 02 to 0 40->1 12->3 33->5 57->10 31->8 46  · Discussed with cardiology attending yesterday and thought to be non-MI elevated troponin secondary to hypertensive urgency with admitting /119  · EKG reveals sinus rhythm 79 with no acute ST/T wave abnormalities noted  · Tele strip in ED does note a wide complex tachy episode per Cardiology attending, possible non-sustained fascicular VT  · Patient with possible underlying coronary artery disease secondary to risk factors (early family history of heart disease, untreated HTN, tobacco abuse, obesity)  · An echo has been ordered and we will attempt to obtain this prior to transfer  · This case was further discuss with Dr Val Barraza this AM and per his orders the patient will be transferred to [facility] for further evaluation/possible cath; patient was ordered aspirin 81 mg, Brilinta 180 mg and a heparin drip was started last evening     2  Essential HTN (uncontrolled):  · Patient has had multiple ED visits with evidence of HTN but he has not followed up on out-patient treatment  · BP in /119 on admit; nitro paste applied with adequate response; this has since been d/c'd  · Amlodipine 5 mg QD and scheduled hydralazine 50 mg Q8 hrs was ordered yesterday evening  · BP overnight remained elevated; this AM reading is 168/105  · Continue amlodipine 5 mg QD, add metoprolol tartrate 25 Q6 hrs with PRN hydralazine 10 mg IV      3   CKD stage 3:   · Previously undiagnosed; baseline creat appears to be 1 2-1 4; creat on admit is 1 5  · Likely hypertensive kidney disease; creat today has improved to 1 38  · Would avoid ACE/ARB medications for BP control; continue to trend creat     4  GERD:  · Patient with long-standing history of epigastric pain/burning  · Multiple ED visits for the same; patient takes no home meds and has not follow up for outpatient testing  · Utilizes TUMS at home         Subjective  Review of Systems   Constitution: Negative for malaise/fatigue  Cardiovascular: Negative for chest pain, dyspnea on exertion, leg swelling, orthopnea and palpitations  Mild epigastric pain   Respiratory: Negative for shortness of breath  Gastrointestinal: Negative for nausea and vomiting  Neurological: Negative for dizziness and light-headedness  Objective:   Vitals: Blood pressure (!) 168/105, pulse 67, temperature 97 9 °F (36 6 °C), resp  rate 19, height 5' 10" (1 778 m), weight 102 kg (225 lb), SpO2 98 %  ,       Body mass index is 32 28 kg/m²  ,     Systolic (36AHE), IFJ:530 , Min:153 , FGB:746     Diastolic (78VQL), MQI:662, Min:84, Max:123        No intake or output data in the 24 hours ending 09/02/20 0835  Weight (last 2 days)     Date/Time   Weight    09/01/20 1310   102 (225)                Telemetry Review: No significant arrhythmias seen on telemetry review  Sinus in the 90's    EKG EKG done this AM reveals sinus rhythm with no acute ST/T wave abnormalities  Physical Exam  Vitals signs reviewed  Cardiovascular:      Rate and Rhythm: Normal rate and regular rhythm  Heart sounds: Normal heart sounds, S1 normal and S2 normal  No murmur  Pulmonary:      Effort: Pulmonary effort is normal  No respiratory distress  Breath sounds: Normal breath sounds  Abdominal:      General: Bowel sounds are normal  There is no distension  Palpations: Abdomen is soft  Musculoskeletal:      Right lower leg: No edema  Left lower leg: No edema     Skin: General: Skin is warm and dry  Neurological:      Mental Status: He is alert and oriented to person, place, and time     Psychiatric:         Mood and Affect: Mood normal          LABORATORY RESULTS  Results from last 7 days   Lab Units 09/02/20 0451 09/01/20  2354 09/01/20  1956   TROPONIN I ng/mL 8 46* 10 31* 5 57*     CBC with diff:   Results from last 7 days   Lab Units 09/02/20  0452 09/01/20 2127 09/01/20  1316 09/01/20  0736   WBC Thousand/uL 11 53* 13 63* 15 23* 12 77*   HEMOGLOBIN g/dL 16 7 16 7 17 2* 17 4*   HEMATOCRIT % 51 0* 50 2* 51 1* 52 2*   MCV fL 86 85 84 85   PLATELETS Thousands/uL 245 242 267 268   MCH pg 28 0 28 4 28 3 28 2   MCHC g/dL 32 7 33 3 33 7 33 3   RDW % 13 1 13 2 13 0 13 0   MPV fL 10 4 10 2 10 2 10 1   NRBC AUTO /100 WBCs 0  --  0 0       CMP:  Results from last 7 days   Lab Units 09/02/20 0452 09/01/20  1316 09/01/20  0736   POTASSIUM mmol/L 4 0 4 3 3 9   CHLORIDE mmol/L 107 102 104   CO2 mmol/L 30 28 29   BUN mg/dL 17 14 15   CREATININE mg/dL 1 38* 1 51* 1 43*   CALCIUM mg/dL 8 5 9 8 9 3   AST U/L 55*  --  32   ALT U/L 76  --  77   ALK PHOS U/L 86  --  99   EGFR ml/min/1 73sq m 63 57 60       BMP:  Results from last 7 days   Lab Units 09/02/20 0452 09/01/20  1316 09/01/20  0736   POTASSIUM mmol/L 4 0 4 3 3 9   CHLORIDE mmol/L 107 102 104   CO2 mmol/L 30 28 29   BUN mg/dL 17 14 15   CREATININE mg/dL 1 38* 1 51* 1 43*   CALCIUM mg/dL 8 5 9 8 9 3       No results found for: NTBNP          Results from last 7 days   Lab Units 09/02/20  0452   MAGNESIUM mg/dL 1 8                 Results from last 7 days   Lab Units 09/01/20  1316   TSH 3RD GENERATON uIU/mL 1 504       Results from last 7 days   Lab Units 09/02/20 0452 09/01/20 2127   INR  0 94 0 96       Lipid Profile:   No results found for: CHOL  Lab Results   Component Value Date    HDL 24 (L) 09/02/2020     Lab Results   Component Value Date    LDLCALC 128 (H) 09/02/2020     Lab Results   Component Value Date    TRIG 222 (H) 09/02/2020       Cardiac testing:   No results found for this or any previous visit  No results found for this or any previous visit  No results found for this or any previous visit  No procedure found  No results found for this or any previous visit  Meds/Allergies   current meds:   Current Facility-Administered Medications   Medication Dose Route Frequency    acetaminophen (TYLENOL) tablet 650 mg  650 mg Oral Q6H PRN    amLODIPine (NORVASC) tablet 5 mg  5 mg Oral Daily    aspirin (ECOTRIN LOW STRENGTH) EC tablet 81 mg  81 mg Oral Daily    atorvastatin (LIPITOR) tablet 40 mg  40 mg Oral Daily With Dinner    heparin (porcine) 25,000 units in 0 45% NaCl 250 mL infusion (premix)  3-20 Units/kg/hr (Order-Specific) Intravenous Titrated    heparin (porcine) injection 2,000 Units  2,000 Units Intravenous Q1H PRN    heparin (porcine) injection 4,000 Units  4,000 Units Intravenous Q1H PRN    hydrALAZINE (APRESOLINE) injection 10 mg  10 mg Intravenous Q6H PRN    metoprolol tartrate (LOPRESSOR) tablet 25 mg  25 mg Oral Q6H    nicotine (NICODERM CQ) 21 mg/24 hr TD 24 hr patch 1 patch  1 patch Transdermal Daily    nitroglycerin (NITROSTAT) SL tablet 0 4 mg  0 4 mg Sublingual Q5 Min PRN    ondansetron (ZOFRAN) injection 4 mg  4 mg Intravenous Q6H PRN     No medications prior to admission  heparin (porcine), 3-20 Units/kg/hr (Order-Specific), Last Rate: 13 1 Units/kg/hr (09/02/20 7872)        Assessment:  Principal Problem:    Type 2 MI (myocardial infarction) ([facility] [address] )  Active Problems:    Hypertensive urgency    GERD (gastroesophageal reflux disease)    Tobacco abuse    Chronic kidney disease, stage 3 ([facility])        Counseling / Coordination of Care  Total floor / unit time spent today 20 minutes  Greater than 50% of total time was spent with the patient and / or family counseling and / or coordination of care        ** Please Note: Dragon 360 Dictation voice to text software may have been used in the creation of this document   **

## 2020-09-02 NOTE — ASSESSMENT & PLAN NOTE
Therapeutic lifestyle modification discussed  Reports history of snoring and apneic episodes - outpatient sleep study strongly recommended he verbalized understanding

## 2020-09-02 NOTE — PLAN OF CARE
Problem: PAIN - ADULT  Goal: Verbalizes/displays adequate comfort level or baseline comfort level  Description: Interventions:  - Encourage patient to monitor pain and request assistance  - Assess pain using appropriate pain scale  - Administer analgesics based on type and severity of pain and evaluate response  - Implement non-pharmacological measures as appropriate and evaluate response  - Consider cultural and social influences on pain and pain management  - Notify physician/advanced practitioner if interventions unsuccessful or patient reports new pain  Outcome: Progressing     Problem: INFECTION - ADULT  Goal: Absence or prevention of progression during hospitalization  Description: INTERVENTIONS:  - Assess and monitor for signs and symptoms of infection  - Monitor lab/diagnostic results  - Monitor all insertion sites, i e  indwelling lines, tubes, and drains  - Monitor endotracheal if appropriate and nasal secretions for changes in amount and color  - [place] appropriate cooling/warming therapies per order  - Administer medications as ordered  - Instruct and encourage patient and family to use good hand hygiene technique  - Identify and instruct in appropriate isolation precautions for identified infection/condition  Outcome: Progressing  Goal: Absence of fever/infection during neutropenic period  Description: INTERVENTIONS:  - Monitor WBC    Outcome: Progressing     Problem: SAFETY ADULT  Goal: Patient will remain free of falls  Description: INTERVENTIONS:  - Assess patient frequently for physical needs  -  Identify cognitive and physical deficits and behaviors that affect risk of falls    -  [place] fall precautions as indicated by assessment   - Educate patient/family on patient safety including physical limitations  - Instruct patient to call for assistance with activity based on assessment  - Modify environment to reduce risk of injury  - Consider OT/PT consult to assist with strengthening/mobility  Outcome: Progressing  Goal: Maintain or return to baseline ADL function  Description: INTERVENTIONS:  -  Assess patient's ability to carry out ADLs; assess patient's baseline for ADL function and identify physical deficits which impact ability to perform ADLs (bathing, care of mouth/teeth, toileting, grooming, dressing, etc )  - Assess/evaluate cause of self-care deficits   - Assess range of motion  - Assess patient's mobility; develop plan if impaired  - Assess patient's need for assistive devices and provide as appropriate  - Encourage maximum independence but intervene and supervise when necessary  - Involve family in performance of ADLs  - Assess for home care needs following discharge   - Consider OT consult to assist with ADL evaluation and planning for discharge  - Provide patient education as appropriate  Outcome: Progressing  Goal: Maintain or return mobility status to optimal level  Description: INTERVENTIONS:  - Assess patient's baseline mobility status (ambulation, transfers, stairs, etc )    - Identify cognitive and physical deficits and behaviors that affect mobility  - Identify mobility aids required to assist with transfers and/or ambulation (gait belt, sit-to-stand, lift, walker, cane, etc )  - [place] fall precautions as indicated by assessment  - Record patient progress and toleration of activity level on Mobility SBAR; progress patient to next Phase/Stage  - Instruct patient to call for assistance with activity based on assessment  - Consider rehabilitation consult to assist with strengthening/weightbearing, etc   Outcome: Progressing     Problem: DISCHARGE PLANNING  Goal: Discharge to home or other facility with appropriate resources  Description: INTERVENTIONS:  - Identify barriers to discharge w/patient and caregiver  - Arrange for needed discharge resources and transportation as appropriate  - Identify discharge learning needs (meds, wound care, etc )  - Arrange for interpretive services to assist at discharge as needed  - Refer to Case Management Department for coordinating discharge planning if the patient needs post-hospital services based on physician/advanced practitioner order or complex needs related to functional status, cognitive ability, or social support system  Outcome: Progressing     Problem: Knowledge Deficit  Goal: Patient/family/caregiver demonstrates understanding of disease process, treatment plan, medications, and discharge instructions  Description: Complete learning assessment and assess knowledge base    Interventions:  - Provide teaching at level of understanding  - Provide teaching via preferred learning methods  Outcome: Progressing     Problem: CARDIOVASCULAR - ADULT  Goal: Maintains optimal cardiac output and hemodynamic stability  Description: INTERVENTIONS:  - Monitor I/O, vital signs and rhythm  - Monitor for S/S and trends of decreased cardiac output  - Administer and titrate ordered vasoactive medications to optimize hemodynamic stability  - Assess quality of pulses, skin color and temperature  - Assess for signs of decreased coronary artery perfusion  - Instruct patient to report change in severity of symptoms  Outcome: Progressing  Goal: Absence of cardiac dysrhythmias or at baseline rhythm  Description: INTERVENTIONS:  - Continuous cardiac monitoring, vital signs, obtain 12 lead EKG if ordered  - Administer antiarrhythmic and heart rate control medications as ordered  - Monitor electrolytes and administer replacement therapy as ordered  Outcome: Progressing     Problem: HEMATOLOGIC - ADULT  Goal: Maintains hematologic stability  Description: INTERVENTIONS  - Assess for signs and symptoms of bleeding or hemorrhage  - Monitor labs  - Administer supportive blood products/factors as ordered and appropriate  Outcome: Progressing

## 2020-09-02 NOTE — PROGRESS NOTES
Repeat troponin 5 56  Patient currently remains chest pain-free  Hypertensive however started on oral antihypertensives today will monitor response and have p r n  Hydralazine  Spoke with Dr Lelia Barry with cardiology and will initiate heparin drip  Rest of plan as stated by cardiology in her note

## 2020-09-02 NOTE — PROGRESS NOTES
Pt seen post transfer from [facility]  Initially admitted with hypertensive urgency elevated troponin, transferred for coronary angiography  Echocardiogram was performed today that showed preserved LV systolic function with EF of 55% with moderate hypokinesis of the basal-mid inferior wall  He remains chest pain-free  Continue intravenous heparin, aspirin, statin and beta-blocker and p r n  Nitroglycerin for chest pain  He was loaded Brilinta 180 mg this morning will continue 90 mg b i d  until results of cardiac catheterization are known  Does have chronic kidney disease with a baseline creatinine of 1 21 4; currently 1 3; will start gentle IV fluids at midnight tonight  Repeat BMP in a m  Current blood pressure 134/96

## 2020-09-02 NOTE — H&P
H&P- Jaclyn Mary 1980, 38 y o  male MRN: [id number]    Unit/Bed#: -01 Encounter: [id number]    Primary Care Provider: Anthony Bower MD   Date and time admitted to hospital: 9/2/2020  3:11 PM        * [facility]  Assessment & Plan  Non MI elevated troponin  2D echo reveals EF 55%, moderate hypokinesis basal mid inferior wall  Continue IV heparin  Patient is transferred to [facility] for cardiac catheterization  Continue aspirin Brilinta beta-blocker statin  Cardiology following      Obese  Assessment & Plan  Therapeutic lifestyle modification discussed  Reports history of snoring and apneic episodes - outpatient sleep study strongly recommended he verbalized understanding    Chronic kidney disease, stage 3 ([facility] )  Assessment & Plan  Monitor kidney function closely  Avoid nephrotoxins    Tobacco abuse  Assessment & Plan  Tobacco cessation discussed  Nicotine replacement while inpatient    GERD (gastroesophageal reflux disease)  Assessment & Plan  Continue pantoprazole    Hypertensive urgency  Assessment & Plan  Noted [facility] [place]  Blood pressure is improving  Continue antihypertensive medications  Avoid hypotension        VTE Prophylaxis: Heparin Drip  / sequential compression device   Code Status:  Full code  POLST: There is no POLST form on file for this patient (pre-hospital)  Discussion with family:  Discussed with the patient, family at bedside updated    Anticipated Length of Stay:  Patient will be admitted on an Inpatient basis with an anticipated length of stay of  more than 2 midnights  Justification for Hospital Stay:  Transferred for cardiac catheterization cardiology inputs        Chief Complaint:       Transferred from [address] [place] [address] for cardiac catheterization    History of Present Illness:    Jaclyn Hernandez is a 38 y o  male who presents with transfer from [address] [place] [address] for cardiac catheterization    Patient initially presented to  [facility] with epigastric pain nausea and reflux-like symptoms  He was admitted noted to hypertensive urgency, he was also noted elevated troponins this was attributed to hypertensive urgency  He underwent 2D echo which revealed EF of 55% with hypokinesis basal mid inferior walls hence patient is transferred to [facility] for further workup including cardiac catheterization  Patient's hospital stay at [address] [place] [address] reviewed in detail  Patient is presently comfortably sitting up in chair, denies chest pain shortness of breath palpitations diaphoresis  He reports headache which she feels is due to neck pain  He denies nausea  No history of abdominal pain epigastric discomfort  No history of fever chills sweats or constitutional symptoms  Denies urinary symptoms  History of arthritis arthralgia myalgias or rash    On inquiry patient reports being noncompliant with his medications, he has not seen his primary care physician  He smokes up to 1 pack of cigarettes every day  He reports history of snoring with episodes of apnea    Review of Systems:    Review of Systems   All other systems reviewed and are negative        Past Medical and Surgical History:     Past Medical History:   Diagnosis Date    Chronic pain     GERD (gastroesophageal reflux disease)     Hypertension        Past Surgical History:   Procedure Laterality Date    NO PAST SURGERIES         Meds/Allergies:    Prior to Admission medications    Not on File     Epic    Allergies: No Known Allergies    Social History:     Marital Status: Single   Occupation:   Patient Pre-hospital Living Situation: home  Patient Pre-hospital Level of Mobility:  Independently  Patient Pre-hospital Diet Restrictions: no  Substance Use History:   Social History     Substance and Sexual Activity   Alcohol Use Yes    Comment: social     Social History     Tobacco Use   Smoking Status Current Every Day Smoker    Packs/day: 1 00   Smokeless Tobacco Never Used     Social History     Substance and Sexual Activity   Drug Use Never       Family History:    No family history on file  Physical Exam:     Vitals:   Blood Pressure: 134/96 (09/02/20 1529)  Pulse: 61 (09/02/20 1529)  Temperature: 98 1 °F (36 7 °C) (09/02/20 1529)  Respirations: 20 (09/02/20 1529)  Height: 5' 10" (177 8 cm) (09/02/20 1518)  Weight - Scale: 101 kg (223 lb 8 7 oz) (09/02/20 1518)  SpO2: 99 % (09/02/20 1529)    Physical Exam    Comfortably sitting up in chair  Obese  Short thick neck  Lungs diminished breath sounds bilaterally   Vesicular breath sounds, no additional sounds  Heart sounds S1-S2 noted no murmurs appreciable  Abdomen soft nontender  No organs palpable  Awake alert obeys simple commands  Pulses noted  No rash  No pedal edema      Additional Data:     Lab Results: I have personally reviewed pertinent reports  Results from last 7 days   Lab Units 09/02/20  1621   WBC Thousand/uL 12 14*   HEMOGLOBIN g/dL 17 7*   HEMATOCRIT % 53 5*   PLATELETS Thousands/uL 265   NEUTROS PCT % 67   LYMPHS PCT % 22   MONOS PCT % 7   EOS PCT % 2     Results from last 7 days   Lab Units 09/02/20  0452   SODIUM mmol/L 144   POTASSIUM mmol/L 4 0   CHLORIDE mmol/L 107   CO2 mmol/L 30   BUN mg/dL 17   CREATININE mg/dL 1 38*   ANION GAP mmol/L 7   CALCIUM mg/dL 8 5   ALBUMIN g/dL 3 6   TOTAL BILIRUBIN mg/dL 0 60   ALK PHOS U/L 86   ALT U/L 76   AST U/L 55*   GLUCOSE RANDOM mg/dL 95     Results from last 7 days   Lab Units 09/02/20  0452   INR  0 94         Results from last 7 days   Lab Units 09/02/20  0452   HEMOGLOBIN A1C % 5 4       Imaging: I have personally reviewed pertinent reports  EKG, Pathology, and Other Studies Reviewed on Admission:   · EKG:  Sinus rhythm no ST T-wave changes    Allscripts / Epic Records Reviewed: Yes     ** Please Note: This note has been constructed using a voice recognition system   **

## 2020-09-02 NOTE — ASSESSMENT & PLAN NOTE
Patient presented with creatinine of 1 51  Baseline is from 1 27-1 39  Creatinine this morning is down to 1 38  Avoid hypotension/nephrotoxins medication  Urinary retention protocol  Monitor renal function in a m

## 2020-09-02 NOTE — PROGRESS NOTES
Patient was having chest pain, Dr Arturo Payne made aware, Verlon Balsam was given and chest pain resolved

## 2020-09-02 NOTE — ASSESSMENT & PLAN NOTE
Non STEMI type 2 In the setting of hypertensive urgency  Monitor on telemetry  Patient troponin peaked at 10 3 overnight  Patient was started on heparin drip after discussion with on-call cardiologist Dr Chloe Gold  Cardiology follow-up noted this morning and as per Dr Robb Post, patient should be transferred to [facility] for cardiac catheterization evaluation  Patient is chest pain-free  Nitro p r n  Patient was given loading dose of Brilinta and was continued on baby aspirin and Lipitor  Patient is also started on Lopressor 25 mg q 6 hours  Continue on heparin drip and follow as per protocol  Discussed with patient need is in agreement with the transfer plan  Discussed with the [facility] physician on call who accepted the patient under their service    Patient is hemodynamically stable for transfer

## 2020-09-02 NOTE — OCCUPATIONAL THERAPY NOTE
Occupational Therapy Screen    Patient Name: Mildred Spring  [id number] Date: 9/2/2020    OT orders received and chart reviewed  Spoke to RN Lilia who states pt is currently independent in his room, and likely transferring to B at some point today  Spoke to pt who also confirms that he is independent and denies therapy needs at this time  Recommend pt continue to be OOB for meals, ambulation to/from BR, perform self care tasks, and mobility in hallway with nursing  At this time, OT recommendations at time of discharge are return home with family support  No acute OT needs identified at this time; please re-consult if OT needs arise during remainder of hospital stay      Arria NLG, MS, OTR/L

## 2020-09-02 NOTE — UTILIZATION REVIEW
Initial Clinical Review    Admission: Date/Time/Statement:   Admission Orders (From admission, onward)     Ordered        09/01/20 1425  Inpatient Admission (expected length of stay for this patient Order details is greater than two midnights)  Once                   Orders Placed This Encounter   Procedures    Inpatient Admission (expected length of stay for this patient Order details is greater than two midnights)     Standing Status:   Standing     Number of Occurrences:   1     Order Specific Question:   Admitting Physician     Answer:   Kate Melendez [[id number]]     Order Specific Question:   Level of Care     Answer:   Med Surg [16]     Order Specific Question:   Estimated length of stay     Answer:   More than 2 Midnights     Order Specific Question:   Certification     Answer:   I certify that inpatient services are medically necessary for this patient for a duration of greater than two midnights  See H&P and MD Progress Notes for additional information about the patient's course of treatment  ED Arrival Information     Expected Arrival Acuity Means of Arrival Escorted By Service Admission Type    - 9/1/2020 13:03 Urgent Walk-In Self General Medicine Urgent    Arrival Complaint    Heartburn        Chief Complaint   Patient presents with    Chest Pain     patient presents to the ED to be admitted per the advisement of his last ED visit one hour ago  patient left AMA and has returned for admission      Assessment/Plan: 37 yo male presents to ED after leaving ed AMA 1 hr ago when came in for epigastric discomfort ,nausea and lightheadedness, and L arm tingling that were there when he awoke from sleep  Pt states these symptoms have happened a few months ago too  Upon return to ED , initial troponin neg but later troponin elevated  Pt denies pain, sob , BP elevated, pt lightheaded , tinnitus,and c/o nausea on exam  CXR and ekg unremarkable    Serial labs ordered with telemetry, ASA, anti-hypertensives, NTG oint and cardio consultt ordered along with  ECHO for 9/2  PMH includes smoker,GERD , HTN   Pt admitted as IP with dx of  Type 2 MI , Hypertensive urgency    Cardio 9/1 -Per cardiology, if echo9/2 significantly abnormal will transfer to [place]  If echo w/o signif LV dysfunction , will do stress test   If creat improves , will need ace inhibitor or ARB on D/C  Continue telem and  troponins  pt has poss underlying CAD and previously undiagnosed CKD 3   Recommend avoid Beta blockers  Due to repeat troponin=5 56, initiated heparin drip, pain free  9/2 overnight troponins continued to trend with peak of 10 3 Cardio recommends pt transfer [facility] for cardiac cath  Pt started on lopressor and brilinta, hemodynamically stable for transfer   Exam wnl  CARDIO 9/2  recommends-transfer to [facility] for cath for higher level of care , pt agreeable  BP remained elevated overnight, continue Amlodipine,metoprolol and prn IV hydralazine  creat improved today  Avoid ACE/ ARB  for bp control  Heparin drip continues    ED Triage Vitals [09/01/20 1310]   Temperature Pulse Respirations Blood Pressure SpO2   97 7 °F (36 5 °C) (!) 110 20 (!) 203/119 97 %      Temp Source Heart Rate Source Patient Position - Orthostatic VS BP Location FiO2 (%)   Temporal Monitor Sitting Right arm --      Pain Score       No Pain          Wt Readings from Last 1 Encounters:   09/01/20 102 kg (225 lb)     Additional Vital Signs:   Date/Time   Temp   Pulse   Resp   BP   MAP (mmHg)   SpO2   O2 Device   Patient Position - Orthostatic VS    09/02/20 08:18:41   97 9 °F (36 6 °C)   67      168/105Abnormal     126   98 %          09/02/20 0500            159/118Abnormal                  09/01/20 23:59:09   98 7 °F (37 1 °C)   67      177/112Abnormal     134   97 %          09/01/20 20:28:18      81      169/107Abnormal        99 %          09/01/20 1712      82      174/105Abnormal     128   98 %          09/01/20 15:43:24   98 9 °F (37 2 °C)   70 19   179/123Abnormal     142   97 %          09/01/20 14:58:33   98 3 °F (36 8 °C)   97      168/122Abnormal     137   98 %          09/01/20 1400      81   19   153/89   116   96 %          09/01/20 1330      92   19   181/114Abnormal     139   96 %          09/01/20 1315      99   15   203/119Abnormal     153   96 %             Pertinent Labs/Diagnostic Test Results:   CXR 9/1  No acute cardiopulmonary disease    XRAY rib 9/1  No active cardiopulmonary disease    No evidence of rib fractures    CT ABDOMEN 9/1  1  Mild mucosal thickening in left and central small bowel loops  Collapsed left colon and sigmoid colon with equivocal mild mucosal thickening also noted  Correlate clinically to exclude a mild enterocolitis  Otherwise no acute intra-abdominal   pathology  2   Polycystic kidneys  Numerous hepatic cysts also noted      EKG9/1  Sinus tachycardia  Otherwise normal ECG     EKG- 9/1  Normal sinus rhythm with sinus arrhythmia  Normal ECG         ECHO 9/2- DONE , not final yet  Results from last 7 days   Lab Units 09/02/20 0452 09/01/20 2127 09/01/20  1316 09/01/20  0736   WBC Thousand/uL 11 53* 13 63* 15 23* 12 77*   HEMOGLOBIN g/dL 16 7 16 7 17 2* 17 4*   HEMATOCRIT % 51 0* 50 2* 51 1* 52 2*   PLATELETS Thousands/uL 245 242 267 268   NEUTROS ABS Thousands/µL 5 71  --  10 68* 6 19         Results from last 7 days   Lab Units 09/02/20 0452 09/01/20  1316 09/01/20  0736   SODIUM mmol/L 144 137 141   POTASSIUM mmol/L 4 0 4 3 3 9   CHLORIDE mmol/L 107 102 104   CO2 mmol/L 30 28 29   ANION GAP mmol/L 7 7 8   BUN mg/dL 17 14 15   CREATININE mg/dL 1 38* 1 51* 1 43*   EGFR ml/min/1 73sq m 63 57 60   CALCIUM mg/dL 8 5 9 8 9 3   MAGNESIUM mg/dL 1 8  --   --    PHOSPHORUS mg/dL 4 7*  --   --      Results from last 7 days   Lab Units 09/02/20 0452 09/01/20  0736   AST U/L 55* 32   ALT U/L 76 77   ALK PHOS U/L 86 99   TOTAL PROTEIN g/dL 6 8 7 1   ALBUMIN g/dL 3 6 3 9   TOTAL BILIRUBIN mg/dL 0 60 0 30 Results from last 7 days   Lab Units 09/02/20  0452 09/01/20  1316 09/01/20  0736   GLUCOSE RANDOM mg/dL 95 109 115           Results from last 7 days   Lab Units 09/02/20  0451 09/01/20  2354 09/01/20  1956 09/01/20  1700 09/01/20  1316 09/01/20  1034 09/01/20  0736   TROPONIN I ng/mL 8 46* 10 31* 5 57* 3 33* 1 12* 0 40* <0 02         Results from last 7 days   Lab Units 09/02/20  0452 09/01/20  2127   PROTIME seconds 12 6 12 8   INR  0 94 0 96   PTT seconds 49* 36     Results from last 7 days   Lab Units 09/01/20  1316   TSH 3RD GENERATON uIU/mL 1 504           Results from last 7 days   Lab Units 09/02/20  0452 09/01/20  0736   LIPASE u/L 180 232           ED Treatment:   Medication Administration from 09/01/2020 1303 to 09/01/2020 1450       Date/Time Order Dose Route Action Action by Comments     09/01/2020 1322 nitroglycerin (NITRO-BID) 2 % TD ointment 1 inch 1 inch Topical Given Cory Lauren RN      09/01/2020 1327 aspirin chewable tablet 324 mg 324 mg Oral Given Cory Lauren RN         Past Medical History:   Diagnosis Date    Chronic pain     GERD (gastroesophageal reflux disease)     Hypertension          Admitting Diagnosis: Heartburn [R12]  NSTEMI (non-ST elevated myocardial infarction) ([facility] ) [I21 4]  [facility] ) [N17 9]  Hypertensive emergency [I16 1]  Age/Sex: 38 y o  male  Admission Orders:  Scheduled Medications:  amLODIPine, 5 mg, Oral, Daily  aspirin, 81 mg, Oral, Daily  atorvastatin, 40 mg, Oral, Daily With Dinner  metoprolol tartrate, 25 mg, Oral, Q6H  nicotine, 1 patch, Transdermal, Daily    aluminum-magnesium hydroxide-simethicone (MYLANTA) 200-200-20 mg/5 mL oral suspension 30 mL    Dose: 30 mL  Freq: Once Route: PO x1 9/2    heparin (porcine) injection 4,000 Units    Dose: 4,000 Units  Freq: Once Route: IV x1 9/1  ticagrelor (BRILINTA) tablet 180 mg    Dose: 180 mg  Freq:  Once Route: PO x1 9/2    Continuous IV Infusions:  heparin (porcine), 3-20 Units/kg/hr (Order-Specific), Intravenous, Titrated      PRN Meds:  acetaminophen, 650 mg, Oral, Q6H PRN  heparin (porcine), 2,000 Units, Intravenous, Q1H PRN  heparin (porcine), 4,000 Units, Intravenous, Q1H PRN  hydrALAZINE, 10 mg, Intravenous, Q6H PRN x1 9/2  nitroglycerin, 0 4 mg, Sublingual, Q5 Min PRN  ondansetron, 4 mg, Intravenous, Q6H PRN    TELEMETRY  scd's  Cardio diet    IP CONSULT TO CARDIOLOGY  IP CONSULT TO CASE MANAGEMENT    Network Utilization Review Department  [email or web address]  ATTENTION: Please call with any questions or concerns to [phone] and carefully listen to the prompts so that you are directed to the right person  All voicemails are confidential   Ankita Fabiano all requests for admission clinical reviews, approved or denied determinations and any other requests to dedicated fax number below belonging to the campus where the patient is receiving treatment   List of dedicated fax numbers for the Facilities:  [address] [place] [address] DENIALS (Administrative/Medical Necessity) [phone]   [address] [place] [address] (Maternity/NICU/Pediatrics) [phone]   Jil Velazquez [phone]   Meredith Briggs [phone]   Cullen Opitz [phone]   Maged Weldon [phone]   [address] [place] [address] [phone]   [facility]  [phone]   [address] [facility] [address]  [address] [facility] [address] [facility] [phone]

## 2020-09-02 NOTE — ASSESSMENT & PLAN NOTE
Noted [facility]  Blood pressure is improving  Continue antihypertensive medications  Avoid hypotension

## 2020-09-02 NOTE — ASSESSMENT & PLAN NOTE
Start on Norvasc and started on Lopressor  Monitor vitals as per protocol  Hydralazine p r n   Systolic blood pressure greater than 160

## 2020-09-02 NOTE — UTILIZATION REVIEW
Notification of Discharge  This is a Notification of Discharge from our facility [address]  Please be advised that this patient has been discharge from our facility  Below you will find the admission and discharge date and time including the patients disposition  PRESENTATION DATE: 9/1/2020  1:06 PM  OBS ADMISSION DATE:   IP ADMISSION DATE: 9/1/20 1425   DISCHARGE DATE: 9/2/2020  2:41 PM  DISPOSITION: [address]   Admission Orders listed below:  Admission Orders (From admission, onward)     Ordered        09/01/20 1425  Inpatient Admission (expected length of stay for this patient Order details is greater than two midnights)  Once                   Please contact the UR Department if additional information is required to close this patient's authorization/case  [facility] Utilization Review Department  Main: [phone] x carefully listen to the prompts  All voicemails are confidential   [email or web address]  Send all requests for admission clinical reviews, approved or denied determinations and any other requests to dedicated fax number below belonging to the campus where the patient is receiving treatment   List of dedicated fax numbers:  [address] DENIALS (Administrative/Medical Necessity) [phone]   [address] (Maternity/NICU/Pediatrics) [phone]   Krupa Handing [phone]   Masah White [phone]   Tati Chao [phone]   Gaby Joseph [address] [phone]   [facility]  [phone]   [address] [facility] [address]  [address] [facility] [address] [facility] [phone]

## 2020-09-02 NOTE — PLAN OF CARE
Problem: PAIN - ADULT  Goal: Verbalizes/displays adequate comfort level or baseline comfort level  Description: Interventions:  - Encourage patient to monitor pain and request assistance  - Assess pain using appropriate pain scale  - Administer analgesics based on type and severity of pain and evaluate response  - Implement non-pharmacological measures as appropriate and evaluate response  - Consider cultural and social influences on pain and pain management  - Notify physician/advanced practitioner if interventions unsuccessful or patient reports new pain  Outcome: Progressing     Problem: INFECTION - ADULT  Goal: Absence or prevention of progression during hospitalization  Description: INTERVENTIONS:  - Assess and monitor for signs and symptoms of infection  - Monitor lab/diagnostic results  - Monitor all insertion sites, i e  indwelling lines, tubes, and drains  - Monitor endotracheal if appropriate and nasal secretions for changes in amount and color  - [place] appropriate cooling/warming therapies per order  - Administer medications as ordered  - Instruct and encourage patient and family to use good hand hygiene technique  - Identify and instruct in appropriate isolation precautions for identified infection/condition  Outcome: Progressing  Goal: Absence of fever/infection during neutropenic period  Description: INTERVENTIONS:  - Monitor WBC    Outcome: Progressing     Problem: SAFETY ADULT  Goal: Patient will remain free of falls  Description: INTERVENTIONS:  - Assess patient frequently for physical needs  -  Identify cognitive and physical deficits and behaviors that affect risk of falls    -  [place] fall precautions as indicated by assessment   - Educate patient/family on patient safety including physical limitations  - Instruct patient to call for assistance with activity based on assessment  - Modify environment to reduce risk of injury  - Consider OT/PT consult to assist with strengthening/mobility  Outcome: Progressing  Goal: Maintain or return to baseline ADL function  Description: INTERVENTIONS:  -  Assess patient's ability to carry out ADLs; assess patient's baseline for ADL function and identify physical deficits which impact ability to perform ADLs (bathing, care of mouth/teeth, toileting, grooming, dressing, etc )  - Assess/evaluate cause of self-care deficits   - Assess range of motion  - Assess patient's mobility; develop plan if impaired  - Assess patient's need for assistive devices and provide as appropriate  - Encourage maximum independence but intervene and supervise when necessary  - Involve family in performance of ADLs  - Assess for home care needs following discharge   - Consider OT consult to assist with ADL evaluation and planning for discharge  - Provide patient education as appropriate  Outcome: Progressing  Goal: Maintain or return mobility status to optimal level  Description: INTERVENTIONS:  - Assess patient's baseline mobility status (ambulation, transfers, stairs, etc )    - Identify cognitive and physical deficits and behaviors that affect mobility  - Identify mobility aids required to assist with transfers and/or ambulation (gait belt, sit-to-stand, lift, walker, cane, etc )  - [place] fall precautions as indicated by assessment  - Record patient progress and toleration of activity level on Mobility SBAR; progress patient to next Phase/Stage  - Instruct patient to call for assistance with activity based on assessment  - Consider rehabilitation consult to assist with strengthening/weightbearing, etc   Outcome: Progressing     Problem: DISCHARGE PLANNING  Goal: Discharge to home or other facility with appropriate resources  Description: INTERVENTIONS:  - Identify barriers to discharge w/patient and caregiver  - Arrange for needed discharge resources and transportation as appropriate  - Identify discharge learning needs (meds, wound care, etc )  - Arrange for interpretive services to assist at discharge as needed  - Refer to Case Management Department for coordinating discharge planning if the patient needs post-hospital services based on physician/advanced practitioner order or complex needs related to functional status, cognitive ability, or social support system  Outcome: Progressing     Problem: Knowledge Deficit  Goal: Patient/family/caregiver demonstrates understanding of disease process, treatment plan, medications, and discharge instructions  Description: Complete learning assessment and assess knowledge base    Interventions:  - Provide teaching at level of understanding  - Provide teaching via preferred learning methods  Outcome: Progressing

## 2020-09-03 ENCOUNTER — APPOINTMENT (INPATIENT)
Dept: NON INVASIVE DIAGNOSTICS | Facility: HOSPITAL | Age: 40
DRG: 190 | End: 2020-09-03
Payer: COMMERCIAL

## 2020-09-03 PROBLEM — E78.5 HLD (HYPERLIPIDEMIA): Status: ACTIVE | Noted: 2020-09-03

## 2020-09-03 PROBLEM — I21.4 TYPE 1 NON-ST ELEVATION MYOCARDIAL INFARCTION (NSTEMI) (HCC): Status: ACTIVE | Noted: 2020-09-01

## 2020-09-03 PROBLEM — R77.8 ELEVATED TROPONIN: Status: ACTIVE | Noted: 2020-09-01

## 2020-09-03 PROBLEM — R79.89 ELEVATED TROPONIN: Status: ACTIVE | Noted: 2020-09-01

## 2020-09-03 LAB
ANION GAP SERPL CALCULATED.3IONS-SCNC: 7 MMOL/L (ref 4–13)
APTT PPP: 73 SECONDS (ref 23–37)
APTT PPP: 82 SECONDS (ref 23–37)
ATRIAL RATE: 64 BPM
ATRIAL RATE: 67 BPM
ATRIAL RATE: 74 BPM
ATRIAL RATE: 79 BPM
BASOPHILS # BLD AUTO: 0.07 THOUSANDS/ΜL (ref 0–0.1)
BASOPHILS NFR BLD AUTO: 1 % (ref 0–1)
BUN SERPL-MCNC: 18 MG/DL (ref 5–25)
CALCIUM SERPL-MCNC: 8.4 MG/DL (ref 8.3–10.1)
CHLORIDE SERPL-SCNC: 111 MMOL/L (ref 100–108)
CO2 SERPL-SCNC: 23 MMOL/L (ref 21–32)
CREAT SERPL-MCNC: 1.19 MG/DL (ref 0.6–1.3)
EOSINOPHIL # BLD AUTO: 0.24 THOUSAND/ΜL (ref 0–0.61)
EOSINOPHIL NFR BLD AUTO: 2 % (ref 0–6)
ERYTHROCYTE [DISTWIDTH] IN BLOOD BY AUTOMATED COUNT: 13.3 % (ref 11.6–15.1)
GFR SERPL CREATININE-BSD FRML MDRD: 75 ML/MIN/1.73SQ M
GLUCOSE SERPL-MCNC: 97 MG/DL (ref 65–140)
HCT VFR BLD AUTO: 48.1 % (ref 36.5–49.3)
HGB BLD-MCNC: 16.2 G/DL (ref 12–17)
IMM GRANULOCYTES # BLD AUTO: 0.04 THOUSAND/UL (ref 0–0.2)
IMM GRANULOCYTES NFR BLD AUTO: 0 % (ref 0–2)
LYMPHOCYTES # BLD AUTO: 4.24 THOUSANDS/ΜL (ref 0.6–4.47)
LYMPHOCYTES NFR BLD AUTO: 34 % (ref 14–44)
MCH RBC QN AUTO: 28.4 PG (ref 26.8–34.3)
MCHC RBC AUTO-ENTMCNC: 33.7 G/DL (ref 31.4–37.4)
MCV RBC AUTO: 84 FL (ref 82–98)
MONOCYTES # BLD AUTO: 0.9 THOUSAND/ΜL (ref 0.17–1.22)
MONOCYTES NFR BLD AUTO: 7 % (ref 4–12)
NEUTROPHILS # BLD AUTO: 7.04 THOUSANDS/ΜL (ref 1.85–7.62)
NEUTS SEG NFR BLD AUTO: 56 % (ref 43–75)
NRBC BLD AUTO-RTO: 0 /100 WBCS
P AXIS: 47 DEGREES
P AXIS: 55 DEGREES
P AXIS: 55 DEGREES
P AXIS: 60 DEGREES
PLATELET # BLD AUTO: 254 THOUSANDS/UL (ref 149–390)
PMV BLD AUTO: 10.3 FL (ref 8.9–12.7)
POTASSIUM SERPL-SCNC: 3.8 MMOL/L (ref 3.5–5.3)
PR INTERVAL: 152 MS
PR INTERVAL: 154 MS
PR INTERVAL: 156 MS
PR INTERVAL: 160 MS
QRS AXIS: 21 DEGREES
QRS AXIS: 25 DEGREES
QRS AXIS: 26 DEGREES
QRS AXIS: 28 DEGREES
QRSD INTERVAL: 90 MS
QRSD INTERVAL: 90 MS
QRSD INTERVAL: 94 MS
QRSD INTERVAL: 96 MS
QT INTERVAL: 388 MS
QT INTERVAL: 388 MS
QT INTERVAL: 412 MS
QT INTERVAL: 414 MS
QTC INTERVAL: 425 MS
QTC INTERVAL: 430 MS
QTC INTERVAL: 437 MS
QTC INTERVAL: 444 MS
RBC # BLD AUTO: 5.71 MILLION/UL (ref 3.88–5.62)
SODIUM SERPL-SCNC: 141 MMOL/L (ref 136–145)
T WAVE AXIS: 72 DEGREES
T WAVE AXIS: 73 DEGREES
T WAVE AXIS: 87 DEGREES
T WAVE AXIS: 92 DEGREES
VENTRICULAR RATE: 64 BPM
VENTRICULAR RATE: 67 BPM
VENTRICULAR RATE: 74 BPM
VENTRICULAR RATE: 79 BPM
WBC # BLD AUTO: 12.53 THOUSAND/UL (ref 4.31–10.16)

## 2020-09-03 PROCEDURE — 93005 ELECTROCARDIOGRAM TRACING: CPT

## 2020-09-03 PROCEDURE — B2151ZZ FLUOROSCOPY OF LEFT HEART USING LOW OSMOLAR CONTRAST: ICD-10-PCS | Performed by: INTERNAL MEDICINE

## 2020-09-03 PROCEDURE — 93458 L HRT ARTERY/VENTRICLE ANGIO: CPT | Performed by: NURSE PRACTITIONER

## 2020-09-03 PROCEDURE — 4A023N7 MEASUREMENT OF CARDIAC SAMPLING AND PRESSURE, LEFT HEART, PERCUTANEOUS APPROACH: ICD-10-PCS | Performed by: INTERNAL MEDICINE

## 2020-09-03 PROCEDURE — C1769 GUIDE WIRE: HCPCS | Performed by: NURSE PRACTITIONER

## 2020-09-03 PROCEDURE — 85025 COMPLETE CBC W/AUTO DIFF WBC: CPT | Performed by: INTERNAL MEDICINE

## 2020-09-03 PROCEDURE — 85730 THROMBOPLASTIN TIME PARTIAL: CPT | Performed by: INTERNAL MEDICINE

## 2020-09-03 PROCEDURE — 80048 BASIC METABOLIC PNL TOTAL CA: CPT | Performed by: INTERNAL MEDICINE

## 2020-09-03 PROCEDURE — 93010 ELECTROCARDIOGRAM REPORT: CPT | Performed by: INTERNAL MEDICINE

## 2020-09-03 PROCEDURE — 93458 L HRT ARTERY/VENTRICLE ANGIO: CPT | Performed by: INTERNAL MEDICINE

## 2020-09-03 PROCEDURE — B2111ZZ FLUOROSCOPY OF MULTIPLE CORONARY ARTERIES USING LOW OSMOLAR CONTRAST: ICD-10-PCS | Performed by: INTERNAL MEDICINE

## 2020-09-03 PROCEDURE — 99232 SBSQ HOSP IP/OBS MODERATE 35: CPT | Performed by: INTERNAL MEDICINE

## 2020-09-03 PROCEDURE — 99152 MOD SED SAME PHYS/QHP 5/>YRS: CPT | Performed by: NURSE PRACTITIONER

## 2020-09-03 PROCEDURE — C1894 INTRO/SHEATH, NON-LASER: HCPCS | Performed by: NURSE PRACTITIONER

## 2020-09-03 PROCEDURE — 99153 MOD SED SAME PHYS/QHP EA: CPT | Performed by: NURSE PRACTITIONER

## 2020-09-03 PROCEDURE — 99152 MOD SED SAME PHYS/QHP 5/>YRS: CPT | Performed by: INTERNAL MEDICINE

## 2020-09-03 RX ORDER — MAGNESIUM HYDROXIDE/ALUMINUM HYDROXICE/SIMETHICONE 120; 1200; 1200 MG/30ML; MG/30ML; MG/30ML
15 SUSPENSION ORAL EVERY 4 HOURS PRN
Status: DISCONTINUED | OUTPATIENT
Start: 2020-09-03 | End: 2020-09-03

## 2020-09-03 RX ORDER — MIDAZOLAM HYDROCHLORIDE 2 MG/2ML
INJECTION, SOLUTION INTRAMUSCULAR; INTRAVENOUS CODE/TRAUMA/SEDATION MEDICATION
Status: COMPLETED | OUTPATIENT
Start: 2020-09-03 | End: 2020-09-03

## 2020-09-03 RX ORDER — FENTANYL CITRATE 50 UG/ML
INJECTION, SOLUTION INTRAMUSCULAR; INTRAVENOUS CODE/TRAUMA/SEDATION MEDICATION
Status: COMPLETED | OUTPATIENT
Start: 2020-09-03 | End: 2020-09-03

## 2020-09-03 RX ORDER — ACETAMINOPHEN 325 MG/1
650 TABLET ORAL EVERY 4 HOURS PRN
Status: DISCONTINUED | OUTPATIENT
Start: 2020-09-03 | End: 2020-09-04 | Stop reason: HOSPADM

## 2020-09-03 RX ORDER — SODIUM CHLORIDE 9 MG/ML
75 INJECTION, SOLUTION INTRAVENOUS CONTINUOUS
Status: DISPENSED | OUTPATIENT
Start: 2020-09-03 | End: 2020-09-03

## 2020-09-03 RX ORDER — CLOPIDOGREL BISULFATE 75 MG/1
75 TABLET ORAL DAILY
Status: DISCONTINUED | OUTPATIENT
Start: 2020-09-04 | End: 2020-09-04

## 2020-09-03 RX ORDER — CALCIUM CARBONATE 200(500)MG
500 TABLET,CHEWABLE ORAL DAILY PRN
Status: DISCONTINUED | OUTPATIENT
Start: 2020-09-03 | End: 2020-09-04 | Stop reason: HOSPADM

## 2020-09-03 RX ORDER — LIDOCAINE HYDROCHLORIDE 10 MG/ML
INJECTION, SOLUTION EPIDURAL; INFILTRATION; INTRACAUDAL; PERINEURAL CODE/TRAUMA/SEDATION MEDICATION
Status: COMPLETED | OUTPATIENT
Start: 2020-09-03 | End: 2020-09-03

## 2020-09-03 RX ORDER — OXYCODONE HYDROCHLORIDE AND ACETAMINOPHEN 5; 325 MG/1; MG/1
1 TABLET ORAL EVERY 4 HOURS PRN
Status: DISCONTINUED | OUTPATIENT
Start: 2020-09-03 | End: 2020-09-04 | Stop reason: HOSPADM

## 2020-09-03 RX ORDER — NITROGLYCERIN 20 MG/100ML
INJECTION INTRAVENOUS CODE/TRAUMA/SEDATION MEDICATION
Status: COMPLETED | OUTPATIENT
Start: 2020-09-03 | End: 2020-09-03

## 2020-09-03 RX ORDER — VERAPAMIL HYDROCHLORIDE 2.5 MG/ML
INJECTION, SOLUTION INTRAVENOUS CODE/TRAUMA/SEDATION MEDICATION
Status: COMPLETED | OUTPATIENT
Start: 2020-09-03 | End: 2020-09-03

## 2020-09-03 RX ORDER — HEPARIN SODIUM 1000 [USP'U]/ML
INJECTION, SOLUTION INTRAVENOUS; SUBCUTANEOUS CODE/TRAUMA/SEDATION MEDICATION
Status: COMPLETED | OUTPATIENT
Start: 2020-09-03 | End: 2020-09-03

## 2020-09-03 RX ADMIN — MIDAZOLAM 1 MG: 1 INJECTION INTRAMUSCULAR; INTRAVENOUS at 11:10

## 2020-09-03 RX ADMIN — NITROGLYCERIN 200 MCG: 20 INJECTION INTRAVENOUS at 11:10

## 2020-09-03 RX ADMIN — METOPROLOL TARTRATE 25 MG: 25 TABLET, FILM COATED ORAL at 08:15

## 2020-09-03 RX ADMIN — METOPROLOL TARTRATE 25 MG: 25 TABLET, FILM COATED ORAL at 14:05

## 2020-09-03 RX ADMIN — PANTOPRAZOLE SODIUM 40 MG: 40 TABLET, DELAYED RELEASE ORAL at 05:10

## 2020-09-03 RX ADMIN — FENTANYL CITRATE 50 MCG: 50 INJECTION, SOLUTION INTRAMUSCULAR; INTRAVENOUS at 11:05

## 2020-09-03 RX ADMIN — METOPROLOL TARTRATE 25 MG: 25 TABLET, FILM COATED ORAL at 20:36

## 2020-09-03 RX ADMIN — NITROGLYCERIN 0.4 MG: 0.4 TABLET SUBLINGUAL at 01:18

## 2020-09-03 RX ADMIN — MIDAZOLAM 2 MG: 1 INJECTION INTRAMUSCULAR; INTRAVENOUS at 11:05

## 2020-09-03 RX ADMIN — ASPIRIN 81 MG: 81 TABLET, COATED ORAL at 08:15

## 2020-09-03 RX ADMIN — VERAPAMIL HYDROCHLORIDE 2.5 MG: 2.5 INJECTION INTRAVENOUS at 11:10

## 2020-09-03 RX ADMIN — CALCIUM CARBONATE (ANTACID) CHEW TAB 500 MG 500 MG: 500 CHEW TAB at 22:41

## 2020-09-03 RX ADMIN — SODIUM CHLORIDE 200 ML/HR: 0.9 INJECTION, SOLUTION INTRAVENOUS at 12:20

## 2020-09-03 RX ADMIN — IOHEXOL 36 ML: 350 INJECTION, SOLUTION INTRAVENOUS at 11:24

## 2020-09-03 RX ADMIN — METOPROLOL TARTRATE 25 MG: 25 TABLET, FILM COATED ORAL at 01:43

## 2020-09-03 RX ADMIN — ATORVASTATIN CALCIUM 40 MG: 40 TABLET, FILM COATED ORAL at 16:01

## 2020-09-03 RX ADMIN — HEPARIN SODIUM 4000 UNITS: 1000 INJECTION INTRAVENOUS; SUBCUTANEOUS at 11:10

## 2020-09-03 RX ADMIN — IOHEXOL 85 ML: 350 INJECTION, SOLUTION INTRAVENOUS at 11:22

## 2020-09-03 RX ADMIN — LIDOCAINE HYDROCHLORIDE 1 ML: 10 INJECTION, SOLUTION EPIDURAL; INFILTRATION; INTRACAUDAL; PERINEURAL at 11:08

## 2020-09-03 RX ADMIN — FENTANYL CITRATE 50 MCG: 50 INJECTION, SOLUTION INTRAMUSCULAR; INTRAVENOUS at 11:10

## 2020-09-03 RX ADMIN — TICAGRELOR 90 MG: 90 TABLET ORAL at 08:16

## 2020-09-03 RX ADMIN — NICOTINE 1 PATCH: 21 PATCH, EXTENDED RELEASE TRANSDERMAL at 08:16

## 2020-09-03 RX ADMIN — AMLODIPINE BESYLATE 5 MG: 5 TABLET ORAL at 08:15

## 2020-09-03 RX ADMIN — NITROGLYCERIN 0.4 MG: 0.4 TABLET SUBLINGUAL at 22:22

## 2020-09-03 NOTE — ASSESSMENT & PLAN NOTE
Initially presented to [facility] with epigastric discomfort; found to have elevated troponins with peak of 10; was unclear NSTEMI type 1 vs non-MI troponin elevation 2/2 accelerated HTN at this time  Echo showed wall motion abnormality, patient was transferred to [facility] to undergo cardiac catheterization  · Cardiology following, input appreciated  · Cath done showed RDA-- a 90 % stenosis in the distal third of the vessel segment  This lesion is a likely culprit for the patient's recent myocardial infarction  It does not appear amenable to intervention   Aggressive medical management recommended   · Continue IV heparin, aspirin, statin, beta-blocker and Brilinta  · Continue telemetry

## 2020-09-03 NOTE — PROGRESS NOTES
Progress Note - Madelyn Pal 1980, 38 y o  male MRN: [id number]    Unit/Bed#: -01 Encounter: [id number]    Primary Care Provider: Dangelo Ledbetter MD   Date and time admitted to hospital: 9/2/2020  3:11 PM    * Type 1 non-ST elevation myocardial infarction (NSTEMI) [facility])  Assessment & Plan  Initially presented to [place] with epigastric discomfort; found to have elevated troponins with peak of 10; was unclear NSTEMI type 1 vs non-MI troponin elevation 2/2 accelerated HTN at this time  Echo showed wall motion abnormality, patient was transferred to [facility] to undergo cardiac catheterization  · Cardiology following, input appreciated  · Cath done showed RDA-- a 90 % stenosis in the distal third of the vessel segment  This lesion is a likely culprit for the patient's recent myocardial infarction  It does not appear amenable to intervention  Aggressive medical management recommended   · Continue IV heparin, aspirin, statin, beta-blocker and Brilinta  · Continue telemetry       Chronic kidney disease, stage 3 (HCC)  Assessment & Plan  Unclear baseline; patient presented with creatinine of 1 51 to UB  · Improved today to 1 19 with IVF  · Monitor BMP closely      Hypertensive urgency  Assessment & Plan  Noted at [place], not on any medications prior to arrival; appears noncompliant in terms of medical f/u  · BP better controlled on metoprolol and norvasc  · Appreciate cardiology input     HLD (hyperlipidemia)  Assessment & Plan  · Total cholesterol 196, ; started on statin therapy    Obese  Assessment & Plan  Body mass index is 32 08 kg/m²    · Reports history of snoring and apneic episodes - outpatient sleep study strongly recommended     Tobacco abuse  Assessment & Plan  Tobacco cessation discussed  · Nicotine replacement while inpatient    GERD (gastroesophageal reflux disease)  Assessment & Plan  · Continue pantoprazole    VTE Pharmacologic Prophylaxis:   Pharmacologic: Heparin Drip  Mechanical VTE Prophylaxis in Place: Yes    Patient Centered Rounds: I have performed bedside rounds with nursing staff today  Discussions with Specialists or Other Care Team Provider: appreciate cards input  Education and Discussions with Family / Patient: Patient  Offered to call family, declined  Time Spent for Care: 30 minutes  More than 50% of total time spent on counseling and coordination of care as described above  Current Length of Stay: 1 day(s)    Current Patient Status: Inpatient   Certification Statement: The patient will continue to require additional inpatient hospital stay due to medical management post cath     Discharge Plan: would assume if cleared from cardiology standpoint could be discharged tomorrow  Code Status: Level 1 - Full Code      Subjective:   Doing well but reports fatigue post cath  No CP, SOB  Objective:     Vitals:   Temp (24hrs), Av 8 °F (36 6 °C), Min:97 3 °F (36 3 °C), Max:98 1 °F (36 7 °C)    Temp:  [97 3 °F (36 3 °C)-98 1 °F (36 7 °C)] 97 7 °F (36 5 °C)  HR:  [59-70] 64  Resp:  [17-20] 18  BP: (134-165)/() 144/81  SpO2:  [96 %-99 %] 97 %  Body mass index is 32 08 kg/m²  Input and Output Summary (last 24 hours): Intake/Output Summary (Last 24 hours) at 9/3/2020 1141  Last data filed at 9/3/2020 1030  Gross per 24 hour   Intake 1450 33 ml   Output 975 ml   Net 475 33 ml       Physical Exam:     Physical Exam  Vitals signs and nursing note reviewed  Cardiovascular:      Rate and Rhythm: Normal rate and regular rhythm  Pulmonary:      Effort: No respiratory distress  Abdominal:      General: There is no distension  Tenderness: There is no abdominal tenderness  Musculoskeletal:      Right lower leg: No edema  Left lower leg: No edema  Skin:     General: Skin is warm and dry  Neurological:      Mental Status: He is oriented to person, place, and time           Additional Data:     Labs:    Results from last 7 days   Lab Units 09/03/20  0400   WBC Thousand/uL 12 53*   HEMOGLOBIN g/dL 16 2   HEMATOCRIT % 48 1   PLATELETS Thousands/uL 254   NEUTROS PCT % 56   LYMPHS PCT % 34   MONOS PCT % 7   EOS PCT % 2     Results from last 7 days   Lab Units 09/03/20  0400  09/02/20  0452   POTASSIUM mmol/L 3 8   < > 4 0   CHLORIDE mmol/L 111*   < > 107   CO2 mmol/L 23   < > 30   BUN mg/dL 18   < > 17   CREATININE mg/dL 1 19   < > 1 38*   CALCIUM mg/dL 8 4   < > 8 5   ALK PHOS U/L  --   --  86   ALT U/L  --   --  76   AST U/L  --   --  55*    < > = values in this interval not displayed  Results from last 7 days   Lab Units 09/02/20  0452   INR  0 94       * I Have Reviewed All Lab Data Listed Above  * Additional Pertinent Lab Tests Reviewed:  All Labs Within Last 24 Hours Reviewed    Imaging:    Imaging Reports Reviewed Today Include: all  Imaging Personally Reviewed by Myself Includes:  none    Recent Cultures (last 7 days):           Last 24 Hours Medication List:   Current Facility-Administered Medications   Medication Dose Route Frequency Provider Last Rate    acetaminophen  650 mg Oral Q6H PRN Tutu Acevedo MD      amLODIPine  5 mg Oral Daily Tutu Acevedo MD      aspirin  81 mg Oral Daily Tutu Acevedo MD      atorvastatin  40 mg Oral Daily With Marie Ames MD      heparin (porcine)  3-20 Units/kg/hr (Order-Specific) Intravenous Titrated Tutu Acevedo MD Stopped (09/03/20 1100)    heparin (porcine)  2,000 Units Intravenous Q1H PRN Tutu Acevedo MD      heparin (porcine)  4,000 Units Intravenous Q1H PRN Tutu Acevedo MD      hydrALAZINE  10 mg Intravenous Q6H PRN Tutu Acevedo MD      metoprolol tartrate  25 mg Oral Q6H Tutu Acevedo MD      nicotine  1 patch Transdermal Daily Tutu Acevedo MD      nitroglycerin  0 4 mg Sublingual Q5 Min PRN Tutu Acevedo MD      ondansetron  4 mg Intravenous Q6H PRN MD Melvi Soni pantoprazole  40 mg Oral Early Morning Sherrill Allen MD      sodium chloride  50 mL/hr Intravenous Continuous TERRENCE Castellanos 50 mL/hr (09/02/20 1604)    ticagrelor  90 mg Oral Q12H [address] [facility] [address], CRNP          Today, Patient Was Seen By: Bishop Siemens, PA-C    ** Please Note: Dictation voice to text software may have been used in the creation of this document   **

## 2020-09-03 NOTE — ASSESSMENT & PLAN NOTE
Noted at [address], not on any medications prior to arrival; appears noncompliant in terms of medical f/u  · BP better controlled on metoprolol and norvasc  · Appreciate cardiology input

## 2020-09-03 NOTE — PHYSICAL THERAPY NOTE
Pt screened for PT services  Spoke w/ pt at bedside, and notes he is completely indep w/ mobility  Denies need for therapy  Will sign off    Kali Richter PT, DPT CSRS

## 2020-09-03 NOTE — ASSESSMENT & PLAN NOTE
Unclear baseline; patient presented with creatinine of 1 51 to SLUB  · Improved today to 1 19 with IVF  · Monitor BMP closely

## 2020-09-03 NOTE — UTILIZATION REVIEW
Initial Clinical Review    Admission: Date/Time/Statement:   Admission Orders (From admission, onward)     Ordered        09/02/20 1528  Inpatient Admission  Once                   Orders Placed This Encounter   Procedures    Inpatient Admission     Standing Status:   Standing     Number of Occurrences:   1     Order Specific Question:   Admitting Physician     Answer:   Get Billings     Order Specific Question:   Level of Care     Answer:   Med Surg [16]     Order Specific Question:   Estimated length of stay     Answer:   More than 2 Midnights     Order Specific Question:   Certification     Answer:   I certify that inpatient services are medically necessary for this patient for a duration of greater than two midnights  See H&P and MD Progress Notes for additional information about the patient's course of treatment  Assessment/Plan: 38year old male, presented to the ED @ [facility], Admitted, Transferred to [facility] level of care, via EMS  Admitted as Inpatient due to NSTEMI  Cardiologist at recommended stress echo in a m  And to avoid beta-blockers  Overnight patient troponins continue to trend up with peak of 10 3 and on-call cardiologist was contacted and patient was started on heparin drip  Patient remained chest pain-free throughout  This morning cardiologist recommended transferring the patient to [facility] for cardiac catheterization  2D echo reveals EF 55%, moderate hypokinesis basal mid inferior wall  Continue IV heparin gtt  Continue Brilinta, BB and statin  Consult cardio  VTE Prophylaxis: Heparin Drip  / sequential compression device     09/03/2020  Cardio: For heart cath today    09/03/2020  Cardiac Catheterization:  SUMMARY:  CORONARY CIRCULATION:  --  The coronary circulation is right dominant  --  Left main: 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    --  LAD: The vessel was medium to large sized  Angiography showed mild atherosclerosis  --  Mid LAD: There was a tubular 40 % stenosis  --  Circumflex: The vessel was large sized  Angiography showed no evidence of disease  There were two major obtuse marginals  --  RCA: The vessel was medium sized and moderately tortuous  --  Right PDA:  The vessel is small sized  There was a 90 % stenosis in the distal third of the vessel segment  This lesion is a likely culprit for the patient's recent myocardial infarction  It does not appear amenable to intervention  IMPRESSIONS:  There is significant single vessel coronary artery disease  type 1 MI    RECOMMENDATIONS:  Patient management should include adding lipid lowering therapy  Patient management should include an exercise program, smoking cessation, and aggressive risk factor modification       Triage Vitals   Temperature Pulse Respirations Blood Pressure SpO2   09/02/20 1529 09/02/20 1529 09/02/20 1529 09/02/20 1529 09/02/20 1529   98 1 °F (36 7 °C) 67 20 (!) 151/131 99 %      Temp src Heart Rate Source Patient Position - Orthostatic VS BP Location FiO2 (%)   -- -- 09/03/20 0332 09/03/20 0332 --     Lying Left arm       Pain Score       09/02/20 1518       No Pain          Wt Readings from Last 1 Encounters:   09/02/20 101 kg (223 lb 8 7 oz)     Additional Vital Signs:   Date/Time   Temp   Pulse   Resp   BP   MAP (mmHg)   SpO2   O2 Device   Patient Position - Orthostatic VS    09/03/20 1212   --   56   --   118/76   90   --   --   --    09/03/20 12:00:31   --   --   --   --   --   93 %   --   --    09/03/20 1200   --   63   --   124/82   96   --   --   --    09/03/20 11:45:11   --   75   --   129/85   100   95 %   --   --    09/03/20 07:20:20   97 7 °F (36 5 °C)   64   18   144/81   102   97 %   --   --    09/03/20 0332   --   --   --   141/84   --   --   --   Lying    09/03/20 01:41:12   --   60   17   157/100   119   97 %   --   --    09/03/20 01:17:21   97 9 °F (36 6 °C)   70   -- 158/106Abnormal     123   97 %   --   --    20 2232   97 9 °F (36 6 °C)   59   17   156/89   111   98 %   --   --    20 19:04:43   97 3 °F (36 3 °C)Abnormal     69   18   138/97   111   98 %   --   --    20 15:29:51   98 1 °F (36 7 °C)   61   --   134/96   109   99 %   --   --      2020 @ 0836  Chest X:  No acute cardiopulmonary disease       2020 @ 0811  EC, NSR    Pertinent Labs/Diagnostic Test Results:     Results from last 7 days   Lab Units 20  0400 20  1621 20  0452 20  2127 20  1316   WBC Thousand/uL 12 53* 12 14* 11 53* 13 63* 15 23*   HEMOGLOBIN g/dL 16 2 17 7* 16 7 16 7 17 2*   HEMATOCRIT % 48 1 53 5* 51 0* 50 2* 51 1*   PLATELETS Thousands/uL 254 265 245 242 267   NEUTROS ABS Thousands/µL 7 04 8 17* 5 71  --  10 68*     Results from last 7 days   Lab Units 20  0400 20  1621 20  0452 20  1316 20  0736   SODIUM mmol/L 141 140 144 137 141   POTASSIUM mmol/L 3 8 3 8 4 0 4 3 3 9   CHLORIDE mmol/L 111* 109* 107 102 104   CO2 mmol/L 23 22 30 28 29   ANION GAP mmol/L 7 9 7 7 8   BUN mg/dL 18 18 17 14 15   CREATININE mg/dL 1 19 1 29 1 38* 1 51* 1 43*   EGFR ml/min/1 73sq m 75 68 63 57 60   CALCIUM mg/dL 8 4 9 2 8 5 9 8 9 3   MAGNESIUM mg/dL  --  2 3 1 8  --   --    PHOSPHORUS mg/dL  --   --  4 7*  --   --      Results from last 7 days   Lab Units 20  0452 20  0736   AST U/L 55* 32   ALT U/L 76 77   ALK PHOS U/L 86 99   TOTAL PROTEIN g/dL 6 8 7 1   ALBUMIN g/dL 3 6 3 9   TOTAL BILIRUBIN mg/dL 0 60 0 30     Results from last 7 days   Lab Units 20  0400 20  1621 20  0452 20  1316 20  0736   GLUCOSE RANDOM mg/dL 97 90 95 109 115     Results from last 7 days   Lab Units 20  0452   HEMOGLOBIN A1C % 5 4   EAG mg/dl 108     Results from last 7 days   Lab Units 20  1621 20  1205 20  0451 20  2354 20  1956 20  1700 20  1316   TROPONIN I ng/mL 3 73* 3 99* 8 46* 10 31* 5 57* 3 33* 1 12*     Results from last 7 days   Lab Units 09/03/20  1018 09/03/20  0400 09/02/20  2226  09/02/20  0452 09/01/20  2127   PROTIME seconds  --   --   --   --  12 6 12 8   INR   --   --   --   --  0 94 0 96   PTT seconds 73* 82* 58*   < > 49* 36    < > = values in this interval not displayed  Results from last 7 days   Lab Units 09/01/20  1316   TSH 3RD GENERATON uIU/mL 1 504     Results from last 7 days   Lab Units 09/02/20  0452 09/01/20  0736   LIPASE u/L 180 232     Past Medical History:   Diagnosis Date    Chronic pain     GERD (gastroesophageal reflux disease)     Hypertension      Present on Admission:   Elevated troponin   Tobacco abuse   Hypertensive urgency   GERD (gastroesophageal reflux disease)   Chronic kidney disease, stage 3 ([facility])      Admitting Diagnosis: NSTEMI (non-ST elevated myocardial infarction) ([facility] [address] ) [I21 4]  Age/Sex: 38 y o  male  Admission Orders:  Scheduled Medications:  amLODIPine, 5 mg, Oral, Daily  aspirin, 81 mg, Oral, Daily  atorvastatin, 40 mg, Oral, Daily With Dinner  metoprolol tartrate, 25 mg, Oral, Q6H  nicotine, 1 patch, Transdermal, Daily  pantoprazole, 40 mg, Oral, Early Morning  ticagrelor, 90 mg, Oral, Q12H [address]      Continuous IV Infusions:  heparin (porcine), 3-20 Units/kg/hr (Order-Specific), Intravenous, Titrated  sodium chloride, 50 mL/hr, Intravenous, Continuous      PRN Meds:  acetaminophen, 650 mg, Oral, Q6H PRN  heparin (porcine), 2,000 Units, Intravenous, Q1H PRN  heparin (porcine), 4,000 Units, Intravenous, Q1H PRN  hydrALAZINE, 10 mg, Intravenous, Q6H PRN  nitroglycerin, 0 4 mg, Sublingual, Q5 Min PRN  ondansetron, 4 mg, Intravenous, Q6H PRN      NPO  Telemetry  Mehdi SCDs  O2 @ 2L via [place]  IP CONSULT TO CASE MANAGEMENT    Network Utilization Review Department  [email or web address]  ATTENTION: Please call with any questions or concerns to [phone] and carefully listen to the prompts so that you are directed to the right person  All voicemails are confidential   Kristie Ellis all requests for admission clinical reviews, approved or denied determinations and any other requests to dedicated fax number below belonging to the campus where the patient is receiving treatment   List of dedicated fax numbers for the Facilities:  [address] DENIALS (Administrative/Medical Necessity) [phone]   1000  [facility] (Maternity/NICU/Pediatrics) [phone]   Yuliana Mckeon [phone]   [facility] [phone]   Minnie Mayorga [phone]   Manjeet Manjarrez [phone]   [address] [place] [address] [phone]   [facility]  [phone]   [address] [facility]  [address] [facility] [address] [facility] [phone]

## 2020-09-03 NOTE — PROGRESS NOTES
General Cardiology   Progress Note -  Team One   Salvatore Bailey 38 y o  male MRN: [id number]    Unit/Bed#: -01 Encounter: [id number]    Assessment/ Plan:    1  Elevated troponin: peak 10; unclear if this is NSTEMI Type 1 or non-MI troponin elevation 2/2 to accelerated HTN  Given his echo with wall motion abnormality and trop peak of 10 he was transferred to [facility] to undergo coronary angiography which is planned for today  He remains chest pain free; continue IV heprin, ASA, statin, BB and Brilinta (got load )  Await results of cardiac cath today  2  Accelerated HTN: likely longstanding uncontrolled/untreated HTN; BP control is better on metoprolol and Norvasc  3  Dyslipidemia: total 196, ; started on statin therapy  4  GERD: continue PPI  5  CKD: baseline 1 2-1 4; Cr 1 19 today; continue IVF for cath today  6  Tobacco use: strongly advised cessation; nicotine patch available  Subjective: Pt seen for follow up; no events overnight  Reports feeling fine this morning; having some sharp epigastric pain; lasting only a few seconds; brought on by movement  No chest pain overnight  Review of Systems   Constitution: Negative for decreased appetite and fever  Cardiovascular: Negative for chest pain, dyspnea on exertion, leg swelling, orthopnea, palpitations and syncope  Respiratory: Negative for cough, shortness of breath and wheezing  Gastrointestinal: Negative for abdominal pain, nausea and vomiting  Genitourinary: Negative for dysuria  Neurological: Negative for dizziness and light-headedness  Psychiatric/Behavioral: Negative for altered mental status  All other systems reviewed and are negative  Objective:   Vitals: Blood pressure 144/81, pulse 64, temperature 97 7 °F (36 5 °C), resp  rate 18, height 5' 10" (1 778 m), weight 101 kg (223 lb 8 7 oz), SpO2 97 %  ,     Body mass index is 32 08 kg/m²  ,     Systolic (37EEL), QY , Min:134 , JWP:391     Diastolic (33TLN), Av, Min:64, Max:131      Intake/Output Summary (Last 24 hours) at 9/3/2020 0820  Last data filed at 9/3/2020 0701  Gross per 24 hour   Intake 1210 33 ml   Output 575 ml   Net 635 33 ml     Weight (last 2 days)     Date/Time   Weight    20 1518   101 (223 55)            Telemetry Review:   Sinus rhythm/sinus bradycardia; no significant events    Physical Exam  Vitals signs reviewed  Constitutional:       General: He is not in acute distress  Appearance: He is not ill-appearing  Comments: Pt laying in bed in NAD; alert pleasant and cooperative   HENT:      Head: Normocephalic  Cardiovascular:      Rate and Rhythm: Normal rate and regular rhythm  Heart sounds: Normal heart sounds, S1 normal and S2 normal  No murmur  No friction rub  Pulmonary:      Effort: Pulmonary effort is normal       Breath sounds: Normal breath sounds  No wheezing or rales  Comments: On RA  Abdominal:      Palpations: Abdomen is soft  Musculoskeletal:      Right lower leg: No edema  Left lower leg: No edema  Skin:     General: Skin is warm and dry  Neurological:      General: No focal deficit present  Mental Status: He is alert and oriented to person, place, and time     Psychiatric:         Mood and Affect: Mood normal        LABORATORY RESULTS  Results from last 7 days   Lab Units 20  1621 20  1205 20  0451   TROPONIN I ng/mL 3 73* 3 99* 8 46*     CBC with diff:   Results from last 7 days   Lab Units 20  0400 20  1621 20  0452 20  2127 20  1316 20  0736   WBC Thousand/uL 12 53* 12 14* 11 53* 13 63* 15 23* 12 77*   HEMOGLOBIN g/dL 16 2 17 7* 16 7 16 7 17 2* 17 4*   HEMATOCRIT % 48 1 53 5* 51 0* 50 2* 51 1* 52 2*   MCV fL 84 84 86 85 84 85   PLATELETS Thousands/uL 254 265 245 242 267 268   MCH pg 28 4 27 8 28 0 28 4 28 3 28 2   MCHC g/dL 33 7 33 1 32 7 33 3 33 7 33 3   RDW % 13 3 13 8 13 1 13 2 13 0 13 0   MPV fL 10 3 10 2 10 4 10 2 10 2 10 1 NRBC AUTO /100 WBCs 0 0 0  --  0 0     CMP:  Results from last 7 days   Lab Units 20  0400 20  1621 20  0452 20  1316 20  0736   POTASSIUM mmol/L 3 8 3 8 4 0 4 3 3 9   CHLORIDE mmol/L 111* 109* 107 102 104   CO2 mmol/L 23 22 30 28 29   BUN mg/dL 18 18 17 14 15   CREATININE mg/dL 1 19 1 29 1 38* 1 51* 1 43*   CALCIUM mg/dL 8 4 9 2 8 5 9 8 9 3   AST U/L  --   --  55*  --  32   ALT U/L  --   --  76  --  77   ALK PHOS U/L  --   --  86  --  99   EGFR ml/min/1 73sq m 75 68 63 57 60     BMP:  Results from last 7 days   Lab Units 20  0400 20  1621 20  0452 20  1316 20  0736   POTASSIUM mmol/L 3 8 3 8 4 0 4 3 3 9   CHLORIDE mmol/L 111* 109* 107 102 104   CO2 mmol/L 23 22 30 28 29   BUN mg/dL 18 18 17 14 15   CREATININE mg/dL 1 19 1 29 1 38* 1 51* 1 43*   CALCIUM mg/dL 8 4 9 2 8 5 9 8 9 3      Results from last 7 days   Lab Units 20  1621 20  0452   MAGNESIUM mg/dL 2 3 1 8     Results from last 7 days   Lab Units 20  0452   HEMOGLOBIN A1C % 5 4     Results from last 7 days   Lab Units 20  1316   TSH 3RD GENERATON uIU/mL 1 504     Results from last 7 days   Lab Units 20  0452 20  2127   INR  0 94 0 96     Lipid Profile:   No results found for: CHOL  Lab Results   Component Value Date    HDL 24 (L) 2020     Lab Results   Component Value Date    LDLCALC 128 (H) 2020     Lab Results   Component Value Date    TRIG 222 (H) 2020     Cardiac testing:   Results for orders placed during the hospital encounter of 20   Echo complete with contrast if indicated    Narrative [facility] [address]  [address] [place] [address]    Transthoracic Echocardiogram  2D, M-mode, Doppler, and Color Doppler    Study date:  02-Sep-2020    Patient: Edenilson Hui  MR number: [id number]  Account number: [de-identified]  : 1980  Age: 38 years  Gender: Male  Status: Inpatient  Location: [facility]  Height: 70 in  Weight: 224 6 lb  BP: 159/ 118 mmHg    Indications: Chest pain    Diagnoses: R07 9 - Chest pain, unspecified    Sonographer:  CHARLES Wiseman  Primary Physician:  Reese Mohamud MD  Referring Physician:  TERRENCE Padron  Group:  [facility]  Interpreting Physician:  Domingo Otero MD    SUMMARY    LEFT VENTRICLE:  Size was normal   Systolic function was normal  Ejection fraction was estimated to be 55 %  There was moderate hypokinesis of the basal-mid inferior wall  Wall thickness was mildly increased  Doppler parameters were consistent with abnormal left ventricular relaxation (grade 1 diastolic dysfunction)  RIGHT VENTRICLE:  The size was normal   Systolic function was normal     TRICUSPID VALVE:  There was trace regurgitation  HISTORY: PRIOR HISTORY: MI; HTN; GERD; Smoker; CKD 3; Chronic pain    PROCEDURE: The study was performed in the [address] [place] [address]  This was a routine study  The transthoracic approach was used  The study included complete 2D imaging, M-mode, complete spectral Doppler, and color Doppler  Echocardiographic  views were limited due to decreased penetration and lung interference  This was a technically difficult study  LEFT VENTRICLE: Size was normal  Systolic function was normal  Ejection fraction was estimated to be 55 %  There were no regional wall motion abnormalities  There was moderate hypokinesis of the basal-mid inferior wall  Wall thickness was  mildly increased  DOPPLER: Doppler parameters were consistent with abnormal left ventricular relaxation (grade 1 diastolic dysfunction)  RIGHT VENTRICLE: The size was normal  Systolic function was normal  Wall thickness was normal     LEFT ATRIUM: Size was normal     RIGHT ATRIUM: Size was normal     MITRAL VALVE: Valve structure was normal  There was normal leaflet separation  DOPPLER: The transmitral velocity was within the normal range  There was no evidence for stenosis  There was no regurgitation  AORTIC VALVE: The valve was trileaflet  Leaflets exhibited normal thickness and normal cuspal separation  DOPPLER: Transaortic velocity was within the normal range  There was no evidence for stenosis  There was no regurgitation  TRICUSPID VALVE: The valve structure was normal  There was normal leaflet separation  DOPPLER: The transtricuspid velocity was within the normal range  There was no evidence for stenosis  There was trace regurgitation  The tricuspid jet  envelope definition was inadequate for estimation of RV systolic pressure  There are no indirect findings suggestive of moderate or severe pulmonary hypertension  PULMONIC VALVE: Leaflets exhibited normal thickness, no calcification, and normal cuspal separation  DOPPLER: The transpulmonic velocity was within the normal range  There was no regurgitation  PERICARDIUM: There was no pericardial effusion  The pericardium was normal in appearance  AORTA: The root exhibited normal size  SYSTEMIC VEINS: IVC: The inferior vena cava was not well visualized      SYSTEM MEASUREMENT TABLES    2D  %FS: 37 9 %  AV Diam: 3 06 cm  EDV(Teich): 140 42 ml  EF Biplane: 46 65 %  EF(Teich): 67 54 %  ESV(Teich): 45 58 ml  IVSd: 1 03 cm  LA Area: 17 68 cm2  LA Diam: 4 2 cm  LVEDV MOD A2C: 102 51 ml  LVEDV MOD A4C: 115 51 ml  LVEDV MOD BP: 113 43 ml  LVEF MOD A2C: 50 6 %  LVEF MOD A4C: 40 28 %  LVESV MOD A2C: 50 64 ml  LVESV MOD A4C: 68 99 ml  LVESV MOD BP: 60 52 ml  LVIDd: 5 39 cm  LVIDs: 3 34 cm  LVLd A2C: 8 42 cm  LVLd A4C: 7 61 cm  LVLs A2C: 6 95 cm  LVLs A4C: 7 35 cm  LVPWd: 1 04 cm  RA Area: 13 53 cm2  RVIDd: 3 15 cm  SV MOD A2C: 51 87 ml  SV MOD A4C: 46 52 ml  SV(Teich): 94 84 ml    CW  TR Vmax: 2 31 m/s  TR maxP 32 mmHg    MM  TAPSE: 1 62 cm    PW  E': 0 05 m/s  E/E': 11 01  MV A Ayaan: 0 67 m/s  MV Dec [place]: 2 28 m/s2  MV DecT: 235 02 ms  MV E Ayaan: 0 54 m/s  MV E/A Ratio: 0 8  MV PHT: 68 16 ms  MVA By PHT: 3 23 cm2    [address] Accredited Echocardiography Laboratory    Prepared and electronically signed by    Shaggy Champion MD  Signed 02-Sep-2020 15:18:04       Meds/Allergies   all current active meds have been reviewed  No medications prior to admission  heparin (porcine), 3-20 Units/kg/hr (Order-Specific), Last Rate: 17 1 Units/kg/hr (09/02/20 2312)  sodium chloride, 50 mL/hr, Last Rate: 50 mL/hr (09/02/20 1604)      Assessment:  Principal Problem:    Type 2 MI (myocardial infarction) ([facility] [address] )  Active Problems:    Hypertensive urgency    GERD (gastroesophageal reflux disease)    Tobacco abuse    Chronic kidney disease, stage 3 (HCC)    Obese    Counseling / Coordination of Care  Total floor / unit time spent today 20 minutes  Greater than 50% of total time was spent with the patient and / or family counseling and / or coordination of care  ** Please Note: Dragon 360 Dictation voice to text software may have been used in the creation of this document   **

## 2020-09-03 NOTE — ASSESSMENT & PLAN NOTE
Body mass index is 32 08 kg/m²    · Reports history of snoring and apneic episodes - outpatient sleep study strongly recommended

## 2020-09-04 VITALS
TEMPERATURE: 97.8 F | SYSTOLIC BLOOD PRESSURE: 157 MMHG | HEART RATE: 63 BPM | RESPIRATION RATE: 18 BRPM | WEIGHT: 223.55 LBS | HEIGHT: 70 IN | BODY MASS INDEX: 32 KG/M2 | DIASTOLIC BLOOD PRESSURE: 98 MMHG | OXYGEN SATURATION: 96 %

## 2020-09-04 LAB
ANION GAP SERPL CALCULATED.3IONS-SCNC: 8 MMOL/L (ref 4–13)
ATRIAL RATE: 65 BPM
BASOPHILS # BLD AUTO: 0.06 THOUSANDS/ΜL (ref 0–0.1)
BASOPHILS NFR BLD AUTO: 1 % (ref 0–1)
BUN SERPL-MCNC: 15 MG/DL (ref 5–25)
CALCIUM SERPL-MCNC: 9.3 MG/DL (ref 8.3–10.1)
CHLORIDE SERPL-SCNC: 110 MMOL/L (ref 100–108)
CO2 SERPL-SCNC: 23 MMOL/L (ref 21–32)
CREAT SERPL-MCNC: 1.21 MG/DL (ref 0.6–1.3)
EOSINOPHIL # BLD AUTO: 0.27 THOUSAND/ΜL (ref 0–0.61)
EOSINOPHIL NFR BLD AUTO: 2 % (ref 0–6)
ERYTHROCYTE [DISTWIDTH] IN BLOOD BY AUTOMATED COUNT: 13.3 % (ref 11.6–15.1)
GFR SERPL CREATININE-BSD FRML MDRD: 74 ML/MIN/1.73SQ M
GLUCOSE SERPL-MCNC: 95 MG/DL (ref 65–140)
HCT VFR BLD AUTO: 50.1 % (ref 36.5–49.3)
HGB BLD-MCNC: 16.4 G/DL (ref 12–17)
IMM GRANULOCYTES # BLD AUTO: 0.06 THOUSAND/UL (ref 0–0.2)
IMM GRANULOCYTES NFR BLD AUTO: 1 % (ref 0–2)
LYMPHOCYTES # BLD AUTO: 3.39 THOUSANDS/ΜL (ref 0.6–4.47)
LYMPHOCYTES NFR BLD AUTO: 26 % (ref 14–44)
MCH RBC QN AUTO: 27.8 PG (ref 26.8–34.3)
MCHC RBC AUTO-ENTMCNC: 32.7 G/DL (ref 31.4–37.4)
MCV RBC AUTO: 85 FL (ref 82–98)
MONOCYTES # BLD AUTO: 1.05 THOUSAND/ΜL (ref 0.17–1.22)
MONOCYTES NFR BLD AUTO: 8 % (ref 4–12)
NEUTROPHILS # BLD AUTO: 8.01 THOUSANDS/ΜL (ref 1.85–7.62)
NEUTS SEG NFR BLD AUTO: 62 % (ref 43–75)
NRBC BLD AUTO-RTO: 0 /100 WBCS
P AXIS: 48 DEGREES
PLATELET # BLD AUTO: 253 THOUSANDS/UL (ref 149–390)
PMV BLD AUTO: 10.5 FL (ref 8.9–12.7)
POTASSIUM SERPL-SCNC: 3.7 MMOL/L (ref 3.5–5.3)
PR INTERVAL: 160 MS
QRS AXIS: 21 DEGREES
QRSD INTERVAL: 90 MS
QT INTERVAL: 418 MS
QTC INTERVAL: 434 MS
RBC # BLD AUTO: 5.9 MILLION/UL (ref 3.88–5.62)
SODIUM SERPL-SCNC: 141 MMOL/L (ref 136–145)
T WAVE AXIS: 80 DEGREES
VENTRICULAR RATE: 65 BPM
WBC # BLD AUTO: 12.84 THOUSAND/UL (ref 4.31–10.16)

## 2020-09-04 PROCEDURE — 99233 SBSQ HOSP IP/OBS HIGH 50: CPT | Performed by: INTERNAL MEDICINE

## 2020-09-04 PROCEDURE — 93010 ELECTROCARDIOGRAM REPORT: CPT | Performed by: INTERNAL MEDICINE

## 2020-09-04 PROCEDURE — 99239 HOSP IP/OBS DSCHRG MGMT >30: CPT | Performed by: PHYSICIAN ASSISTANT

## 2020-09-04 PROCEDURE — 80048 BASIC METABOLIC PNL TOTAL CA: CPT | Performed by: INTERNAL MEDICINE

## 2020-09-04 PROCEDURE — 85025 COMPLETE CBC W/AUTO DIFF WBC: CPT | Performed by: INTERNAL MEDICINE

## 2020-09-04 RX ORDER — LOSARTAN POTASSIUM 50 MG/1
50 TABLET ORAL DAILY
Status: DISCONTINUED | OUTPATIENT
Start: 2020-09-04 | End: 2020-09-04 | Stop reason: HOSPADM

## 2020-09-04 RX ORDER — CLOPIDOGREL BISULFATE 75 MG/1
300 TABLET ORAL ONCE
Status: COMPLETED | OUTPATIENT
Start: 2020-09-04 | End: 2020-09-04

## 2020-09-04 RX ORDER — LOSARTAN POTASSIUM 50 MG/1
50 TABLET ORAL DAILY
Qty: 30 TABLET | Refills: 0 | Status: SHIPPED | OUTPATIENT
Start: 2020-09-05 | End: 2020-09-29 | Stop reason: SDUPTHER

## 2020-09-04 RX ORDER — ATORVASTATIN CALCIUM 40 MG/1
40 TABLET, FILM COATED ORAL
Qty: 30 TABLET | Refills: 0 | Status: SHIPPED | OUTPATIENT
Start: 2020-09-04 | End: 2020-09-29 | Stop reason: SDUPTHER

## 2020-09-04 RX ORDER — AMLODIPINE BESYLATE 5 MG/1
5 TABLET ORAL DAILY
Qty: 30 TABLET | Refills: 0 | Status: SHIPPED | OUTPATIENT
Start: 2020-09-04 | End: 2020-09-29 | Stop reason: SDUPTHER

## 2020-09-04 RX ORDER — CLOPIDOGREL BISULFATE 75 MG/1
75 TABLET ORAL DAILY
Status: DISCONTINUED | OUTPATIENT
Start: 2020-09-05 | End: 2020-09-04 | Stop reason: HOSPADM

## 2020-09-04 RX ORDER — PANTOPRAZOLE SODIUM 40 MG/1
40 TABLET, DELAYED RELEASE ORAL
Qty: 30 TABLET | Refills: 0 | Status: SHIPPED | OUTPATIENT
Start: 2020-09-05 | End: 2020-09-29 | Stop reason: SDUPTHER

## 2020-09-04 RX ORDER — ASPIRIN 81 MG/1
81 TABLET ORAL DAILY
Qty: 30 TABLET | Refills: 0 | Status: SHIPPED | OUTPATIENT
Start: 2020-09-04

## 2020-09-04 RX ORDER — CLOPIDOGREL BISULFATE 75 MG/1
75 TABLET ORAL DAILY
Qty: 30 TABLET | Refills: 0 | Status: SHIPPED | OUTPATIENT
Start: 2020-09-05 | End: 2020-09-29 | Stop reason: SDUPTHER

## 2020-09-04 RX ORDER — NITROGLYCERIN 0.4 MG/1
0.4 TABLET SUBLINGUAL
Qty: 20 TABLET | Refills: 0 | Status: SHIPPED | OUTPATIENT
Start: 2020-09-04

## 2020-09-04 RX ORDER — POTASSIUM CHLORIDE 20 MEQ/1
40 TABLET, EXTENDED RELEASE ORAL ONCE
Status: COMPLETED | OUTPATIENT
Start: 2020-09-04 | End: 2020-09-04

## 2020-09-04 RX ADMIN — METOPROLOL TARTRATE 25 MG: 25 TABLET, FILM COATED ORAL at 02:41

## 2020-09-04 RX ADMIN — LOSARTAN POTASSIUM 50 MG: 50 TABLET, FILM COATED ORAL at 09:06

## 2020-09-04 RX ADMIN — POTASSIUM CHLORIDE 40 MEQ: 1500 TABLET, EXTENDED RELEASE ORAL at 09:07

## 2020-09-04 RX ADMIN — AMLODIPINE BESYLATE 5 MG: 5 TABLET ORAL at 09:06

## 2020-09-04 RX ADMIN — ASPIRIN 81 MG: 81 TABLET, COATED ORAL at 09:06

## 2020-09-04 RX ADMIN — NICOTINE 1 PATCH: 21 PATCH, EXTENDED RELEASE TRANSDERMAL at 09:08

## 2020-09-04 RX ADMIN — CLOPIDOGREL BISULFATE 300 MG: 75 TABLET ORAL at 09:06

## 2020-09-04 RX ADMIN — PANTOPRAZOLE SODIUM 40 MG: 40 TABLET, DELAYED RELEASE ORAL at 05:12

## 2020-09-04 NOTE — ASSESSMENT & PLAN NOTE
· Noted at [address], not on any medications prior to arrival; appears noncompliant in terms of medical f/u  · BP better controlled on metoprolol and norvasc  · Continue at discharge  · Continue losartan 50mg  · Follow up with cardiology and primary care

## 2020-09-04 NOTE — DISCHARGE INSTR - AVS FIRST PAGE
Dear Salvatore Bailey,     It was our pleasure to care for you here at [facility], [address] [facility]  It is our hope that we were always able to exceed the expected standards for your care during your stay  You were hospitalized due to chest pain  You were cared for on the 5th floor by Mike Coronel PA-C under the service of Hever Flores MD with the [facility] who covers for your primary care physician (PCP), Jaylin Mcfarlane MD, while you were hospitalized  If you have any questions or concerns related to this hospitalization, you may contact us at [phone]  For follow up as well as any medication refills, we recommend that you follow up with your primary care physician  A registered nurse will reach out to you by phone within a few days after your discharge to answer any additional questions that you may have after going home  However, at this time we provide for you here, the most important instructions / recommendations at discharge:     · Notable Medication Adjustments -   · Aspirin   · Norvasc  · Metoprolol   · Plavix   · Losartan  · Pantoprazole   · Testing Required after Discharge -   · Sleep study   · Important follow up information -   · Follow up with cardiology  · Follow up with PCP within 1 week   · Other Instructions -   · Return if chest pain becomes unbearable, you develop shortness of breath or if symptoms return or worsen  · Please review this entire after visit summary as additional general instructions including medication list, appointments, activity, diet, any pertinent wound care, and other additional recommendations from your care team that may be provided for you        Sincerely,     Mike Coronel PA-C

## 2020-09-04 NOTE — ASSESSMENT & PLAN NOTE
· Unclear baseline; patient presented with creatinine of 1 51 to SLUB  · Improved to 1 21 at discharge   · Continue to follow with primary care

## 2020-09-04 NOTE — DISCHARGE SUMMARY
[facility]  Discharge- Jt Fitting 1980, 38 y o  male MRN: [id number]  Unit/Bed#: -01 Encounter: [id number]  Primary Care Provider: Maite Davila MD   Date and time admitted to hospital: 9/2/2020  3:11 PM    HLD (hyperlipidemia)  Assessment & Plan  · Total cholesterol 196, ; started on statin therapy  · Continue atorvastatin 40mg  · Follow up with primary care     Obese  Assessment & Plan  · Body mass index is 32 08 kg/m²  · Reports history of snoring and apneic episodes   · Outpatient sleep study ordered for discharge    Chronic kidney disease, stage 3 ([facility] [address] )  Assessment & Plan  · Unclear baseline; patient presented with creatinine of 1 51 to [facility]  · Improved to 1 21 at discharge   · Continue to follow with primary care      Tobacco abuse  Assessment & Plan  · Tobacco cessation discussed      GERD (gastroesophageal reflux disease)  Assessment & Plan  · Continue pantoprazole    Hypertensive urgency  Assessment & Plan  · Noted at [address], not on any medications prior to arrival; appears noncompliant in terms of medical f/u  · BP better controlled on metoprolol and norvasc  · Continue at discharge  · Continue losartan 50mg  · Follow up with cardiology and primary care     * Type 1 non-ST elevation myocardial infarction (NSTEMI) ([facility] [address] )  Assessment & Plan  · Initially presented to [facility] with epigastric discomfort; found to have elevated troponins with peak of 10; was unclear NSTEMI type 1 vs non-MI troponin elevation 2/2 accelerated HTN at this time  Echo showed wall motion abnormality, patient was transferred to [facility] to undergo cardiac catheterization  · Cardiology consulted, input appreciated  · Cath done showed RDA-- a 90 % stenosis in the distal third of the vessel segment  This lesion is a likely culprit for the patient's recent myocardial infarction  It does not appear amenable to intervention   Aggressive medical management recommended   · Continue aspirin, statin, beta-blocker and Plavix at discharge  · Follow up with cardiology outpatient           Discharging Physician / Practitioner: Mike Coronel PA-C  PCP: Jaylin Mcfarlane MD  Admission Date:   Admission Orders (From admission, onward)     Ordered        09/02/20 1528  Inpatient Admission  Once                   Discharge Date: 09/04/20    Resolved Problems  Date Reviewed: 9/4/2020    None          Consultations During Hospital Stay:  · Cardiology     Procedures Performed:   · Cardiac catheretization 9/3: There is significant single vessel coronary artery disease  type 1 MI    Significant Findings / Test Results:   · 0725 9/1: ECG  · Normal sinus rhythm with sinus arrhythmia  · Normal ECG  · When compared with ECG of 26-SEP-2019 22:56,  · No significant change was found  · 1034 9/1: ECG  · No significant change found from previous ECG  · 1311 9/1: ECG  · Sinus tachycardia  · No significant change from previous ECG  · 1703 9/1 ECG:  · Normal sinus rhythm  · Possible inferior infarct  · Abnormal ECG  · Nonspecific T wave abnormality worse in anterolateral leads  · 2340 9/1 ECG:  · Sinus rhythm with occasional Premature ventricular complexes  · Cannot rule out Inferior infarct (cited on or before 01-SEP-2020)  · Abnormal ECG  · Premature ventricular complexes are now present  · 8011 9/2 ECG:  · Normal sinus rhythm  · Cannot rule out Inferior infarct (cited on or before 01-SEP-2020)  · Abnormal ECG  · Premature ventricular complexes are no longer Present  · 9/2 Echocardiogram:  · LEFT VENTRICLE:  · Size was normal   · Systolic function was normal  Ejection fraction was estimated to be 55 %  · There was moderate hypokinesis of the basal-mid inferior wall  · Wall thickness was mildly increased  · Doppler parameters were consistent with abnormal left ventricular relaxation (grade 1 diastolic dysfunction)    · RIGHT VENTRICLE:  · The size was normal   · Systolic function was normal   · TRICUSPID VALVE:  · There was trace regurgitation  · 0115 9/3 ECG:  · Normal sinus rhythm with sinus arrhythmia  · Possible Inferior infarct (cited on or before 01-SEP-2020)  · Abnormal ECG  · When compared with ECG of 02-SEP-2020 08:11, (unconfirmed)  · No significant change was found  · 9/3 Cardiac catheterization:  · CORONARY CIRCULATION:  · Right PDA: There was a 90 % stenosis in the distal third of the vessel segment  This lesion is a likely culprit for the patient's recent myocardial infarction  It does not appear amenable to intervention    · CARDIAC STRUCTURES:  · Analysis of regional contractile function demonstrated mild diaphragmatic hypokinesis  (distal/apical)  · Global left ventricular function was normal  EF calculated by contrast ventriculography was 60 %  · 1311 9/3 ECG:  · Sinus tachycardia  · Otherwise normal ECG  · 2229 9/3 ECG  · Normal sinus rhythm  · Nonspecific T wave abnormality  · Abnormal ECG  · No significant change found       Incidental Findings:   · None    Test Results Pending at Discharge (will require follow up): · None     Outpatient Tests Requested:  · Sleep study     Complications:  None    Reason for Admission: epigastric pain, chest pain    Hospital Course:     Sisi Minaya is a 38 y o  male patient with no reported past medical history, an active smoker who originally presented to the hospital on 9/2/2020 from transfer at [facility] for cardiac catheterization  He initially presented to [facility] with epigastric pain, nausea and reflux-like symptoms  Was admitted for hypertensive urgency and noted to have elevated troponin with peak 10  On 2D echo had EF of 55% with hypokinesis basal mid inferior walls which lead to transfer to [place]  Was started on Brilinta, metoprolol, atorvastatin, IV heparin drip and nitroglycerin as needed  Following with serial ECGs as described above, on telemetry  Started on Norvasc and hydralazine p r n  for HTN and IV fluids for mild JAM  Cardiology consulted      9/2 cardiology recommends continuing current treatment plan, BP better controlled with the Norvasc and metoprolol  9/3 Cardiac cath results described above  Does not appear amenable to intervention, aggressive medical management recommended  PT cleared for discharge  9/4 cardiology discussed outpatient follow up, changed Lynette Raza to Plavix, and discussed adherence to medications and importance of smoking cessation  Prescriptions written, cardiology consult placed and outpatient sleep study placed  Patient agreeable to plan  Will also give referral to Cardiac Rehab  Please see above list of diagnoses and related plan for additional information  Condition at Discharge: stable     Discharge Day Visit / Exam:     Subjective: He is feeling better today  He offers no complaints or concerns about his discharge plans  Discussed concerns with cardiology and is agreeable to plan  Vitals: Blood Pressure: 157/98 (09/04/20 0828)  Pulse: 63 (09/04/20 0828)  Temperature: 97 8 °F (36 6 °C) (09/04/20 0828)  Temp Source: Oral (09/03/20 1902)  Respirations: 18 (09/04/20 0828)  Height: 5' 10" (177 8 cm) (09/02/20 1518)  Weight - Scale: 101 kg (223 lb 8 7 oz) (09/02/20 1518)  SpO2: 96 % (09/04/20 0828)  Exam:   Physical Exam  Constitutional:       General: He is not in acute distress  Appearance: Normal appearance  He is well-developed  HENT:      Head: Normocephalic and atraumatic  Eyes:      General: No scleral icterus  Conjunctiva/sclera: Conjunctivae normal    Cardiovascular:      Rate and Rhythm: Normal rate and regular rhythm  Heart sounds: No murmur  Pulmonary:      Effort: Pulmonary effort is normal       Breath sounds: No wheezing, rhonchi or rales  Abdominal:      General: There is no distension  Palpations: Abdomen is soft  Skin:     General: Skin is warm and dry  Neurological:      General: No focal deficit present  Mental Status: He is alert     Psychiatric:         Mood and Affect: Mood normal          Discussion with Family: Discussed with patient at bedside who will update family himself  Discharge instructions/Information to patient and family:   See after visit summary for information provided to patient and family  Provisions for Follow-Up Care:  See after visit summary for information related to follow-up care and any pertinent home health orders  Disposition:     Home    For Discharges to [facility] SNF:   · Not Applicable to this Patient - Not Applicable to this Patient    Planned Readmission: None     Discharge Statement:  I spent 60 minutes discharging the patient  This time was spent on the day of discharge  I had direct contact with the patient on the day of discharge  Greater than 50% of the total time was spent examining patient, answering all patient questions, arranging and discussing plan of care with patient as well as directly providing post-discharge instructions  Additional time then spent on discharge activities  Discharge Medications:  See after visit summary for reconciled discharge medications provided to patient and family        ** Please Note: This note has been constructed using a voice recognition system **

## 2020-09-04 NOTE — DISCHARGE INSTRUCTIONS
1  Please see the post cardiac catheterization/ angioseal closure device instructions and stent booklet  No heavy lifting, greater than 10 lbs  or strenuous  activity for 48 hrs  See the post cardiac catheterization/ angioseal closure device instructions  2 Remove band aid tomorrow  Shower and wash area- wrist gently with soap and water- beginning tomorrow  Rinse and pat dry  Apply new water seal band aid  Repeat this process for 5 days  No powders, creams lotions or antibiotic ointments  for 5 days  No tub baths, hot tubs or swimming for 5 days  3 No driving for 3 days    4  Do not stop aspirin or plavix (clopidigrel) for any reason without a cardiologists consent, or the stent could block up and cause a heart attack

## 2020-09-04 NOTE — PROGRESS NOTES
Cardiology Progress Note - Renita Alcalar 38 y o  male MRN: [id number]    Unit/Bed#: -01 Encounter: [id number]      Assessment:  Principal Problem:    Type 1 non-ST elevation myocardial infarction (NSTEMI) ([facility])  Active Problems:    Hypertensive urgency    GERD (gastroesophageal reflux disease)    Tobacco abuse    Chronic kidney disease, stage 3 (HCC)    Obese    HLD (hyperlipidemia)      Plan:  Patient is comfortable this morning  He has no chest pain or significant dyspnea  Sinus bradycardia noted on telemetry  Will reduce dose of metoprolol to q 12h  Okay for discharge from cardiac point of view  Will continue dual anti-platelet therapy for a month and then just aspirin 81 mg per day  Will add losartan 50 mg per day to current regimen to better control blood pressure  Would continue statin  Smoking cessation was advised  Will need follow-up with Cardiology  BMP today with potassium of 3 7 and creatinine of 1 21  Will supplement potassium  Subjective:   Patient seen and examined  No significant events overnight   negative  Objective:     Vitals: Blood pressure 157/98, pulse 63, temperature 97 8 °F (36 6 °C), resp  rate 18, height 5' 10" (1 778 m), weight 101 kg (223 lb 8 7 oz), SpO2 96 %  , Body mass index is 32 08 kg/m² ,   Orthostatic Blood Pressures      Most Recent Value   Blood Pressure  157/98 filed at 09/04/2020 4703   Patient Position - Orthostatic VS  Lying filed at 09/03/2020 2228      ,      Intake/Output Summary (Last 24 hours) at 9/4/2020 0843  Last data filed at 9/3/2020 1902  Gross per 24 hour   Intake 1324 68 ml   Output 1200 ml   Net 124 68 ml       No significant arrhythmias seen on telemetry review    Sinus bradycardia      Physical Exam:    GEN: Renita Currie appears well, alert and oriented x 3, pleasant and cooperative   NECK: supple, no carotid bruits, no JVD or HJR  HEART: normal rate, regular rhythm, normal S1 and S2, no murmurs, clicks, gallops or rubs   LUNGS: clear to auscultation bilaterally; no wheezes, rales, or rhonchi   ABDOMEN: normal bowel sounds, soft, no tenderness, no distention  EXTREMITIES: peripheral pulses normal; no clubbing, cyanosis, or edema  SKIN: warm and well perfused, no suspicious lesions on exposed skin    Labs & Results:    Admission on 09/02/2020   Component Date Value    Troponin I 09/02/2020 3 73*    Sodium 09/02/2020 140     Potassium 09/02/2020 3 8     Chloride 09/02/2020 109*    CO2 09/02/2020 22     ANION GAP 09/02/2020 9     BUN 09/02/2020 18     Creatinine 09/02/2020 1 29     Glucose 09/02/2020 90     Calcium 09/02/2020 9 2     eGFR 09/02/2020 68     Magnesium 09/02/2020 2 3     WBC 09/02/2020 12 14*    RBC 09/02/2020 6 37*    Hemoglobin 09/02/2020 17 7*    Hematocrit 09/02/2020 53 5*    MCV 09/02/2020 84     MCH 09/02/2020 27 8     MCHC 09/02/2020 33 1     RDW 09/02/2020 13 8     MPV 09/02/2020 10 2     Platelets 62/69/7145 265     nRBC 09/02/2020 0     Neutrophils Relative 09/02/2020 67     Immat GRANS % 09/02/2020 1     Lymphocytes Relative 09/02/2020 22     Monocytes Relative 09/02/2020 7     Eosinophils Relative 09/02/2020 2     Basophils Relative 09/02/2020 1     Neutrophils Absolute 09/02/2020 8 17*    Immature Grans Absolute 09/02/2020 0 07     Lymphocytes Absolute 09/02/2020 2 72     Monocytes Absolute 09/02/2020 0 89     Eosinophils Absolute 09/02/2020 0 21     Basophils Absolute 09/02/2020 0 08     PTT 09/02/2020 70*    PTT 09/02/2020 58*    WBC 09/03/2020 12 53*    RBC 09/03/2020 5 71*    Hemoglobin 09/03/2020 16 2     Hematocrit 09/03/2020 48 1     MCV 09/03/2020 84     MCH 09/03/2020 28 4     MCHC 09/03/2020 33 7     RDW 09/03/2020 13 3     MPV 09/03/2020 10 3     Platelets 46/89/7822 254     nRBC 09/03/2020 0     Neutrophils Relative 09/03/2020 56     Immat GRANS % 09/03/2020 0     Lymphocytes Relative 09/03/2020 34     Monocytes Relative 09/03/2020 7     Eosinophils Relative 09/03/2020 2     Basophils Relative 09/03/2020 1     Neutrophils Absolute 09/03/2020 7 04     Immature Grans Absolute 09/03/2020 0 04     Lymphocytes Absolute 09/03/2020 4 24     Monocytes Absolute 09/03/2020 0 90     Eosinophils Absolute 09/03/2020 0 24     Basophils Absolute 09/03/2020 0 07     Sodium 09/03/2020 141     Potassium 09/03/2020 3 8     Chloride 09/03/2020 111*    CO2 09/03/2020 23     ANION GAP 09/03/2020 7     BUN 09/03/2020 18     Creatinine 09/03/2020 1 19     Glucose 09/03/2020 97     Calcium 09/03/2020 8 4     eGFR 09/03/2020 75     PTT 09/03/2020 82*    PTT 09/03/2020 73*    Ventricular Rate 09/03/2020 67     Atrial Rate 09/03/2020 67     W[place] Interval 09/03/2020 160     QRSD Interval 09/03/2020 90     QT Interval 09/03/2020 414     QTC Interval 09/03/2020 437     P Axis 09/03/2020 47     QRS Axis 09/03/2020 26     T Wave Axis 09/03/2020 92     Sodium 09/04/2020 141     Potassium 09/04/2020 3 7     Chloride 09/04/2020 110*    CO2 09/04/2020 23     ANION GAP 09/04/2020 8     BUN 09/04/2020 15     Creatinine 09/04/2020 1 21     Glucose 09/04/2020 95     Calcium 09/04/2020 9 3     eGFR 09/04/2020 74     WBC 09/04/2020 12 84*    RBC 09/04/2020 5 90*    Hemoglobin 09/04/2020 16 4     Hematocrit 09/04/2020 50 1*    MCV 09/04/2020 85     MCH 09/04/2020 27 8     MCHC 09/04/2020 32 7     RDW 09/04/2020 13 3     MPV 09/04/2020 10 5     Platelets 30/92/4504 253     nRBC 09/04/2020 0     Neutrophils Relative 09/04/2020 62     Immat GRANS % 09/04/2020 1     Lymphocytes Relative 09/04/2020 26     Monocytes Relative 09/04/2020 8     Eosinophils Relative 09/04/2020 2     Basophils Relative 09/04/2020 1     Neutrophils Absolute 09/04/2020 8 01*    Immature Grans Absolute 09/04/2020 0 06     Lymphocytes Absolute 09/04/2020 3 39     Monocytes Absolute 09/04/2020 1 05     Eosinophils Absolute 09/04/2020 0 27     Basophils Absolute 09/04/2020 0 06     Ventricular Rate 09/03/2020 65     Atrial Rate 09/03/2020 65     IL Interval 09/03/2020 160     QRSD Interval 09/03/2020 90     QT Interval 09/03/2020 418     QTC Interval 09/03/2020 434     P Axis 09/03/2020 48     QRS Axis 09/03/2020 21     T Wave Axis 09/03/2020 80        Xr Chest 2 Views    Result Date: 9/1/2020  Narrative: CHEST INDICATION:   chest pain  COMPARISON:  None EXAM PERFORMED/VIEWS:  XR CHEST PA & LATERAL FINDINGS: Cardiomediastinal silhouette appears unremarkable  Linear subsegmental atelectasis in the lingula  No focal consolidation, pleural effusion or pneumothorax  Osseous structures appear within normal limits for patient age  Impression: No acute cardiopulmonary disease  Workstation performed: [id number]       EKG personally reviewed by Juana Lane MD      Counseling / Coordination of Care  Total floor / unit time spent today 30 minutes  Greater than 50% of total time was spent with the patient and / or family counseling and / or coordination of care

## 2020-09-04 NOTE — ASSESSMENT & PLAN NOTE
· Initially presented to [facility] with epigastric discomfort; found to have elevated troponins with peak of 10; was unclear NSTEMI type 1 vs non-MI troponin elevation 2/2 accelerated HTN at this time  Echo showed wall motion abnormality, patient was transferred to [facility] [place] to undergo cardiac catheterization  · Cardiology consulted, input appreciated  · Cath done showed RDA-- a 90 % stenosis in the distal third of the vessel segment  This lesion is a likely culprit for the patient's recent myocardial infarction  It does not appear amenable to intervention   Aggressive medical management recommended   · Continue aspirin, statin, beta-blocker and Plavix at discharge  · Follow up with cardiology outpatient

## 2020-09-04 NOTE — ASSESSMENT & PLAN NOTE
· Total cholesterol 196, ; started on statin therapy  · Continue atorvastatin 40mg  · Follow up with primary care

## 2020-09-04 NOTE — ASSESSMENT & PLAN NOTE
· Body mass index is 32 08 kg/m²    · Reports history of snoring and apneic episodes   · Outpatient sleep study ordered for discharge

## 2020-09-04 NOTE — QUICK NOTE
Pt with episode of epigastric pain  VSS on RA  Reports no improvement in symptoms with SL nitro, resolved with Tums  EKG unchanged from prior  Cardiac cath today 90% stenosis R PDA, not amenable to intervention  Chart reviewed with on call cardiology, appreciated  Heparin gtt d/c today   Brilinta d/c and scheduled Plavix ordered to start in AM

## 2020-09-04 NOTE — PLAN OF CARE
Problem: PAIN - ADULT  Goal: Verbalizes/displays adequate comfort level or baseline comfort level  Description: Interventions:  - Encourage patient to monitor pain and request assistance  - Assess pain using appropriate pain scale  - Administer analgesics based on type and severity of pain and evaluate response  - Implement non-pharmacological measures as appropriate and evaluate response  - Consider cultural and social influences on pain and pain management  - Notify physician/advanced practitioner if interventions unsuccessful or patient reports new pain  Outcome: Progressing     Problem: INFECTION - ADULT  Goal: Absence or prevention of progression during hospitalization  Description: INTERVENTIONS:  - Assess and monitor for signs and symptoms of infection  - Monitor lab/diagnostic results  - Monitor all insertion sites, i e  indwelling lines, tubes, and drains  - Monitor endotracheal if appropriate and nasal secretions for changes in amount and color  - [place] appropriate cooling/warming therapies per order  - Administer medications as ordered  - Instruct and encourage patient and family to use good hand hygiene technique  - Identify and instruct in appropriate isolation precautions for identified infection/condition  Outcome: Progressing  Goal: Absence of fever/infection during neutropenic period  Description: INTERVENTIONS:  - Monitor WBC    Outcome: Progressing     Problem: SAFETY ADULT  Goal: Patient will remain free of falls  Description: INTERVENTIONS:  - Assess patient frequently for physical needs  -  Identify cognitive and physical deficits and behaviors that affect risk of falls    -  [place] fall precautions as indicated by assessment   - Educate patient/family on patient safety including physical limitations  - Instruct patient to call for assistance with activity based on assessment  - Modify environment to reduce risk of injury  - Consider OT/PT consult to assist with strengthening/mobility  Outcome: Progressing  Goal: Maintain or return to baseline ADL function  Description: INTERVENTIONS:  -  Assess patient's ability to carry out ADLs; assess patient's baseline for ADL function and identify physical deficits which impact ability to perform ADLs (bathing, care of mouth/teeth, toileting, grooming, dressing, etc )  - Assess/evaluate cause of self-care deficits   - Assess range of motion  - Assess patient's mobility; develop plan if impaired  - Assess patient's need for assistive devices and provide as appropriate  - Encourage maximum independence but intervene and supervise when necessary  - Involve family in performance of ADLs  - Assess for home care needs following discharge   - Consider OT consult to assist with ADL evaluation and planning for discharge  - Provide patient education as appropriate  Outcome: Progressing  Goal: Maintain or return mobility status to optimal level  Description: INTERVENTIONS:  - Assess patient's baseline mobility status (ambulation, transfers, stairs, etc )    - Identify cognitive and physical deficits and behaviors that affect mobility  - Identify mobility aids required to assist with transfers and/or ambulation (gait belt, sit-to-stand, lift, walker, cane, etc )  - [place] fall precautions as indicated by assessment  - Record patient progress and toleration of activity level on Mobility SBAR; progress patient to next Phase/Stage  - Instruct patient to call for assistance with activity based on assessment  - Consider rehabilitation consult to assist with strengthening/weightbearing, etc   Outcome: Progressing     Problem: DISCHARGE PLANNING  Goal: Discharge to home or other facility with appropriate resources  Description: INTERVENTIONS:  - Identify barriers to discharge w/patient and caregiver  - Arrange for needed discharge resources and transportation as appropriate  - Identify discharge learning needs (meds, wound care, etc )  - Arrange for interpretive services to assist at discharge as needed  - Refer to Case Management Department for coordinating discharge planning if the patient needs post-hospital services based on physician/advanced practitioner order or complex needs related to functional status, cognitive ability, or social support system  Outcome: Progressing     Problem: Knowledge Deficit  Goal: Patient/family/caregiver demonstrates understanding of disease process, treatment plan, medications, and discharge instructions  Description: Complete learning assessment and assess knowledge base    Interventions:  - Provide teaching at level of understanding  - Provide teaching via preferred learning methods  Outcome: Progressing     Problem: CARDIOVASCULAR - ADULT  Goal: Maintains optimal cardiac output and hemodynamic stability  Description: INTERVENTIONS:  - Monitor I/O, vital signs and rhythm  - Monitor for S/S and trends of decreased cardiac output  - Administer and titrate ordered vasoactive medications to optimize hemodynamic stability  - Assess quality of pulses, skin color and temperature  - Assess for signs of decreased coronary artery perfusion  - Instruct patient to report change in severity of symptoms  Outcome: Progressing  Goal: Absence of cardiac dysrhythmias or at baseline rhythm  Description: INTERVENTIONS:  - Continuous cardiac monitoring, vital signs, obtain 12 lead EKG if ordered  - Administer antiarrhythmic and heart rate control medications as ordered  - Monitor electrolytes and administer replacement therapy as ordered  Outcome: Progressing     Problem: HEMATOLOGIC - ADULT  Goal: Maintains hematologic stability  Description: INTERVENTIONS  - Assess for signs and symptoms of bleeding or hemorrhage  - Monitor labs  - Administer supportive blood products/factors as ordered and appropriate  Outcome: Progressing

## 2020-09-14 NOTE — UTILIZATION REVIEW
Notification of Discharge  This is a Notification of Discharge from our facility [address]  Please be advised that this patient has been discharge from our facility  Below you will find the admission and discharge date and time including the patients disposition  PRESENTATION DATE: 9/1/2020  1:06 PM  OBS ADMISSION DATE:   IP ADMISSION DATE: 9/1/20 1425   DISCHARGE DATE: 9/2/2020  2:41 PM  DISPOSITION: Home/Self Care Home/Self Care   Admission Orders listed below:  Admission Orders (From admission, onward)     Ordered        09/01/20 1425  Inpatient Admission (expected length of stay for this patient Order details is greater than two midnights)  Once                   Please contact the UR Department if additional information is required to close this patient's authorization/case  Williszachariah Mattajeronimo  [facility] Utilization Review Department  Main: [phone] x carefully listen to the prompts  All voicemails are confidential   [email or web address]  Send all requests for admission clinical reviews, approved or denied determinations and any other requests to dedicated fax number below belonging to the campus where the patient is receiving treatment   List of dedicated fax numbers:  [address] DENIALS (Administrative/Medical Necessity) [phone]   [address]  (Maternity/NICU/Pediatrics) [phone] 54[address] [facility] [phone]   Jethro Nichols [phone]   Shahnaz Parada [phone]   [facility] [address] [facility] [phone]   [facility]  [phone]   [address] [facility]  [address] [facility] [address] [facility] [phone]

## 2020-09-15 NOTE — UTILIZATION REVIEW
Notification of Discharge  This is a Notification of Discharge from our facility [address]  Please be advised that this patient has been discharge from our facility  Below you will find the admission and discharge date and time including the patients disposition  PRESENTATION DATE: 9/2/2020  3:11 PM    IP ADMISSION DATE: 9/2/20 1511   DISCHARGE DATE: 9/4/2020 10:47 AM  DISPOSITION: Home/Self Care Home/Self Care   Admission Orders listed below:  Admission Orders (From admission, onward)     Ordered        09/02/20 1528  Inpatient Admission  Once                   Please contact the UR Department if additional information is required to close this patient's authorization/case  Speedy Corrales  [facility] Utilization Review Department  Main: [phone] x carefully listen to the prompts  All voicemails are confidential   [email or web address]  Send all requests for admission clinical reviews, approved or denied determinations and any other requests to dedicated fax number below belonging to the campus where the patient is receiving treatment   List of dedicated fax numbers:  [address] DENIALS (Administrative/Medical Necessity) [phone]   [address] (Maternity/NICU/Pediatrics) [phone]   [facility] [phone]   Glendy Lee [phone]   [facility] [employer] [phone]   [facility] [address] [facility] [phone]   [facility]  [phone]   [address] [facility]  24[address] [facility] [address] [facility] [phone]

## 2020-09-29 ENCOUNTER — OFFICE VISIT (OUTPATIENT)
Dept: CARDIOLOGY CLINIC | Facility: CLINIC | Age: 40
End: 2020-09-29
Payer: COMMERCIAL

## 2020-09-29 VITALS
DIASTOLIC BLOOD PRESSURE: 98 MMHG | BODY MASS INDEX: 31.64 KG/M2 | SYSTOLIC BLOOD PRESSURE: 130 MMHG | WEIGHT: 221 LBS | HEART RATE: 89 BPM | TEMPERATURE: 98 F | HEIGHT: 70 IN

## 2020-09-29 DIAGNOSIS — I10 ESSENTIAL HYPERTENSION: ICD-10-CM

## 2020-09-29 DIAGNOSIS — Z72.0 TOBACCO ABUSE: ICD-10-CM

## 2020-09-29 DIAGNOSIS — I25.10 CORONARY ARTERY DISEASE INVOLVING NATIVE CORONARY ARTERY OF NATIVE HEART WITHOUT ANGINA PECTORIS: ICD-10-CM

## 2020-09-29 DIAGNOSIS — K21.9 GASTROESOPHAGEAL REFLUX DISEASE WITHOUT ESOPHAGITIS: ICD-10-CM

## 2020-09-29 DIAGNOSIS — E78.5 HLD (HYPERLIPIDEMIA): ICD-10-CM

## 2020-09-29 DIAGNOSIS — I16.0 HYPERTENSIVE URGENCY: ICD-10-CM

## 2020-09-29 DIAGNOSIS — E78.2 MIXED HYPERLIPIDEMIA: Primary | ICD-10-CM

## 2020-09-29 DIAGNOSIS — I21.4 TYPE 1 NON-ST ELEVATION MYOCARDIAL INFARCTION (NSTEMI) (HCC): ICD-10-CM

## 2020-09-29 PROCEDURE — 99214 OFFICE O/P EST MOD 30 MIN: CPT | Performed by: INTERNAL MEDICINE

## 2020-09-29 RX ORDER — AMLODIPINE BESYLATE 5 MG/1
5 TABLET ORAL DAILY
Qty: 90 TABLET | Refills: 3 | Status: SHIPPED | OUTPATIENT
Start: 2020-09-29 | End: 2021-09-09 | Stop reason: SDUPTHER

## 2020-09-29 RX ORDER — METOPROLOL SUCCINATE 25 MG/1
25 TABLET, EXTENDED RELEASE ORAL DAILY
Qty: 90 TABLET | Refills: 3 | Status: SHIPPED | OUTPATIENT
Start: 2020-09-29 | End: 2021-09-09 | Stop reason: SDUPTHER

## 2020-09-29 RX ORDER — PANTOPRAZOLE SODIUM 40 MG/1
40 TABLET, DELAYED RELEASE ORAL
Qty: 90 TABLET | Refills: 3 | Status: SHIPPED | OUTPATIENT
Start: 2020-09-29 | End: 2020-12-03 | Stop reason: SDUPTHER

## 2020-09-29 RX ORDER — ATORVASTATIN CALCIUM 40 MG/1
40 TABLET, FILM COATED ORAL
Qty: 90 TABLET | Refills: 3 | Status: SHIPPED | OUTPATIENT
Start: 2020-09-29 | End: 2021-06-17

## 2020-09-29 RX ORDER — LOSARTAN POTASSIUM 50 MG/1
50 TABLET ORAL DAILY
Qty: 90 TABLET | Refills: 3 | Status: SHIPPED | OUTPATIENT
Start: 2020-09-29 | End: 2020-12-18 | Stop reason: SDUPTHER

## 2020-09-29 RX ORDER — CLOPIDOGREL BISULFATE 75 MG/1
75 TABLET ORAL DAILY
Qty: 90 TABLET | Refills: 3 | Status: SHIPPED | OUTPATIENT
Start: 2020-09-29 | End: 2021-09-09 | Stop reason: SDUPTHER

## 2020-09-29 NOTE — PATIENT INSTRUCTIONS
Recommendations:  1  Switch to metoprolol succinate 25mg daily  2  Continue remainder of medications  3  Counseled tobacco cessation  4  Follow up in 3 months

## 2020-09-29 NOTE — PROGRESS NOTES
Cardiology   Micheal Butt 38 y o  male MRN: [id number]        Impression:  1  CAD - dPDA 90% treated with medical management  Echo demonstrated EF 55% with inferior hypokinesis  2  Hypertension - controlled  3  Dyslipidemia - on statin  4  Tobacco abuse     Recommendations:  1  Switch to metoprolol succinate 25mg daily  2  Continue remainder of medications  3  Counseled tobacco cessation  4  Follow up in 3 months  HPI: Micheal Butt is a 38y o  year old male with NSTEMI 9/20, hypertension, dyslipidemia, who presents for follow up  Had a dPDA 90%, that was treated medically  No chest pain, shortness of breath, or palpitations  Review of Systems   Constitutional: Negative  HENT: Negative  Eyes: Negative  Respiratory: Negative for chest tightness and shortness of breath  Cardiovascular: Negative for chest pain, palpitations and leg swelling  Gastrointestinal: Negative  Endocrine: Negative  Genitourinary: Negative  Musculoskeletal: Negative  Skin: Negative  Allergic/Immunologic: Negative  Neurological: Negative  Hematological: Negative  Psychiatric/Behavioral: Negative  All other systems reviewed and are negative  Past Medical History:   Diagnosis Date    Chronic pain     GERD (gastroesophageal reflux disease)     Hypertension      Past Surgical History:   Procedure Laterality Date    NO PAST SURGERIES       Social History     Substance and Sexual Activity   Alcohol Use Yes    Comment: social     Social History     Substance and Sexual Activity   Drug Use Never     Social History     Tobacco Use   Smoking Status Current Every Day Smoker    Packs/day: 1 00   Smokeless Tobacco Never Used     No family history on file      Allergies:  No Known Allergies    Medications:     Current Outpatient Medications:     amLODIPine (NORVASC) 5 mg tablet, Take 1 tablet (5 mg total) by mouth daily, Disp: 90 tablet, Rfl: 3    aspirin (ECOTRIN LOW STRENGTH) 81 mg EC tablet, Take 1 tablet (81 mg total) by mouth daily, Disp: 30 tablet, Rfl: 0    atorvastatin (LIPITOR) 40 mg tablet, Take 1 tablet (40 mg total) by mouth daily with dinner, Disp: 90 tablet, Rfl: 3    clopidogrel (PLAVIX) 75 mg tablet, Take 1 tablet (75 mg total) by mouth daily, Disp: 90 tablet, Rfl: 3    losartan (COZAAR) 50 mg tablet, Take 1 tablet (50 mg total) by mouth daily, Disp: 90 tablet, Rfl: 3    pantoprazole (PROTONIX) 40 mg tablet, Take 1 tablet (40 mg total) by mouth daily in the early morning, Disp: 90 tablet, Rfl: 3    metoprolol succinate (TOPROL-XL) 25 mg 24 hr tablet, Take 1 tablet (25 mg total) by mouth daily, Disp: 90 tablet, Rfl: 3    nitroglycerin (NITROSTAT) 0 4 mg SL tablet, Place 1 tablet (0 4 mg total) under the tongue every 5 (five) minutes as needed for chest pain (Patient not taking: Reported on 9/29/2020), Disp: 20 tablet, Rfl: 0      Wt Readings from Last 3 Encounters:   09/29/20 100 kg (221 lb)   09/02/20 101 kg (223 lb 8 7 oz)   09/01/20 102 kg (225 lb)     Temp Readings from Last 3 Encounters:   09/29/20 98 °F (36 7 °C) (Temporal)   09/04/20 97 8 °F (36 6 °C)   09/02/20 97 9 °F (36 6 °C)     BP Readings from Last 3 Encounters:   09/29/20 130/98   09/04/20 157/98   09/02/20 139/64     Pulse Readings from Last 3 Encounters:   09/29/20 89   09/04/20 63   09/02/20 63         Physical Exam  HENT:      Head: Atraumatic  Mouth/Throat:      Mouth: Mucous membranes are moist    Eyes:      Extraocular Movements: Extraocular movements intact  Neck:      Musculoskeletal: Normal range of motion  Cardiovascular:      Rate and Rhythm: Normal rate and regular rhythm  Heart sounds: Normal heart sounds  Pulmonary:      Effort: Pulmonary effort is normal       Breath sounds: Normal breath sounds  Abdominal:      General: Abdomen is flat  Musculoskeletal: Normal range of motion  Skin:     General: Skin is warm  Neurological:      General: No focal deficit present  Mental Status: He is alert and oriented to person, place, and time  Psychiatric:         Mood and Affect: Mood normal          Behavior: Behavior normal            Laboratory Studies:  CMP:  Lab Results   Component Value Date    K 3 7 2020     (H) 2020    CO2 23 2020    BUN 15 2020    CREATININE 1 21 2020    AST 55 (H) 2020    ALT 76 2020    EGFR 74 2020       Lipid Profile:   No results found for: CHOL  Lab Results   Component Value Date    HDL 24 (L) 2020     Lab Results   Component Value Date    LDLCALC 128 (H) 2020     Lab Results   Component Value Date    TRIG 222 (H) 2020       Cardiac testing:     Results for orders placed during the hospital encounter of 20   Echo complete with contrast if indicated    Narrative [address]  38 [facility], 210 [facility]    Transthoracic Echocardiogram  2D, M-mode, Doppler, and Color Doppler    Study date:  02-Sep-2020    Patient: Mabel Kirby  MR number: [id number]  Account number: [de-identified]  : 1980  Age: 38 years  Gender: Male  Status: Inpatient  Location: [address] [place] [address]  Height: 70 in  Weight: 224 6 lb  BP: 159/ 118 mmHg    Indications: Chest pain    Diagnoses: R07 9 - Chest pain, unspecified    Sonographer:  CHARLES Johnson Cap  Primary Physician:  Mahogany Holm MD  Referring Physician:  TERRENCE Walton  Group:  [facility]  Interpreting Physician:  Mireille Winchester MD    SUMMARY    LEFT VENTRICLE:  Size was normal   Systolic function was normal  Ejection fraction was estimated to be 55 %  There was moderate hypokinesis of the basal-mid inferior wall  Wall thickness was mildly increased  Doppler parameters were consistent with abnormal left ventricular relaxation (grade 1 diastolic dysfunction)      RIGHT VENTRICLE:  The size was normal   Systolic function was normal     TRICUSPID VALVE:  There was trace regurgitation  HISTORY: PRIOR HISTORY: MI; HTN; GERD; Smoker; CKD 3; Chronic pain    PROCEDURE: The study was performed in the [address] [place] [address]  This was a routine study  The transthoracic approach was used  The study included complete 2D imaging, M-mode, complete spectral Doppler, and color Doppler  Echocardiographic  views were limited due to decreased penetration and lung interference  This was a technically difficult study  LEFT VENTRICLE: Size was normal  Systolic function was normal  Ejection fraction was estimated to be 55 %  There were no regional wall motion abnormalities  There was moderate hypokinesis of the basal-mid inferior wall  Wall thickness was  mildly increased  DOPPLER: Doppler parameters were consistent with abnormal left ventricular relaxation (grade 1 diastolic dysfunction)  RIGHT VENTRICLE: The size was normal  Systolic function was normal  Wall thickness was normal     LEFT ATRIUM: Size was normal     RIGHT ATRIUM: Size was normal     MITRAL VALVE: Valve structure was normal  There was normal leaflet separation  DOPPLER: The transmitral velocity was within the normal range  There was no evidence for stenosis  There was no regurgitation  AORTIC VALVE: The valve was trileaflet  Leaflets exhibited normal thickness and normal cuspal separation  DOPPLER: Transaortic velocity was within the normal range  There was no evidence for stenosis  There was no regurgitation  TRICUSPID VALVE: The valve structure was normal  There was normal leaflet separation  DOPPLER: The transtricuspid velocity was within the normal range  There was no evidence for stenosis  There was trace regurgitation  The tricuspid jet  envelope definition was inadequate for estimation of RV systolic pressure  There are no indirect findings suggestive of moderate or severe pulmonary hypertension  PULMONIC VALVE: Leaflets exhibited normal thickness, no calcification, and normal cuspal separation   DOPPLER: The transpulmonic velocity was within the normal range  There was no regurgitation  PERICARDIUM: There was no pericardial effusion  The pericardium was normal in appearance  AORTA: The root exhibited normal size  SYSTEMIC VEINS: IVC: The inferior vena cava was not well visualized  SYSTEM MEASUREMENT TABLES    2D  %FS: 37 9 %  AV Diam: 3 06 cm  EDV(Teich): 140 42 ml  EF Biplane: 46 65 %  EF(Teich): 67 54 %  ESV(Teich): 45 58 ml  IVSd: 1 03 cm  LA Area: 17 68 cm2  LA Diam: 4 2 cm  LVEDV MOD A2C: 102 51 ml  LVEDV MOD A4C: 115 51 ml  LVEDV MOD BP: 113 43 ml  LVEF MOD A2C: 50 6 %  LVEF MOD A4C: 40 28 %  LVESV MOD A2C: 50 64 ml  LVESV MOD A4C: 68 99 ml  LVESV MOD BP: 60 52 ml  LVIDd: 5 39 cm  LVIDs: 3 34 cm  LVLd A2C: 8 42 cm  LVLd A4C: 7 61 cm  LVLs A2C: 6 95 cm  LVLs A4C: 7 35 cm  LVPWd: 1 04 cm  RA Area: 13 53 cm2  RVIDd: 3 15 cm  SV MOD A2C: 51 87 ml  SV MOD A4C: 46 52 ml  SV(Teich): 94 84 ml    CW  TR Vmax: 2 31 m/s  TR maxP 32 mmHg    MM  TAPSE: 1 62 cm    PW  E': 0 05 m/s  E/E': 11 01  MV A Ayaan: 0 67 m/s  MV Dec [place]: 2 28 m/s2  MV DecT: 235 02 ms  MV E Ayaan: 0 54 m/s  MV E/A Ratio: 0 8  MV PHT: 68 16 ms  MVA By PHT: 3 23 cm2    [employer] Accredited Echocardiography Laboratory    Prepared and electronically signed by    Vanesa Guardado MD  Signed 02-Sep-2020 15:18:04       No results found for this or any previous visit    Results for orders placed during the hospital encounter of 20   Cardiac catheterization    Narrative [address]  [address]  [phone]    [facility]    Invasive Cardiovascular Lab Complete Report    Patient: Marisol Nova  MR number: [id number]  Account number: [de-identified]  Study date: 2020  Gender: Male  : 1980  Height: 70 1 in  Weight: 224 4 lb  BSA: 2 2 mï¾²    Allergies: NO KNOWN ALLERGIES    Diagnostic Cardiologist:  Gautam Reyna MD  Primary Physician:  Earlene Brasher, MD    SUMMARY    CORONARY CIRCULATION:  Right PDA: There was a 90 % stenosis in the distal third of the vessel segment  This lesion is a likely culprit for the patient's recent myocardial infarction  It does not appear amenable to intervention  CARDIAC STRUCTURES:  Analysis of regional contractile function demonstrated mild diaphragmatic hypokinesis  (distal/apical)  Global left ventricular function was normal  EF calculated by contrast ventriculography was 60 %  INDICATIONS:  --  Possible CAD: myocardial infarction without ST elevation (NSTEMI)  PROCEDURES PERFORMED    --  Left heart catheterization with ventriculography  --  Left coronary angiography  --  Right coronary angiography  --  Inpatient  --  Mod Sedation Same Physician Initial 15min  --  Coronary Catheterization (w/ LHC)  --  Mod Sedation Same Physician Add 15min  PROCEDURE: The risks and alternatives of the procedures and conscious sedation were explained to the patient and informed consent was obtained  The patient was brought to the cath lab and placed on the table  The planned puncture sites  were prepped and draped in the usual sterile fashion  --  Right radial artery access  After performing an Christopher's test to verify adequate ulnar artery supply to the hand, the radial site was prepped  The puncture site was infiltrated with local anesthetic  The vessel was accessed using the  modified Seldinger technique, a wire was advanced into the vessel, and a sheath was advanced over the wire into the vessel  --  Left heart catheterization with ventriculography  A catheter was advanced over a guidewire into the ascending aorta  After recording ascending aortic pressure, the catheter was advanced across the aortic valve and left ventricular  pressure was recorded  Ventriculography was performed  The catheter was pulled back across the aortic valve and into the ascending aorta and pullback pressures were obtained      --  Left coronary artery angiography  A catheter was advanced over a guidewire into the aorta and positioned in the left coronary artery ostium under fluoroscopic guidance  Angiography was performed  --  Right coronary artery angiography  A catheter was advanced over a guidewire into the aorta and positioned in the right coronary artery ostium under fluoroscopic guidance  Angiography was performed  --  Inpatient  --  Mod Sedation Same Physician Initial 15min  --  Coronary Catheterization (w/ LHC)  --  Mod Sedation Same Physician Add 15min  PROCEDURE COMPLETION: The patient tolerated the procedure well and was discharged from the cath lab  TIMING: Test started at 11:07  Test concluded at 11:24  HEMOSTASIS: The sheath was removed  The site was compressed with a Hemoband  device  Hemostasis was obtained  MEDICATIONS GIVEN: 1% Lidocaine, 1 ml, subcutaneously, at 11:08  Heparin 1000 units/ml, 4,000 units, IV, at 11:09  Verapamil (5mg/2ml), 2 5 mg, IV, at 11:09  Nitroglycerin (200mcg/ml), 200 mcg, at 11:09  Versed (2mg/2ml), 1 mg, IV, at 11:11  Fentanyl (1OOmcg/2 ml), 50 mcg, IV, at 11:11  CONTRAST GIVEN: 85 ml Omnipaque (350mg I /ml)  36 ml Omnipaque (350mg I /ml)  RADIATION EXPOSURE: Fluoroscopy time: 2 3 min  HEMODYNAMICS: Hemodynamic assessment demonstrated normal LVEDP  VENTRICLES:   --  Analysis of regional contractile function demonstrated mild diaphragmatic hypokinesis  (distal/apical) Global left ventricular function was normal  EF calculated by contrast ventriculography was 60 %  VALVES:  AORTIC VALVE:   --  There was no aortic stenosis  MITRAL VALVE:   --  The mitral valve exhibited no regurgitation  CORONARY VESSELS:   --  The coronary circulation is right dominant  --  Left main: 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  --  LAD: The vessel was medium to large sized  Angiography showed mild atherosclerosis    --  Mid LAD: There was a tubular 40 % stenosis  --  Circumflex: The vessel was large sized  Angiography showed no evidence of disease  There were two major obtuse marginals  --  RCA: The vessel was medium sized and moderately tortuous  --  Right PDA: The vessel was small sized  There was a 90 % stenosis in the distal third of the vessel segment  This lesion is a likely culprit for the patient's recent myocardial infarction  It does not appear amenable to intervention  IMPRESSIONS:  There is significant single vessel coronary artery disease  type 1 MI    RECOMMENDATIONS  Patient management should include adding lipid lowering therapy  Patient management should include an exercise program, smoking cessation, and aggressive risk factor modification  DISPOSITION:  The patient left the catheterization laboratory in stable condition  Prepared and signed by    Deanna Kerns MD  Signed 2020 11:31:04    Study diagram    Angiographic findings  Native coronary lesions:  ï¾·Mid LAD: Lesion 1: tubular, 40 % stenosis  ï¾·RPDA: Lesion 1: 90 % stenosis  Hemodynamic tables    Pressures:  Baseline  Pressures:  - HR: 91  Pressures:  - Rhythm:  Pressures:  -- Aortic Pressure (S/D/M): 143/98/120  Pressures:  -- Left Ventricle (s/edp): 151/15/--    Outputs:  Baseline  Outputs:  -- CALCULATIONS: [age]  Outputs:  -- CALCULATIONS: Body Surface Area: 2 20  Outputs:  -- CALCULATIONS: Height in cm: 178 00  Outputs:  -- CALCULATIONS: Sex: Male  Outputs:  -- CALCULATIONS: Weight in k 00       No results found for this or any previous visit

## 2020-09-30 ENCOUNTER — CLINICAL SUPPORT (OUTPATIENT)
Dept: CARDIAC REHAB | Facility: HOSPITAL | Age: 40
End: 2020-09-30
Payer: COMMERCIAL

## 2020-09-30 DIAGNOSIS — I21.4 TYPE 1 NON-ST ELEVATION MYOCARDIAL INFARCTION (NSTEMI) (HCC): ICD-10-CM

## 2020-09-30 PROCEDURE — 93797 PHYS/QHP OP CAR RHAB WO ECG: CPT

## 2020-09-30 NOTE — PROGRESS NOTES
CARDIAC REHAB ASSESSMENT    Today's date: 2020  Patient name: Rick Murray     : 1980       MRN: [id number]  PCP: Katarina Hand MD  Referring Physician: Gen Owen MD  Cardiologist: Dr Colton Alejo  Surgeon: n/a  Dx:   Encounter Diagnosis   Name Primary?  Type 1 non-ST elevation myocardial infarction (NSTEMI) ([facility] [address] )        Date of onset: 2020  Cultural needs: n/a    Weight:    Wt Readings from Last 1 Encounters:   20 100 kg (221 lb)      Height:   Ht Readings from Last 1 Encounters:   20 5' 10" (1 778 m)     Medical History:   Past Medical History:   Diagnosis Date    Chronic pain     GERD (gastroesophageal reflux disease)     Hypertension          Physical Limitations: B/L knee pain, left shoulder pain-needs surgery    Risk Factors   Cholesterol: Yes  Smoking: Current user:  average ppd:  1  HTN: Yes  DM: No  Obesity: Yes   Inactivity: Yes  Stress:  perceived  stress: 8/10   Stressors:family- 4 kids single father, out of work on work karri comp for shoulder injury   Goals for Stress Management:relaxation techniques    Family History:No family history on file  Allergies: Patient has no known allergies    ETOH:   Social History     Substance and Sexual Activity   Alcohol Use Yes    Comment: social         Current Medications:   Current Outpatient Medications   Medication Sig Dispense Refill    amLODIPine (NORVASC) 5 mg tablet Take 1 tablet (5 mg total) by mouth daily 90 tablet 3    aspirin (ECOTRIN LOW STRENGTH) 81 mg EC tablet Take 1 tablet (81 mg total) by mouth daily 30 tablet 0    atorvastatin (LIPITOR) 40 mg tablet Take 1 tablet (40 mg total) by mouth daily with dinner 90 tablet 3    clopidogrel (PLAVIX) 75 mg tablet Take 1 tablet (75 mg total) by mouth daily 90 tablet 3    losartan (COZAAR) 50 mg tablet Take 1 tablet (50 mg total) by mouth daily 90 tablet 3    metoprolol succinate (TOPROL-XL) 25 mg 24 hr tablet Take 1 tablet (25 mg total) by mouth daily 90 tablet 3    nitroglycerin (NITROSTAT) 0 4 mg SL tablet Place 1 tablet (0 4 mg total) under the tongue every 5 (five) minutes as needed for chest pain (Patient not taking: Reported on 9/29/2020) 20 tablet 0    pantoprazole (PROTONIX) 40 mg tablet Take 1 tablet (40 mg total) by mouth daily in the early morning 90 tablet 3     No current facility-administered medications for this visit  Functional Status Prior to Diagnosis for Treatment   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  Recreation: none  ADLs: No limitations  [place]: No limitations  Exercise: some walking-no regular aerobic exercise  Other: n/a    Current Functional Status  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  Recreation: none  ADLs: No limitations  [place]: No limitations  Exercise: some walking-no regular aerobic exercise  Other: n/a    Patient Specific Goals:  Learn healthy eating habits to help decrease lipids and weight, establish a plan for quitting smoking, establish a regular exercise program, learn how to manage stress and use relaxation techniques    Short Term Program Goals: dietary modifications increased strength improved energy/stamina with ADLs exercise 120-150 mins/wk wt loss 1-2 ppw    Long Term Goals: increased maximal walking duration  increased intial training workload  Improved [facility] Activity Status score  Improved functional capacity  Improved Quality of Life - [facility] score reduced  Improved lipid profile  Reduced body fat%  Reduced waist circumference  Smoking cessation  Reduced stress  weight loss goal of -10-15 lbs  improved Rate Your Plate Score    Ability to reach goals/rehabilitation potential:  Excellent    Projected return to function: 12 weeks  Objective tests: sub-max TM ETT      Nutritional   Reviewed details of Rate your Plate  Discussed key elements of heart healthy eating   Reviewed patient goals for dietary modifications and their clinical implications  Reviewed most recent lipid profile       Goals for dietary modification: choose lean cuts of meat  poultry without the skin  low fat ground meat and poultry  eliminate processed meats  reduce portions of meat to 3 oz  increase fish intake  more meatless meals  low fat dairy   reduced fat cheese  increase whole grains  increase fruits and vegetables  eliminate butter  low sodium  improved snack choices  more nuts/seeds  reduce sweets/frozen desserts  heathier choices while dining out      Emotional/Social  Patient reports he/she is coping well with good social support and denies depression or anxiety  Patient reports feelings of anxiety  Patient does not have sufficient emotional support    SOCIAL SUPPORT NETWORK    Marital status: single    Rate 1-5:    Marriage: n/a   Family: 4   Financial: 4   Relationships: 5   Spirituality: 4   Intellectual: 4      Domestic Violence Screening: No    Comments: n/a

## 2020-09-30 NOTE — PROGRESS NOTES
Cardiac Rehabilitation Plan of Care   Care Plan       Today's date: 2020   # of Exercise Sessions Completed: 1-evaluation  Patient name: Indra Haddad      : 1980  Age: 38 y o  MRN: [id number]  Referring Physician: José Miguel Soria MD  Cardiologist: Dr Mason Smith  Provider: Delia Nino  Clinician: Marifer Kelsey MS, CEP      Dx:   Encounter Diagnosis   Name Primary?  Type 1 non-ST elevation myocardial infarction (NSTEMI) [facility])      Date of onset: 2020      SUMMARY OF PROGRESS:  Mr Morin File is here today for his cardiac rehab evaluation after recent NSTEMI  Overall, he reports feeling well  His main concern is not knowing what to eat  He reports he has been starving himself because he is afraid to eat foods that are bad for him  Gave him some basic nutrition education today at evaluation, and encouraged him to get an appointment with dietician for further education on heart healthy eating  His main goals for his program are to learn healthy eating habits to help decrease lipids and weight, establish a plan for quitting smoking, establish a regular exercise program, and learn how to manage stress and use relaxation techniques  He reports smoking 1ppd x 29 yrs, and does not have a plan in place to quit  He states he does want to quit  Provided him with information on smoking cessation, and will plan to help him to establish a smoking cessation plan  He reports high levels of stress, which he thinks contributed to his MI  His main stress is being a single father to 4 children  Will plan on education on stress management and relaxation techniques  He seems motivated to start his rehab program and is looking forward to starting a regular exercise program to help him get in better shape, and also reduce stress  He denies depression, but does report some anxiety  He reports he does not have a lot of family support  He completed TM ETT today reaching THR at 11 min at 5 8 METs   Will plan to start exercise at 3 5-4 0 METs and increase as tolerated by patient over first 30 days of rehab  Medication compliance: Yes   Comments: Reports taking medications as prescribed    Fall Risk: Low   Comments: n/a    EKG changes: NSR with PVCs      EXERCISE ASSESSMENT and PLAN    Current Exercise Program in Rehab:       Frequency: 3 days/week Supplement with home exercise 2+ days/wk as tolerated       Minutes: 30-40         METS: 3 5-4 0            HR: 30 beats above resting   RPE: 4-6         Modalities: Treadmill, UBE, Lifecycle, Rower and Recumbent bike      Exercise Progression 30 Day Goals :    Frequency: 3 days/week      Supplement with home exercise 2+ days/wk as tolerated    Minutes: 40   METS: 4 0   HR: 30 beats above resting    RPE: 4-6   Modalities: Treadmill, UBE, Lifecycle, Elliptical, Rower and Recumbent bike    Strength trainin-3 days / week  12-15 repetitions  1-2 sets per modality   Will be added following 2-3 weeks of monitored exercise sessions   Modalities: Pull Downs, Lateral Raise, Arm Extension, Arm Curl, [employer] and Front Raises    Progressing:   In Progress    Home Exercise: None    Goals: 10% improvement in functional capacity, improved DASI score by 10%, Increase in peak CR METs by 40%, Resume ADLs with increased strength, Return to work unrestricted and Exercise 5 days/wk, >150mins/wk  Education: Benefit of exercise for CAD risk factors, home exercise guidelines, signs and sxs and RPE scale   Plan:education on home exercise guidelines, home exercise 30+ mins 2 days opposite CR and Education class: Risk Factors for Heart Disease  Readiness to change: Preparation:  (Getting ready to change)       NUTRITION ASSESSMENT AND PLAN    Weight control:    Starting weight: 221 lb   Current weight:     Waist circumference:    Startin"   Current:    Diabetes: N/A  Lipid management: Discussed diet and lipid management and Last lipid profile 2020  Chol 196    HDL 24  LDL 128  Goals:LDL <100, HDL >40, TRG <150, CHOL <200, reduced waist circumference <40 inches, Improved Rate Your Plate score  >83 and Wt  loss 1-2 ppw goal of -10-15 lbs  Education: heart healthy eating  low sodium diet  hydration  diet and lipid management  wt  loss  healthy choices while dining out  portion control  Progressing: In Progress  Plan: Education class: Reading Food Labels, Education Class: Heart Healthy Eating, Increase PUFA and MUFA, Decrease SFA, Increase whole grains, increase fruits/vegs, eliminate processed meats, reduce red meat 1x/wk, swtich to low fat dairy, Reduce added sugars <25g/day, avoid processed foods and reduce dietary sodium  Readiness to change: Preparation:  (Getting ready to change)       PSYCHOSOCIAL ASSESSMENT AND PLAN    Emotional:  Depression assessment:  PHQ-9 = 1-4 = Minimal Depression            Anxiety measure:  SARMAD-7 = 0-4  = Not anxious  Self-reported stress level:  8- r/t family  4 children single parent  Social support: Good   Goals:  Feelings in [facility] Score < 3, Physical Fitness in [facility] Score < 3, Social Support in [facility] Score < 3, Overall Health in [facility] Score < 3, Quality of Life in [facility] Score < 3 , Change in Health in [facility] Score < 3 , Increased interest in doing things, improved positive thoughts of well being and increased energy  Education: signs/sxs of depression, benefits of positive support system, coping mechanisms and depression and CAD  Progressing: In Progress  Plan: Practice relaxation techniques, Refer to [employer] and reports discussed with Dr Dawood Carmona nd was given counseling info  Readiness to change: Preparation:  (Getting ready to change)       OTHER CORE COMPONENTS     Tobacco:   Social History     Tobacco Use   Smoking Status Current Every Day Smoker    Packs/day: 1 00   Smokeless Tobacco Never Used       Tobacco Use Intervention:   Pt continues to smoke 1 ppd x 29 yrs  Does not have a smoking cessation plan established  He does report Dr Beba Pate told him he needs to stop smoking  He reports he has quit in the past x 3 months using nicotine patch  He has nicotine patch, but has not started using it yet  He reports last time he used it caused muscle pain in his arm  Encouraged him to talk with MD about other options if needed  Will plan to help patient establish a plan to cut down # of cigs/day and use nicotine replacement therapy as needed  Blood pressure:    Restin/70   Exercise: 148/78    Goals: consistent BP < 130/80, reduced dietary sodium <2300mg, consistent exercise >150 mins/wk, medication compliance, reduce number of cigarettes/day, set a target quit date and make contact with a tobacco use prevention program  Education:  understanding HTN and CAD and low sodium diet and HTN  Progressing: In Progress  Plan: Class: Understanding Heart Disease, Class: Common Heart Medications, make contact with smoking cessation program, speak to MD about nicotine replacement therapy and reduce # cigarettes each day  Readiness to change: Contemplation:  (Acknowledging that there is a problem but not yet ready or sure of wanting to make a change)

## 2020-11-24 ENCOUNTER — APPOINTMENT (EMERGENCY)
Dept: RADIOLOGY | Facility: HOSPITAL | Age: 40
End: 2020-11-24
Payer: COMMERCIAL

## 2020-11-24 ENCOUNTER — HOSPITAL ENCOUNTER (EMERGENCY)
Facility: HOSPITAL | Age: 40
Discharge: HOME | End: 2020-11-24
Attending: EMERGENCY MEDICINE
Payer: COMMERCIAL

## 2020-11-24 VITALS
OXYGEN SATURATION: 99 % | DIASTOLIC BLOOD PRESSURE: 84 MMHG | HEIGHT: 70 IN | HEART RATE: 90 BPM | BODY MASS INDEX: 31.5 KG/M2 | TEMPERATURE: 98.2 F | WEIGHT: 220 LBS | RESPIRATION RATE: 13 BRPM | SYSTOLIC BLOOD PRESSURE: 146 MMHG

## 2020-11-24 DIAGNOSIS — R07.89 ATYPICAL CHEST PAIN: Primary | ICD-10-CM

## 2020-11-24 LAB
ALBUMIN SERPL BCP-MCNC: 3.8 G/DL (ref 3.5–5)
ALP SERPL-CCNC: 100 U/L (ref 46–116)
ALT SERPL W P-5'-P-CCNC: 56 U/L (ref 12–78)
ANION GAP SERPL CALCULATED.3IONS-SCNC: 7 MMOL/L (ref 4–13)
AST SERPL W P-5'-P-CCNC: 23 U/L (ref 5–45)
BASOPHILS # BLD AUTO: 0.06 THOUSANDS/ΜL (ref 0–0.1)
BASOPHILS NFR BLD AUTO: 0 % (ref 0–1)
BILIRUB SERPL-MCNC: 0.6 MG/DL (ref 0.2–1)
BUN SERPL-MCNC: 11 MG/DL (ref 5–25)
CALCIUM SERPL-MCNC: 8.1 MG/DL (ref 8.3–10.1)
CHLORIDE SERPL-SCNC: 104 MMOL/L (ref 100–108)
CO2 SERPL-SCNC: 28 MMOL/L (ref 21–32)
CREAT SERPL-MCNC: 1.44 MG/DL (ref 0.6–1.3)
EOSINOPHIL # BLD AUTO: 0.18 THOUSAND/ΜL (ref 0–0.61)
EOSINOPHIL NFR BLD AUTO: 1 % (ref 0–6)
ERYTHROCYTE [DISTWIDTH] IN BLOOD BY AUTOMATED COUNT: 13.2 % (ref 11.6–15.1)
GFR SERPL CREATININE-BSD FRML MDRD: 60 ML/MIN/1.73SQ M
GLUCOSE SERPL-MCNC: 110 MG/DL (ref 65–140)
HCT VFR BLD AUTO: 45.9 % (ref 36.5–49.3)
HGB BLD-MCNC: 14.8 G/DL (ref 12–17)
IMM GRANULOCYTES # BLD AUTO: 0.05 THOUSAND/UL (ref 0–0.2)
IMM GRANULOCYTES NFR BLD AUTO: 0 % (ref 0–2)
LYMPHOCYTES # BLD AUTO: 3.3 THOUSANDS/ΜL (ref 0.6–4.47)
LYMPHOCYTES NFR BLD AUTO: 24 % (ref 14–44)
MCH RBC QN AUTO: 27.2 PG (ref 26.8–34.3)
MCHC RBC AUTO-ENTMCNC: 32.2 G/DL (ref 31.4–37.4)
MCV RBC AUTO: 84 FL (ref 82–98)
MONOCYTES # BLD AUTO: 1 THOUSAND/ΜL (ref 0.17–1.22)
MONOCYTES NFR BLD AUTO: 7 % (ref 4–12)
NEUTROPHILS # BLD AUTO: 8.94 THOUSANDS/ΜL (ref 1.85–7.62)
NEUTS SEG NFR BLD AUTO: 68 % (ref 43–75)
NRBC BLD AUTO-RTO: 0 /100 WBCS
PLATELET # BLD AUTO: 272 THOUSANDS/UL (ref 149–390)
PMV BLD AUTO: 9.5 FL (ref 8.9–12.7)
POTASSIUM SERPL-SCNC: 3.4 MMOL/L (ref 3.5–5.3)
PROT SERPL-MCNC: 7.2 G/DL (ref 6.4–8.2)
RBC # BLD AUTO: 5.44 MILLION/UL (ref 3.88–5.62)
SODIUM SERPL-SCNC: 139 MMOL/L (ref 136–145)
TROPONIN I SERPL-MCNC: <0.02 NG/ML
WBC # BLD AUTO: 13.53 THOUSAND/UL (ref 4.31–10.16)

## 2020-11-24 PROCEDURE — 36415 COLL VENOUS BLD VENIPUNCTURE: CPT

## 2020-11-24 PROCEDURE — 93005 ELECTROCARDIOGRAM TRACING: CPT

## 2020-11-24 PROCEDURE — 85025 COMPLETE CBC W/AUTO DIFF WBC: CPT

## 2020-11-24 PROCEDURE — 80053 COMPREHEN METABOLIC PANEL: CPT

## 2020-11-24 PROCEDURE — 71045 X-RAY EXAM CHEST 1 VIEW: CPT

## 2020-11-24 PROCEDURE — 99285 EMERGENCY DEPT VISIT HI MDM: CPT | Performed by: EMERGENCY MEDICINE

## 2020-11-24 PROCEDURE — 99285 EMERGENCY DEPT VISIT HI MDM: CPT

## 2020-11-24 PROCEDURE — 84484 ASSAY OF TROPONIN QUANT: CPT

## 2020-11-25 LAB
ATRIAL RATE: 89 BPM
P AXIS: 67 DEGREES
PR INTERVAL: 154 MS
QRS AXIS: 37 DEGREES
QRSD INTERVAL: 98 MS
QT INTERVAL: 374 MS
QTC INTERVAL: 455 MS
T WAVE AXIS: 78 DEGREES
VENTRICULAR RATE: 89 BPM

## 2020-11-25 PROCEDURE — 93010 ELECTROCARDIOGRAM REPORT: CPT | Performed by: INTERNAL MEDICINE

## 2020-12-03 DIAGNOSIS — K21.9 GASTROESOPHAGEAL REFLUX DISEASE WITHOUT ESOPHAGITIS: ICD-10-CM

## 2020-12-03 RX ORDER — PANTOPRAZOLE SODIUM 40 MG/1
40 TABLET, DELAYED RELEASE ORAL
Qty: 7 TABLET | Refills: 0 | Status: SHIPPED | OUTPATIENT
Start: 2020-12-03 | End: 2020-12-15 | Stop reason: SDUPTHER

## 2020-12-15 DIAGNOSIS — K21.9 GASTROESOPHAGEAL REFLUX DISEASE WITHOUT ESOPHAGITIS: ICD-10-CM

## 2020-12-15 RX ORDER — PANTOPRAZOLE SODIUM 40 MG/1
40 TABLET, DELAYED RELEASE ORAL
Qty: 30 TABLET | Refills: 1 | Status: SHIPPED | OUTPATIENT
Start: 2020-12-15 | End: 2020-12-18 | Stop reason: SDUPTHER

## 2020-12-18 ENCOUNTER — OFFICE VISIT (OUTPATIENT)
Dept: CARDIOLOGY CLINIC | Facility: CLINIC | Age: 40
End: 2020-12-18
Payer: COMMERCIAL

## 2020-12-18 VITALS
WEIGHT: 230 LBS | BODY MASS INDEX: 32.93 KG/M2 | SYSTOLIC BLOOD PRESSURE: 136 MMHG | DIASTOLIC BLOOD PRESSURE: 92 MMHG | HEART RATE: 80 BPM | HEIGHT: 70 IN

## 2020-12-18 DIAGNOSIS — K21.9 GASTROESOPHAGEAL REFLUX DISEASE WITHOUT ESOPHAGITIS: ICD-10-CM

## 2020-12-18 DIAGNOSIS — I25.10 CORONARY ARTERY DISEASE INVOLVING NATIVE CORONARY ARTERY OF NATIVE HEART WITHOUT ANGINA PECTORIS: Primary | ICD-10-CM

## 2020-12-18 DIAGNOSIS — Z72.0 TOBACCO ABUSE: ICD-10-CM

## 2020-12-18 DIAGNOSIS — I21.4 TYPE 1 NON-ST ELEVATION MYOCARDIAL INFARCTION (NSTEMI) (HCC): ICD-10-CM

## 2020-12-18 DIAGNOSIS — I10 ESSENTIAL HYPERTENSION: ICD-10-CM

## 2020-12-18 DIAGNOSIS — I16.0 HYPERTENSIVE URGENCY: ICD-10-CM

## 2020-12-18 DIAGNOSIS — E78.2 MIXED HYPERLIPIDEMIA: ICD-10-CM

## 2020-12-18 PROCEDURE — 99214 OFFICE O/P EST MOD 30 MIN: CPT | Performed by: INTERNAL MEDICINE

## 2020-12-18 RX ORDER — PANTOPRAZOLE SODIUM 40 MG/1
40 TABLET, DELAYED RELEASE ORAL
Qty: 90 TABLET | Refills: 3 | Status: SHIPPED | OUTPATIENT
Start: 2020-12-18 | End: 2022-01-14 | Stop reason: SDUPTHER

## 2020-12-18 RX ORDER — LOSARTAN POTASSIUM 100 MG/1
100 TABLET ORAL DAILY
Qty: 90 TABLET | Refills: 3 | Status: SHIPPED | OUTPATIENT
Start: 2020-12-18 | End: 2021-12-02 | Stop reason: SDUPTHER

## 2020-12-18 RX ORDER — VARENICLINE TARTRATE 25 MG
KIT ORAL
Qty: 53 TABLET | Refills: 0 | Status: SHIPPED | OUTPATIENT
Start: 2020-12-18 | End: 2021-10-02

## 2021-02-11 ENCOUNTER — HOSPITAL ENCOUNTER (EMERGENCY)
Facility: HOSPITAL | Age: 41
Discharge: HOME | End: 2021-02-11
Attending: EMERGENCY MEDICINE | Admitting: EMERGENCY MEDICINE
Payer: COMMERCIAL

## 2021-02-11 VITALS
TEMPERATURE: 97.3 F | HEART RATE: 66 BPM | DIASTOLIC BLOOD PRESSURE: 86 MMHG | RESPIRATION RATE: 18 BRPM | OXYGEN SATURATION: 98 % | SYSTOLIC BLOOD PRESSURE: 163 MMHG

## 2021-02-11 DIAGNOSIS — K08.89 TOOTHACHE: Primary | ICD-10-CM

## 2021-02-11 DIAGNOSIS — K04.7 DENTAL INFECTION: ICD-10-CM

## 2021-02-11 PROCEDURE — 99284 EMERGENCY DEPT VISIT MOD MDM: CPT | Performed by: EMERGENCY MEDICINE

## 2021-02-11 PROCEDURE — 99282 EMERGENCY DEPT VISIT SF MDM: CPT

## 2021-02-11 RX ORDER — HYDROCODONE BITARTRATE AND ACETAMINOPHEN 5; 325 MG/1; MG/1
1 TABLET ORAL EVERY 6 HOURS PRN
Qty: 12 TABLET | Refills: 0 | Status: SHIPPED | OUTPATIENT
Start: 2021-02-11 | End: 2021-10-02

## 2021-02-11 NOTE — DISCHARGE INSTRUCTIONS
Do not take more than 4 grams (4000 mg)  of Tylenol in a 24 hour period  Reserve narcotic for severe pain  Be aware that each tablet of the narcotic contains 325 milligrams of Tylenol  Follow-up with your dentist, oral surgeon or family doctor as needed  The dental office said you should call them if you have any further problems or need to be seen

## 2021-02-11 NOTE — ED PROVIDER NOTES
History  Chief Complaint   Patient presents with    Dental Pain     Patient presents to ED with upper right tooth pain, patient has appt to get tooth pulled on the 22nd  38 M  with pain around tooth 2 for the past several weeks  He completed a course of penicillin yesterday  He had taken clindamycin for several days prior to starting the penicillin  States that after finishing penicillin the pain is more severe  It radiates towards the right side of his neck, right ear and right temple  There is no fever, change in vision, difficulty swallowing, sore throat, cough, dyspnea, neck stiffness or other symptoms  He is not allowed to take anti-inflammatory medicines so has been taking a lot of Tylenol instead  He has not taken narcotics for many years  He is status post remote back surgery  Prior to Admission Medications   Prescriptions Last Dose Informant Patient Reported? Taking?    amLODIPine (NORVASC) 5 mg tablet  Self No No   Sig: Take 1 tablet (5 mg total) by mouth daily   aspirin (ECOTRIN LOW STRENGTH) 81 mg EC tablet  Self No No   Sig: Take 1 tablet (81 mg total) by mouth daily   atorvastatin (LIPITOR) 40 mg tablet  Self No No   Sig: Take 1 tablet (40 mg total) by mouth daily with dinner   clopidogrel (PLAVIX) 75 mg tablet  Self No No   Sig: Take 1 tablet (75 mg total) by mouth daily   losartan (COZAAR) 100 MG tablet   No No   Sig: Take 1 tablet (100 mg total) by mouth daily   metoprolol succinate (TOPROL-XL) 25 mg 24 hr tablet  Self No No   Sig: Take 1 tablet (25 mg total) by mouth daily   nitroglycerin (NITROSTAT) 0 4 mg SL tablet  Self No No   Sig: Place 1 tablet (0 4 mg total) under the tongue every 5 (five) minutes as needed for chest pain   Patient not taking: Reported on 9/29/2020   pantoprazole (PROTONIX) 40 mg tablet   No No   Sig: Take 1 tablet (40 mg total) by mouth daily in the early morning   varenicline (CHANTIX MARYLOU) 0 5 MG X 11 & 1 MG X 42 tablet   No No   Sig: Take one 0 5 mg tablet by mouth once daily for 3 days, then one 0 5 mg tablet by mouth twice daily for 4 days, then one 1 mg tablet by mouth twice daily  Facility-Administered Medications: None       Past Medical History:   Diagnosis Date    Chronic pain     GERD (gastroesophageal reflux disease)     Hypertension        Past Surgical History:   Procedure Laterality Date    NO PAST SURGERIES         History reviewed  No pertinent family history  I have reviewed and agree with the history as documented  E-Cigarette/Vaping    E-Cigarette Use Never User      E-Cigarette/Vaping Substances    Nicotine No     Flavoring No      Social History     Tobacco Use    Smoking status: Current Every Day Smoker     Packs/day: 1 00    Smokeless tobacco: Never Used   Substance Use Topics    Alcohol use: Yes     Comment: social    Drug use: Never       Review of Systems   Constitutional: Negative  HENT: Positive for dental problem  Negative for congestion, ear discharge, facial swelling, hearing loss, rhinorrhea, sinus pain, sore throat, trouble swallowing and voice change  Eyes: Negative  Respiratory: Negative  Cardiovascular: Negative  Gastrointestinal: Negative  Endocrine: Negative  Genitourinary: Negative  Musculoskeletal: Positive for back pain  Skin: Negative  Allergic/Immunologic: Negative  Neurological: Negative  Hematological: Negative  Psychiatric/Behavioral: Negative  All other systems reviewed and are negative  Physical Exam  Physical Exam  Vitals signs and nursing note reviewed  Constitutional:       Appearance: He is well-developed  HENT:      Head: Normocephalic and atraumatic  Right Ear: Tympanic membrane, ear canal and external ear normal       Left Ear: Tympanic membrane, ear canal and external ear normal       Nose: Nose normal       Mouth/Throat:      Mouth: Mucous membranes are moist       Pharynx: Oropharynx is clear   No oropharyngeal exudate or posterior oropharyngeal erythema  Comments: Tenderness to palpation of upper right 3rd molar  Surrounding gingiva mildly edematous  No abscess or drainage  Eyes:      Extraocular Movements: Extraocular movements intact  Conjunctiva/sclera: Conjunctivae normal       Pupils: Pupils are equal, round, and reactive to light  Neck:      Musculoskeletal: Normal range of motion and neck supple  No neck rigidity  Vascular: No JVD  Cardiovascular:      Rate and Rhythm: Normal rate and regular rhythm  Pulses: Normal pulses  Heart sounds: Normal heart sounds  No murmur  Pulmonary:      Effort: Pulmonary effort is normal       Breath sounds: Normal breath sounds  Abdominal:      General: Bowel sounds are normal       Palpations: Abdomen is soft  There is no mass  Tenderness: There is no abdominal tenderness  There is no guarding or rebound  Musculoskeletal: Normal range of motion  General: No tenderness  Lymphadenopathy:      Cervical: No cervical adenopathy  Skin:     General: Skin is warm and dry  Capillary Refill: Capillary refill takes less than 2 seconds  Findings: No rash  Neurological:      General: No focal deficit present  Mental Status: He is alert and oriented to person, place, and time  Mental status is at baseline  Cranial Nerves: No cranial nerve deficit  Coordination: Coordination normal       Deep Tendon Reflexes: Reflexes are normal and symmetric     Psychiatric:         Mood and Affect: Mood normal          Behavior: Behavior normal          Vital Signs  ED Triage Vitals [02/11/21 1307]   Temperature Pulse Respirations Blood Pressure SpO2   (!) 97 3 °F (36 3 °C) 66 18 163/86 98 %      Temp Source Heart Rate Source Patient Position - Orthostatic VS BP Location FiO2 (%)   Tympanic -- Lying Left arm --      Pain Score       8           Vitals:    02/11/21 1307   BP: 163/86   Pulse: 66   Patient Position - Orthostatic VS: Lying         Visual Acuity      ED Medications  Medications - No data to display    Diagnostic Studies  Results Reviewed     None                 No orders to display              Procedures  Procedures         ED Course  ED Course as of Feb 11 1650   Thu Feb 11, 2021   1340 D/w [facility] [phone]                                              [facility]  Number of Diagnoses or Management Options  Dental infection: established and improving  Toothache: established and improving     Amount and/or Complexity of Data Reviewed  Discuss the patient with other providers: yes        Disposition  Final diagnoses:   Toothache   Dental infection     Time reflects when diagnosis was documented in both MDM as applicable and the Disposition within this note     Time User Action Codes Description Comment    2/11/2021  2:17 PM Rondall Reading Add [X14 69] Toothache     2/11/2021  2:17 PM Rondall Reading Add [K04 7] Dental infection       ED Disposition     ED Disposition Condition Date/Time Comment    Discharge Stable Thu Feb 11, 2021  2:17 PM Aftab Roy discharge to home/self care              Follow-up Information     Follow up With Specialties Details Why Contact Info    your dentist or oral surgeon  Call  As needed           Discharge Medication List as of 2/11/2021  2:23 PM      START taking these medications    Details   HYDROcodone-acetaminophen (NORCO) 5-325 mg per tablet Take 1 tablet by mouth every 6 (six) hours as needed for pain for up to 12 dosesMax Daily Amount: 4 tablets, Starting Thu 2/11/2021, Normal         CONTINUE these medications which have NOT CHANGED    Details   amLODIPine (NORVASC) 5 mg tablet Take 1 tablet (5 mg total) by mouth daily, Starting Tue 9/29/2020, Normal      aspirin (ECOTRIN LOW STRENGTH) 81 mg EC tablet Take 1 tablet (81 mg total) by mouth daily, Starting Fri 9/4/2020, Normal      atorvastatin (LIPITOR) 40 mg tablet Take 1 tablet (40 mg total) by mouth daily with dinner, Starting Tue 9/29/2020, Normal clopidogrel (PLAVIX) 75 mg tablet Take 1 tablet (75 mg total) by mouth daily, Starting Tue 9/29/2020, Normal      losartan (COZAAR) 100 MG tablet Take 1 tablet (100 mg total) by mouth daily, Starting Fri 12/18/2020, Normal      metoprolol succinate (TOPROL-XL) 25 mg 24 hr tablet Take 1 tablet (25 mg total) by mouth daily, Starting Tue 9/29/2020, Normal      nitroglycerin (NITROSTAT) 0 4 mg SL tablet Place 1 tablet (0 4 mg total) under the tongue every 5 (five) minutes as needed for chest pain, Starting Fri 9/4/2020, Normal      pantoprazole (PROTONIX) 40 mg tablet Take 1 tablet (40 mg total) by mouth daily in the early morning, Starting Fri 12/18/2020, Normal      varenicline (CHANTIX MARYLOU) 0 5 MG X 11 & 1 MG X 42 tablet Take one 0 5 mg tablet by mouth once daily for 3 days, then one 0 5 mg tablet by mouth twice daily for 4 days, then one 1 mg tablet by mouth twice daily  , Normal           No discharge procedures on file      PDMP Review       Value Time User    PDMP Reviewed  Yes 2/11/2021  1:47 PM Sofía Lima DO          ED Provider  Electronically Signed by           Sofía Lima DO  02/11/21 7348

## 2021-06-17 ENCOUNTER — OFFICE VISIT (OUTPATIENT)
Dept: CARDIOLOGY CLINIC | Facility: CLINIC | Age: 41
End: 2021-06-17
Payer: COMMERCIAL

## 2021-06-17 VITALS
HEIGHT: 70 IN | SYSTOLIC BLOOD PRESSURE: 136 MMHG | DIASTOLIC BLOOD PRESSURE: 98 MMHG | BODY MASS INDEX: 32.01 KG/M2 | WEIGHT: 223.6 LBS | HEART RATE: 82 BPM

## 2021-06-17 DIAGNOSIS — I10 ESSENTIAL HYPERTENSION: Primary | ICD-10-CM

## 2021-06-17 DIAGNOSIS — E78.2 MIXED HYPERLIPIDEMIA: ICD-10-CM

## 2021-06-17 DIAGNOSIS — I25.10 CORONARY ARTERY DISEASE INVOLVING NATIVE CORONARY ARTERY OF NATIVE HEART WITHOUT ANGINA PECTORIS: ICD-10-CM

## 2021-06-17 PROCEDURE — 99214 OFFICE O/P EST MOD 30 MIN: CPT | Performed by: INTERNAL MEDICINE

## 2021-06-17 RX ORDER — ROSUVASTATIN CALCIUM 10 MG/1
10 TABLET, COATED ORAL DAILY
Qty: 90 TABLET | Refills: 3 | Status: SHIPPED | OUTPATIENT
Start: 2021-06-17 | End: 2022-06-07

## 2021-06-17 NOTE — PROGRESS NOTES
Cardiology   Shayy Tanner 42 y o  male MRN: [id number]        Impression:  1  CAD - dPDA 90% treated with medical management  Echo demonstrated EF 55% with inferior hypokinesis  2  Hypertension - borderline control  3  Dyslipidemia - on statin  4  Tobacco abuse - unable to tolerate Chantix      Recommendations:  1  Switch atorvastatin to rosuvastatin 10 mg daily  2  Continue remainder of medications  3  Check fasting lipid panel and complete metabolic profile  4  Follow up in 6 months  HPI: Shayy Tanner is a 42y o  year old male  with NSTEMI 9/20, hypertension, dyslipidemia, who presents for follow up  Had a dPDA 90%, that was treated medically   No shortness of breath or palpitations   Has some sharp pain in chest - not related to exertion  Can reproduce when presses on back  Review of Systems   Constitutional: Negative  HENT: Negative  Eyes: Negative  Respiratory: Negative for chest tightness and shortness of breath  Cardiovascular: Negative for chest pain, palpitations and leg swelling  Gastrointestinal: Negative  Endocrine: Negative  Genitourinary: Negative  Musculoskeletal: Negative  Skin: Negative  Allergic/Immunologic: Negative  Neurological: Negative  Hematological: Negative  Psychiatric/Behavioral: Negative  All other systems reviewed and are negative  Past Medical History:   Diagnosis Date    Chronic pain     GERD (gastroesophageal reflux disease)     Hypertension      Past Surgical History:   Procedure Laterality Date    NO PAST SURGERIES       Social History     Substance and Sexual Activity   Alcohol Use Yes    Comment: social     Social History     Substance and Sexual Activity   Drug Use Never     Social History     Tobacco Use   Smoking Status Current Every Day Smoker    Packs/day: 1 00   Smokeless Tobacco Never Used     No family history on file      Allergies:  No Known Allergies    Medications:     Current Outpatient Medications:     amLODIPine (NORVASC) 5 mg tablet, Take 1 tablet (5 mg total) by mouth daily, Disp: 90 tablet, Rfl: 3    aspirin (ECOTRIN LOW STRENGTH) 81 mg EC tablet, Take 1 tablet (81 mg total) by mouth daily, Disp: 30 tablet, Rfl: 0    clopidogrel (PLAVIX) 75 mg tablet, Take 1 tablet (75 mg total) by mouth daily, Disp: 90 tablet, Rfl: 3    losartan (COZAAR) 100 MG tablet, Take 1 tablet (100 mg total) by mouth daily, Disp: 90 tablet, Rfl: 3    metoprolol succinate (TOPROL-XL) 25 mg 24 hr tablet, Take 1 tablet (25 mg total) by mouth daily, Disp: 90 tablet, Rfl: 3    pantoprazole (PROTONIX) 40 mg tablet, Take 1 tablet (40 mg total) by mouth daily in the early morning, Disp: 90 tablet, Rfl: 3    HYDROcodone-acetaminophen (NORCO) 5-325 mg per tablet, Take 1 tablet by mouth every 6 (six) hours as needed for pain for up to 12 dosesMax Daily Amount: 4 tablets (Patient not taking: Reported on 6/17/2021), Disp: 12 tablet, Rfl: 0    nitroglycerin (NITROSTAT) 0 4 mg SL tablet, Place 1 tablet (0 4 mg total) under the tongue every 5 (five) minutes as needed for chest pain (Patient not taking: Reported on 9/29/2020), Disp: 20 tablet, Rfl: 0    rosuvastatin (CRESTOR) 10 MG tablet, Take 1 tablet (10 mg total) by mouth daily, Disp: 90 tablet, Rfl: 3    varenicline (CHANTIX MARYLOU) 0 5 MG X 11 & 1 MG X 42 tablet, Take one 0 5 mg tablet by mouth once daily for 3 days, then one 0 5 mg tablet by mouth twice daily for 4 days, then one 1 mg tablet by mouth twice daily   (Patient not taking: Reported on 6/17/2021), Disp: 53 tablet, Rfl: 0      Wt Readings from Last 3 Encounters:   06/17/21 101 kg (223 lb 9 6 oz)   12/18/20 104 kg (230 lb)   11/24/20 99 8 kg (220 lb)     Temp Readings from Last 3 Encounters:   02/11/21 (!) 97 3 °F (36 3 °C) (Tympanic)   11/24/20 98 2 °F (36 8 °C) (Temporal)   09/29/20 98 °F (36 7 °C) (Temporal)     BP Readings from Last 3 Encounters:   06/17/21 136/98   02/11/21 163/86   12/18/20 136/92     Pulse Readings from Last 3 Encounters:   21 82   21 66   20 80         Physical Exam  HENT:      Head: Atraumatic  Mouth/Throat:      Mouth: Mucous membranes are moist    Eyes:      Extraocular Movements: Extraocular movements intact  Cardiovascular:      Rate and Rhythm: Normal rate and regular rhythm  Heart sounds: Normal heart sounds  Pulmonary:      Effort: Pulmonary effort is normal       Breath sounds: Normal breath sounds  Abdominal:      General: Abdomen is flat  Musculoskeletal:         General: Normal range of motion  Cervical back: Normal range of motion  Skin:     General: Skin is warm and dry  Neurological:      General: No focal deficit present  Mental Status: He is alert and oriented to person, place, and time     Psychiatric:         Mood and Affect: Mood normal          Behavior: Behavior normal            Laboratory Studies:  CMP:  Lab Results   Component Value Date    K 3 4 (L) 2020     2020    CO2 28 2020    BUN 11 2020    CREATININE 1 44 (H) 2020    AST 23 2020    ALT 56 2020    EGFR 60 2020       Lipid Profile:   No results found for: CHOL  Lab Results   Component Value Date    HDL 24 (L) 2020     Lab Results   Component Value Date    LDLCALC 128 (H) 2020     Lab Results   Component Value Date    TRIG 222 (H) 2020       Cardiac testing:     Results for orders placed during the hospital encounter of 20    Echo complete with contrast if indicated    Narrative  [address] [place] [address]    Transthoracic Echocardiogram  2D, M-mode, Doppler, and Color Doppler    Study date:  02-Sep-2020    Patient: Kae Robison  MR number: [id number]  Account number: [de-identified]  : 1980  Age: 38 years  Gender: Male  Status: Inpatient  Location: [address] [place] [address]  Height: 70 in  Weight: 224 6 lb  BP: 159/ 118 mmHg    Indications: Chest pain    Diagnoses: R07 9 - Chest pain, unspecified    Sonographer:  CHARLES Craig  Primary Physician:  Karishma Good MD  Referring Physician:  TERRENCE Paz  Group:  [facility]  Interpreting Physician:  Wilfrido Ramirez MD    SUMMARY    LEFT VENTRICLE:  Size was normal   Systolic function was normal  Ejection fraction was estimated to be 55 %  There was moderate hypokinesis of the basal-mid inferior wall  Wall thickness was mildly increased  Doppler parameters were consistent with abnormal left ventricular relaxation (grade 1 diastolic dysfunction)  RIGHT VENTRICLE:  The size was normal   Systolic function was normal     TRICUSPID VALVE:  There was trace regurgitation  HISTORY: PRIOR HISTORY: MI; HTN; GERD; Smoker; CKD 3; Chronic pain    PROCEDURE: The study was performed in the [facility]  This was a routine study  The transthoracic approach was used  The study included complete 2D imaging, M-mode, complete spectral Doppler, and color Doppler  Echocardiographic  views were limited due to decreased penetration and lung interference  This was a technically difficult study  LEFT VENTRICLE: Size was normal  Systolic function was normal  Ejection fraction was estimated to be 55 %  There were no regional wall motion abnormalities  There was moderate hypokinesis of the basal-mid inferior wall  Wall thickness was  mildly increased  DOPPLER: Doppler parameters were consistent with abnormal left ventricular relaxation (grade 1 diastolic dysfunction)  RIGHT VENTRICLE: The size was normal  Systolic function was normal  Wall thickness was normal     LEFT ATRIUM: Size was normal     RIGHT ATRIUM: Size was normal     MITRAL VALVE: Valve structure was normal  There was normal leaflet separation  DOPPLER: The transmitral velocity was within the normal range  There was no evidence for stenosis  There was no regurgitation  AORTIC VALVE: The valve was trileaflet   Leaflets exhibited normal thickness and normal cuspal separation  DOPPLER: Transaortic velocity was within the normal range  There was no evidence for stenosis  There was no regurgitation  TRICUSPID VALVE: The valve structure was normal  There was normal leaflet separation  DOPPLER: The transtricuspid velocity was within the normal range  There was no evidence for stenosis  There was trace regurgitation  The tricuspid jet  envelope definition was inadequate for estimation of RV systolic pressure  There are no indirect findings suggestive of moderate or severe pulmonary hypertension  PULMONIC VALVE: Leaflets exhibited normal thickness, no calcification, and normal cuspal separation  DOPPLER: The transpulmonic velocity was within the normal range  There was no regurgitation  PERICARDIUM: There was no pericardial effusion  The pericardium was normal in appearance  AORTA: The root exhibited normal size  SYSTEMIC VEINS: IVC: The inferior vena cava was not well visualized      SYSTEM MEASUREMENT TABLES    2D  %FS: 37 9 %  AV Diam: 3 06 cm  EDV(Teich): 140 42 ml  EF Biplane: 46 65 %  EF(Teich): 67 54 %  ESV(Teich): 45 58 ml  IVSd: 1 03 cm  LA Area: 17 68 cm2  LA Diam: 4 2 cm  LVEDV MOD A2C: 102 51 ml  LVEDV MOD A4C: 115 51 ml  LVEDV MOD BP: 113 43 ml  LVEF MOD A2C: 50 6 %  LVEF MOD A4C: 40 28 %  LVESV MOD A2C: 50 64 ml  LVESV MOD A4C: 68 99 ml  LVESV MOD BP: 60 52 ml  LVIDd: 5 39 cm  LVIDs: 3 34 cm  LVLd A2C: 8 42 cm  LVLd A4C: 7 61 cm  LVLs A2C: 6 95 cm  LVLs A4C: 7 35 cm  LVPWd: 1 04 cm  RA Area: 13 53 cm2  RVIDd: 3 15 cm  SV MOD A2C: 51 87 ml  SV MOD A4C: 46 52 ml  SV(Teich): 94 84 ml    CW  TR Vmax: 2 31 m/s  TR maxP 32 mmHg    MM  TAPSE: 1 62 cm    PW  E': 0 05 m/s  E/E': 11 01  MV A Ayaan: 0 67 m/s  MV Dec [place]: 2 28 m/s2  MV DecT: 235 02 ms  MV E Aayan: 0 54 m/s  MV E/A Ratio: 0 8  MV PHT: 68 16 ms  MVA By PHT: 3 23 cm2    [employer] Accredited Echocardiography Laboratory    Prepared and electronically signed by    Tasneem Szymanski MD  Signed 02-Sep-2020 15:18:04    No results found for this or any previous visit  Results for orders placed during the hospital encounter of 20    Cardiac catheterization    Narrative  [address]  [address] [place] [address]  [phone]    [facility]    Invasive Cardiovascular Lab Complete Report    Patient: Eric Burrell  MR number: [id number]  Account number: [de-identified]  Study date: 2020  Gender: Male  : 1980  Height: 70 1 in  Weight: 224 4 lb  BSA: 2 2 mï¾²    Allergies: NO KNOWN ALLERGIES    Diagnostic Cardiologist:  Darlene Garduno MD  Primary Physician:  Martha Romero MD    SUMMARY    CORONARY CIRCULATION:  Right PDA: There was a 90 % stenosis in the distal third of the vessel segment  This lesion is a likely culprit for the patient's recent myocardial infarction  It does not appear amenable to intervention  CARDIAC STRUCTURES:  Analysis of regional contractile function demonstrated mild diaphragmatic hypokinesis  (distal/apical)  Global left ventricular function was normal  EF calculated by contrast ventriculography was 60 %  INDICATIONS:  --  Possible CAD: myocardial infarction without ST elevation (NSTEMI)  PROCEDURES PERFORMED    --  Left heart catheterization with ventriculography  --  Left coronary angiography  --  Right coronary angiography  --  Inpatient  --  Mod Sedation Same Physician Initial 15min  --  Coronary Catheterization (w/ LHC)  --  Mod Sedation Same Physician Add 15min  PROCEDURE: The risks and alternatives of the procedures and conscious sedation were explained to the patient and informed consent was obtained  The patient was brought to the cath lab and placed on the table  The planned puncture sites  were prepped and draped in the usual sterile fashion  --  Right radial artery access   After performing an Christopher's test to verify adequate ulnar artery supply to the hand, the radial site was prepped  The puncture site was infiltrated with local anesthetic  The vessel was accessed using the  modified Seldinger technique, a wire was advanced into the vessel, and a sheath was advanced over the wire into the vessel  --  Left heart catheterization with ventriculography  A catheter was advanced over a guidewire into the ascending aorta  After recording ascending aortic pressure, the catheter was advanced across the aortic valve and left ventricular  pressure was recorded  Ventriculography was performed  The catheter was pulled back across the aortic valve and into the ascending aorta and pullback pressures were obtained  --  Left coronary artery angiography  A catheter was advanced over a guidewire into the aorta and positioned in the left coronary artery ostium under fluoroscopic guidance  Angiography was performed  --  Right coronary artery angiography  A catheter was advanced over a guidewire into the aorta and positioned in the right coronary artery ostium under fluoroscopic guidance  Angiography was performed  --  Inpatient  --  Mod Sedation Same Physician Initial 15min  --  Coronary Catheterization (w/ LHC)  --  Mod Sedation Same Physician Add 15min  PROCEDURE COMPLETION: The patient tolerated the procedure well and was discharged from the cath lab  TIMING: Test started at 11:07  Test concluded at 11:24  HEMOSTASIS: The sheath was removed  The site was compressed with a Hemoband  device  Hemostasis was obtained  MEDICATIONS GIVEN: 1% Lidocaine, 1 ml, subcutaneously, at 11:08  Heparin 1000 units/ml, 4,000 units, IV, at 11:09  Verapamil (5mg/2ml), 2 5 mg, IV, at 11:09  Nitroglycerin (200mcg/ml), 200 mcg, at 11:09  Versed (2mg/2ml), 1 mg, IV, at 11:11  Fentanyl (1OOmcg/2 ml), 50 mcg, IV, at 11:11  CONTRAST GIVEN: 85 ml Omnipaque (350mg I /ml)  36 ml Omnipaque (350mg I /ml)  RADIATION EXPOSURE: Fluoroscopy time: 2 3 min      HEMODYNAMICS: Hemodynamic assessment demonstrated normal LVEDP  VENTRICLES:   --  Analysis of regional contractile function demonstrated mild diaphragmatic hypokinesis  (distal/apical) Global left ventricular function was normal  EF calculated by contrast ventriculography was 60 %  VALVES:  AORTIC VALVE:   --  There was no aortic stenosis  MITRAL VALVE:   --  The mitral valve exhibited no regurgitation  CORONARY VESSELS:   --  The coronary circulation is right dominant  --  Left main: 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  --  LAD: The vessel was medium to large sized  Angiography showed mild atherosclerosis  --  Mid LAD: There was a tubular 40 % stenosis  --  Circumflex: The vessel was large sized  Angiography showed no evidence of disease  There were two major obtuse marginals  --  RCA: The vessel was medium sized and moderately tortuous  --  Right PDA: The vessel was small sized  There was a 90 % stenosis in the distal third of the vessel segment  This lesion is a likely culprit for the patient's recent myocardial infarction  It does not appear amenable to intervention  IMPRESSIONS:  There is significant single vessel coronary artery disease  type 1 MI    RECOMMENDATIONS  Patient management should include adding lipid lowering therapy  Patient management should include an exercise program, smoking cessation, and aggressive risk factor modification  DISPOSITION:  The patient left the catheterization laboratory in stable condition  Prepared and signed by    Isreal Hernández MD  Signed 09/03/2020 11:31:04    Study diagram    Angiographic findings  Native coronary lesions:  ï¾·Mid LAD: Lesion 1: tubular, 40 % stenosis  ï¾·RPDA: Lesion 1: 90 % stenosis      Hemodynamic tables    Pressures:  Baseline  Pressures:  - HR: 91  Pressures:  - Rhythm:  Pressures:  -- Aortic Pressure (S/D/M): 143/98/120  Pressures:  -- Left Ventricle (s/edp): 151/15/--    Outputs: Baseline  Outputs:  -- CALCULATIONS: [age]  Outputs:  -- CALCULATIONS: Body Surface Area: 2 20  Outputs:  -- CALCULATIONS: Height in cm: 178 00  Outputs:  -- CALCULATIONS: Sex: Male  Outputs:  -- CALCULATIONS: Weight in k 00    No results found for this or any previous visit

## 2021-06-17 NOTE — PATIENT INSTRUCTIONS
Recommendations:  1  Switch atorvastatin to rosuvastatin 10 mg daily  2  Continue remainder of medications  3  Check fasting lipid panel and complete metabolic profile  4  Follow up in 6 months

## 2021-09-09 DIAGNOSIS — I25.10 CORONARY ARTERY DISEASE INVOLVING NATIVE CORONARY ARTERY OF NATIVE HEART WITHOUT ANGINA PECTORIS: ICD-10-CM

## 2021-09-09 DIAGNOSIS — I10 ESSENTIAL HYPERTENSION: ICD-10-CM

## 2021-09-09 DIAGNOSIS — I16.0 HYPERTENSIVE URGENCY: ICD-10-CM

## 2021-09-09 DIAGNOSIS — I21.4 TYPE 1 NON-ST ELEVATION MYOCARDIAL INFARCTION (NSTEMI) (HCC): ICD-10-CM

## 2021-09-09 RX ORDER — METOPROLOL SUCCINATE 25 MG/1
25 TABLET, EXTENDED RELEASE ORAL DAILY
Qty: 90 TABLET | Refills: 3 | Status: SHIPPED | OUTPATIENT
Start: 2021-09-09

## 2021-09-09 RX ORDER — CLOPIDOGREL BISULFATE 75 MG/1
75 TABLET ORAL DAILY
Qty: 90 TABLET | Refills: 3 | Status: SHIPPED | OUTPATIENT
Start: 2021-09-09 | End: 2022-01-24

## 2021-09-09 RX ORDER — AMLODIPINE BESYLATE 5 MG/1
5 TABLET ORAL DAILY
Qty: 90 TABLET | Refills: 3 | Status: SHIPPED | OUTPATIENT
Start: 2021-09-09

## 2021-09-21 ENCOUNTER — HOSPITAL ENCOUNTER (EMERGENCY)
Facility: HOSPITAL | Age: 41
Discharge: HOME | End: 2021-09-21
Attending: EMERGENCY MEDICINE
Payer: COMMERCIAL

## 2021-09-21 VITALS
TEMPERATURE: 98 F | HEIGHT: 70 IN | DIASTOLIC BLOOD PRESSURE: 97 MMHG | HEART RATE: 77 BPM | WEIGHT: 222.66 LBS | OXYGEN SATURATION: 100 % | BODY MASS INDEX: 31.88 KG/M2 | RESPIRATION RATE: 17 BRPM | SYSTOLIC BLOOD PRESSURE: 171 MMHG

## 2021-09-21 DIAGNOSIS — K08.89 PAIN, DENTAL: Primary | ICD-10-CM

## 2021-09-21 PROCEDURE — 96372 THER/PROPH/DIAG INJ SC/IM: CPT

## 2021-09-21 PROCEDURE — 99282 EMERGENCY DEPT VISIT SF MDM: CPT

## 2021-09-21 PROCEDURE — 99283 EMERGENCY DEPT VISIT LOW MDM: CPT | Performed by: EMERGENCY MEDICINE

## 2021-09-21 RX ORDER — CHLORHEXIDINE GLUCONATE 0.12 MG/ML
15 RINSE ORAL 2 TIMES DAILY
Qty: 120 ML | Refills: 0 | Status: SHIPPED | OUTPATIENT
Start: 2021-09-21

## 2021-09-21 RX ORDER — OXYCODONE HYDROCHLORIDE 5 MG/1
5 TABLET ORAL ONCE
Status: DISCONTINUED | OUTPATIENT
Start: 2021-09-21 | End: 2021-09-21

## 2021-09-21 RX ORDER — KETOROLAC TROMETHAMINE 30 MG/ML
30 INJECTION, SOLUTION INTRAMUSCULAR; INTRAVENOUS ONCE
Status: COMPLETED | OUTPATIENT
Start: 2021-09-21 | End: 2021-09-21

## 2021-09-21 RX ORDER — LIDOCAINE HYDROCHLORIDE 20 MG/ML
15 SOLUTION OROPHARYNGEAL ONCE
Status: COMPLETED | OUTPATIENT
Start: 2021-09-21 | End: 2021-09-21

## 2021-09-21 RX ORDER — HYDROCODONE BITARTRATE AND ACETAMINOPHEN 5; 325 MG/1; MG/1
1 TABLET ORAL EVERY 6 HOURS PRN
Qty: 4 TABLET | Refills: 0 | Status: SHIPPED | OUTPATIENT
Start: 2021-09-21 | End: 2021-10-02

## 2021-09-21 RX ADMIN — KETOROLAC TROMETHAMINE 30 MG: 30 INJECTION, SOLUTION INTRAMUSCULAR; INTRAVENOUS at 03:02

## 2021-09-21 RX ADMIN — LIDOCAINE HYDROCHLORIDE 15 ML: 20 SOLUTION ORAL; TOPICAL at 03:01

## 2021-09-21 NOTE — ED TRIAGE NOTES
Pt presents to the ED with c o upper right dental pain  States that he cracked the tooth at the beginning of covid, saw a dentist a few days prior and was prescribed steroids and antibiotics  States that he is scheduled top have the tooth repaired and that tylenol is not helping with the pain tonight

## 2021-09-21 NOTE — ED PROVIDER NOTES
History  Chief Complaint   Patient presents with    Dental Pain     78-year-old male with past medical history of CAD, previous NSTEMI on aspirin and Plavix hypertension, hyperlipidemia, GERD presents for evaluation of dental pain  No trouble swallowing, no trouble breathing, no facial swelling  Had broken right upper tooth prior to COVID, saw dentist earlier this week started on steroids and antibiotics which he is currently taking, Tylenol not helping his pain tonight, does have appointment with the dentist to have the tooth removed  Prior to Admission Medications   Prescriptions Last Dose Informant Patient Reported? Taking?    HYDROcodone-acetaminophen (NORCO) 5-325 mg per tablet  Self No No   Sig: Take 1 tablet by mouth every 6 (six) hours as needed for pain for up to 12 dosesMax Daily Amount: 4 tablets   Patient not taking: Reported on 6/17/2021   amLODIPine (NORVASC) 5 mg tablet   No No   Sig: Take 1 tablet (5 mg total) by mouth daily   aspirin (ECOTRIN LOW STRENGTH) 81 mg EC tablet  Self No No   Sig: Take 1 tablet (81 mg total) by mouth daily   clopidogrel (PLAVIX) 75 mg tablet   No No   Sig: Take 1 tablet (75 mg total) by mouth daily   losartan (COZAAR) 100 MG tablet  Self No No   Sig: Take 1 tablet (100 mg total) by mouth daily   metoprolol succinate (TOPROL-XL) 25 mg 24 hr tablet   No No   Sig: Take 1 tablet (25 mg total) by mouth daily   nitroglycerin (NITROSTAT) 0 4 mg SL tablet  Self No No   Sig: Place 1 tablet (0 4 mg total) under the tongue every 5 (five) minutes as needed for chest pain   Patient not taking: Reported on 9/29/2020   pantoprazole (PROTONIX) 40 mg tablet  Self No No   Sig: Take 1 tablet (40 mg total) by mouth daily in the early morning   rosuvastatin (CRESTOR) 10 MG tablet   No No   Sig: Take 1 tablet (10 mg total) by mouth daily   varenicline (CHANTIX MARYLOU) 0 5 MG X 11 & 1 MG X 42 tablet  Self No No   Sig: Take one 0 5 mg tablet by mouth once daily for 3 days, then one 0 5 mg tablet by mouth twice daily for 4 days, then one 1 mg tablet by mouth twice daily  Patient not taking: Reported on 6/17/2021      Facility-Administered Medications: None       Past Medical History:   Diagnosis Date    Chronic pain     GERD (gastroesophageal reflux disease)     Hypertension        Past Surgical History:   Procedure Laterality Date    BACK SURGERY      CARDIAC CATHETERIZATION      NO PAST SURGERIES         History reviewed  No pertinent family history  I have reviewed and agree with the history as documented  E-Cigarette/Vaping    E-Cigarette Use Never User      E-Cigarette/Vaping Substances    Nicotine No     Flavoring No      Social History     Tobacco Use    Smoking status: Current Every Day Smoker     Packs/day: 1 00    Smokeless tobacco: Never Used   Vaping Use    Vaping Use: Never used   Substance Use Topics    Alcohol use: Yes     Comment: social    Drug use: Never       Review of Systems   Constitutional: Negative for chills and fever  HENT: Positive for dental problem  Negative for facial swelling, rhinorrhea, sore throat, trouble swallowing and voice change  Respiratory: Negative for cough and shortness of breath  Cardiovascular: Negative for chest pain and palpitations  Gastrointestinal: Negative for abdominal pain, nausea and vomiting  Genitourinary: Negative for dysuria, frequency and urgency  Physical Exam  Physical Exam  Vitals and nursing note reviewed  Constitutional:       Appearance: He is well-developed  HENT:      Head: Normocephalic and atraumatic  Mouth/Throat:        Comments: No trismus, no floor of the mouth tenderness, no facial swelling  Cardiovascular:      Rate and Rhythm: Normal rate and regular rhythm  Heart sounds: Normal heart sounds  Pulmonary:      Effort: Pulmonary effort is normal       Breath sounds: Normal breath sounds  Abdominal:      General: There is no distension  Palpations: Abdomen is soft  Tenderness: There is no abdominal tenderness  Skin:     General: Skin is warm and dry  Neurological:      Mental Status: He is alert and oriented to person, place, and time  Vital Signs  ED Triage Vitals [09/21/21 0248]   Temperature Pulse Respirations Blood Pressure SpO2   98 °F (36 7 °C) 77 17 (!) 171/97 97 %      Temp src Heart Rate Source Patient Position - Orthostatic VS BP Location FiO2 (%)   -- Monitor Sitting Right arm --      Pain Score       7           Vitals:    09/21/21 0248   BP: (!) 171/97   Pulse: 77   Patient Position - Orthostatic VS: Sitting         Visual Acuity      ED Medications  Medications   ketorolac (TORADOL) injection 30 mg (30 mg Intramuscular Given 9/21/21 0302)   Lidocaine Viscous HCl (XYLOCAINE) 2 % mucosal solution 15 mL (15 mL Swish & Spit Given 9/21/21 0301)       Diagnostic Studies  Results Reviewed     None                 No orders to display              Procedures  Procedures         ED Course  ED Course as of Sep 21 0310   Tue Sep 21, 2021   0309 As I was discharging the patient he stated that stated that approximately 2 weeks ago he possibly had some glass in bed in his right forearm, noticed a small pimple in his right AC  On evaluation there appears to be a small raised comedone right AC, no surrounding erythema, no surrounding inflammatory change on ultrasound no obvious foreign body, no abscess, no more blink to suggest cellulitis    Discussed careful return precautions and supportive care with warm compresses                                              MDM  Number of Diagnoses or Management Options  Pain, dental  Diagnosis management comments: 62-year-old male presents with dental pain, no facial swelling, no voice change, will treat symptomatically start antibiotics, dental follow-up      Disposition  Final diagnoses:   Pain, dental     Time reflects when diagnosis was documented in both MDM as applicable and the Disposition within this note     Time User Action Codes Description Comment    9/21/2021  2:59 AM Moe Player Add [K08 89] Pain, dental       ED Disposition     ED Disposition Condition Date/Time Comment    Discharge Stable Tue Sep 21, 2021  2:58 AM Mi Stanley discharge to home/self care  Follow-up Information     Follow up With Specialties Details Why Contact Info Additional Information    Jessica Greenfield MD Internal Medicine Schedule an appointment as soon as possible for a visit   [address] [facility] [phone]        [address] Emergency Department Emergency Medicine  If symptoms worsen [address] [place], [phone]  [address] [facility] Emergency Department, [address] [place] [address], Guilherme Blazer, Kayige Ellis 10          Patient's Medications   Discharge Prescriptions    CHLORHEXIDINE (PERIDEX) 0 12 % SOLUTION    Apply 15 mL to the mouth or throat 2 (two) times a day       Start Date: 9/21/2021 End Date: --       Order Dose: 15 mL       Quantity: 120 mL    Refills: 0    HYDROCODONE-ACETAMINOPHEN (NORCO) 5-325 MG PER TABLET    Take 1 tablet by mouth every 6 (six) hours as needed for pain for up to 10 daysMax Daily Amount: 4 tablets       Start Date: 9/21/2021 End Date: 10/1/2021       Order Dose: 1 tablet       Quantity: 4 tablet    Refills: 0     No discharge procedures on file      PDMP Review       Value Time User    PDMP Reviewed  Yes 2/11/2021  1:47 PM Alina Andrade DO          ED Provider  Electronically Signed by           Edward Gold DO  09/21/21 [address], DO  09/21/21 6438

## 2021-09-21 NOTE — DISCHARGE INSTRUCTIONS
Please follow-up with your dentist for further care, if symptoms worsen please return to the emergency department

## 2021-10-01 ENCOUNTER — APPOINTMENT (EMERGENCY)
Dept: RADIOLOGY | Facility: HOSPITAL | Age: 41
End: 2021-10-01
Payer: COMMERCIAL

## 2021-10-01 ENCOUNTER — HOSPITAL ENCOUNTER (EMERGENCY)
Facility: HOSPITAL | Age: 41
Discharge: HOME | End: 2021-10-01
Attending: EMERGENCY MEDICINE
Payer: COMMERCIAL

## 2021-10-01 VITALS
TEMPERATURE: 98 F | OXYGEN SATURATION: 99 % | WEIGHT: 222 LBS | HEART RATE: 80 BPM | BODY MASS INDEX: 31.85 KG/M2 | SYSTOLIC BLOOD PRESSURE: 156 MMHG | RESPIRATION RATE: 18 BRPM | DIASTOLIC BLOOD PRESSURE: 88 MMHG

## 2021-10-01 DIAGNOSIS — K21.9 GERD (GASTROESOPHAGEAL REFLUX DISEASE): Primary | ICD-10-CM

## 2021-10-01 LAB
ALBUMIN SERPL BCP-MCNC: 3.7 G/DL (ref 3.5–5)
ALP SERPL-CCNC: 78 U/L (ref 46–116)
ALT SERPL W P-5'-P-CCNC: 53 U/L (ref 12–78)
ANION GAP SERPL CALCULATED.3IONS-SCNC: 5 MMOL/L (ref 4–13)
AST SERPL W P-5'-P-CCNC: 21 U/L (ref 5–45)
ATRIAL RATE: 78 BPM
BASOPHILS # BLD AUTO: 0.07 THOUSANDS/ΜL (ref 0–0.1)
BASOPHILS NFR BLD AUTO: 1 % (ref 0–1)
BILIRUB SERPL-MCNC: 0.4 MG/DL (ref 0.2–1)
BUN SERPL-MCNC: 20 MG/DL (ref 5–25)
CALCIUM SERPL-MCNC: 8.7 MG/DL (ref 8.3–10.1)
CHLORIDE SERPL-SCNC: 102 MMOL/L (ref 100–108)
CO2 SERPL-SCNC: 34 MMOL/L (ref 21–32)
CREAT SERPL-MCNC: 1.39 MG/DL (ref 0.6–1.3)
EOSINOPHIL # BLD AUTO: 0.32 THOUSAND/ΜL (ref 0–0.61)
EOSINOPHIL NFR BLD AUTO: 2 % (ref 0–6)
ERYTHROCYTE [DISTWIDTH] IN BLOOD BY AUTOMATED COUNT: 13.6 % (ref 11.6–15.1)
GFR SERPL CREATININE-BSD FRML MDRD: 62 ML/MIN/1.73SQ M
GLUCOSE SERPL-MCNC: 97 MG/DL (ref 65–140)
HCT VFR BLD AUTO: 47.9 % (ref 36.5–49.3)
HGB BLD-MCNC: 15.7 G/DL (ref 12–17)
IMM GRANULOCYTES # BLD AUTO: 0.06 THOUSAND/UL (ref 0–0.2)
IMM GRANULOCYTES NFR BLD AUTO: 0 % (ref 0–2)
LIPASE SERPL-CCNC: 225 U/L (ref 73–393)
LYMPHOCYTES # BLD AUTO: 4.48 THOUSANDS/ΜL (ref 0.6–4.47)
LYMPHOCYTES NFR BLD AUTO: 30 % (ref 14–44)
MCH RBC QN AUTO: 27.7 PG (ref 26.8–34.3)
MCHC RBC AUTO-ENTMCNC: 32.8 G/DL (ref 31.4–37.4)
MCV RBC AUTO: 85 FL (ref 82–98)
MONOCYTES # BLD AUTO: 1.3 THOUSAND/ΜL (ref 0.17–1.22)
MONOCYTES NFR BLD AUTO: 9 % (ref 4–12)
NEUTROPHILS # BLD AUTO: 8.81 THOUSANDS/ΜL (ref 1.85–7.62)
NEUTS SEG NFR BLD AUTO: 58 % (ref 43–75)
NRBC BLD AUTO-RTO: 0 /100 WBCS
P AXIS: 57 DEGREES
PLATELET # BLD AUTO: 284 THOUSANDS/UL (ref 149–390)
PMV BLD AUTO: 9.6 FL (ref 8.9–12.7)
POTASSIUM SERPL-SCNC: 3.5 MMOL/L (ref 3.5–5.3)
PR INTERVAL: 150 MS
PROT SERPL-MCNC: 6.9 G/DL (ref 6.4–8.2)
QRS AXIS: 37 DEGREES
QRSD INTERVAL: 92 MS
QT INTERVAL: 376 MS
QTC INTERVAL: 428 MS
RBC # BLD AUTO: 5.66 MILLION/UL (ref 3.88–5.62)
SODIUM SERPL-SCNC: 141 MMOL/L (ref 136–145)
T WAVE AXIS: 64 DEGREES
TROPONIN I SERPL-MCNC: <0.02 NG/ML
VENTRICULAR RATE: 78 BPM
WBC # BLD AUTO: 15.04 THOUSAND/UL (ref 4.31–10.16)

## 2021-10-01 PROCEDURE — 93010 ELECTROCARDIOGRAM REPORT: CPT | Performed by: INTERNAL MEDICINE

## 2021-10-01 PROCEDURE — 96374 THER/PROPH/DIAG INJ IV PUSH: CPT

## 2021-10-01 PROCEDURE — 85025 COMPLETE CBC W/AUTO DIFF WBC: CPT | Performed by: EMERGENCY MEDICINE

## 2021-10-01 PROCEDURE — 83690 ASSAY OF LIPASE: CPT | Performed by: EMERGENCY MEDICINE

## 2021-10-01 PROCEDURE — 99284 EMERGENCY DEPT VISIT MOD MDM: CPT

## 2021-10-01 PROCEDURE — 36415 COLL VENOUS BLD VENIPUNCTURE: CPT | Performed by: EMERGENCY MEDICINE

## 2021-10-01 PROCEDURE — 80053 COMPREHEN METABOLIC PANEL: CPT | Performed by: EMERGENCY MEDICINE

## 2021-10-01 PROCEDURE — 71045 X-RAY EXAM CHEST 1 VIEW: CPT

## 2021-10-01 PROCEDURE — 99284 EMERGENCY DEPT VISIT MOD MDM: CPT | Performed by: EMERGENCY MEDICINE

## 2021-10-01 PROCEDURE — 84484 ASSAY OF TROPONIN QUANT: CPT | Performed by: EMERGENCY MEDICINE

## 2021-10-01 PROCEDURE — 93005 ELECTROCARDIOGRAM TRACING: CPT

## 2021-10-01 PROCEDURE — 99283 EMERGENCY DEPT VISIT LOW MDM: CPT

## 2021-10-01 RX ORDER — LIDOCAINE HYDROCHLORIDE 20 MG/ML
15 SOLUTION OROPHARYNGEAL ONCE
Status: COMPLETED | OUTPATIENT
Start: 2021-10-01 | End: 2021-10-01

## 2021-10-01 RX ORDER — MAGNESIUM HYDROXIDE/ALUMINUM HYDROXICE/SIMETHICONE 120; 1200; 1200 MG/30ML; MG/30ML; MG/30ML
30 SUSPENSION ORAL ONCE
Status: COMPLETED | OUTPATIENT
Start: 2021-10-01 | End: 2021-10-01

## 2021-10-01 RX ADMIN — FAMOTIDINE 20 MG: 10 INJECTION INTRAVENOUS at 15:21

## 2021-10-01 RX ADMIN — LIDOCAINE HYDROCHLORIDE 15 ML: 20 SOLUTION ORAL; TOPICAL at 15:22

## 2021-10-01 RX ADMIN — ALUMINUM HYDROXIDE, MAGNESIUM HYDROXIDE, AND SIMETHICONE 30 ML: 200; 200; 20 SUSPENSION ORAL at 15:22

## 2021-10-02 ENCOUNTER — HOSPITAL ENCOUNTER (EMERGENCY)
Facility: HOSPITAL | Age: 41
Discharge: HOME | End: 2021-10-02
Attending: EMERGENCY MEDICINE
Payer: COMMERCIAL

## 2021-10-02 VITALS
WEIGHT: 222 LBS | HEIGHT: 70 IN | BODY MASS INDEX: 31.78 KG/M2 | TEMPERATURE: 97.5 F | OXYGEN SATURATION: 98 % | DIASTOLIC BLOOD PRESSURE: 76 MMHG | RESPIRATION RATE: 18 BRPM | HEART RATE: 64 BPM | SYSTOLIC BLOOD PRESSURE: 124 MMHG

## 2021-10-02 DIAGNOSIS — Z71.6 ENCOUNTER FOR SMOKING CESSATION COUNSELING: ICD-10-CM

## 2021-10-02 DIAGNOSIS — K12.2 UVULITIS: ICD-10-CM

## 2021-10-02 DIAGNOSIS — K29.70 GASTRITIS: Primary | ICD-10-CM

## 2021-10-02 RX ORDER — NICOTINE 21 MG/24HR
1 PATCH, TRANSDERMAL 24 HOURS TRANSDERMAL EVERY 24 HOURS
Qty: 28 PATCH | Refills: 0 | Status: SHIPPED | OUTPATIENT
Start: 2021-10-02

## 2021-10-02 RX ORDER — SUCRALFATE 1 G/1
1 TABLET ORAL ONCE
Status: COMPLETED | OUTPATIENT
Start: 2021-10-02 | End: 2021-10-02

## 2021-10-02 RX ORDER — PHENOL 1.4 %
1 AEROSOL, SPRAY (ML) MUCOUS MEMBRANE EVERY 2 HOUR PRN
Qty: 177 ML | Refills: 0 | Status: SHIPPED | OUTPATIENT
Start: 2021-10-02

## 2021-10-02 RX ORDER — SUCRALFATE ORAL 1 G/10ML
1 SUSPENSION ORAL 4 TIMES DAILY PRN
Qty: 420 ML | Refills: 0 | Status: SHIPPED | OUTPATIENT
Start: 2021-10-02

## 2021-10-02 RX ADMIN — SUCRALFATE 1 G: 1 TABLET ORAL at 01:44

## 2021-12-02 DIAGNOSIS — I21.4 TYPE 1 NON-ST ELEVATION MYOCARDIAL INFARCTION (NSTEMI) (HCC): ICD-10-CM

## 2021-12-02 DIAGNOSIS — I16.0 HYPERTENSIVE URGENCY: ICD-10-CM

## 2021-12-02 RX ORDER — LOSARTAN POTASSIUM 100 MG/1
100 TABLET ORAL DAILY
Qty: 90 TABLET | Refills: 3 | Status: SHIPPED | OUTPATIENT
Start: 2021-12-02

## 2022-01-14 DIAGNOSIS — K21.9 GASTROESOPHAGEAL REFLUX DISEASE WITHOUT ESOPHAGITIS: ICD-10-CM

## 2022-01-14 RX ORDER — PANTOPRAZOLE SODIUM 40 MG/1
40 TABLET, DELAYED RELEASE ORAL
Qty: 90 TABLET | Refills: 3 | Status: SHIPPED | OUTPATIENT
Start: 2022-01-14

## 2022-01-24 ENCOUNTER — OFFICE VISIT (OUTPATIENT)
Dept: CARDIOLOGY CLINIC | Facility: CLINIC | Age: 42
End: 2022-01-24
Payer: COMMERCIAL

## 2022-01-24 VITALS
WEIGHT: 230 LBS | HEIGHT: 70 IN | OXYGEN SATURATION: 99 % | SYSTOLIC BLOOD PRESSURE: 136 MMHG | BODY MASS INDEX: 32.93 KG/M2 | DIASTOLIC BLOOD PRESSURE: 68 MMHG | HEART RATE: 71 BPM

## 2022-01-24 DIAGNOSIS — I25.10 CORONARY ARTERY DISEASE INVOLVING NATIVE CORONARY ARTERY OF NATIVE HEART WITHOUT ANGINA PECTORIS: ICD-10-CM

## 2022-01-24 DIAGNOSIS — M54.6 CHRONIC THORACIC BACK PAIN, UNSPECIFIED BACK PAIN LATERALITY: ICD-10-CM

## 2022-01-24 DIAGNOSIS — G89.29 CHRONIC THORACIC BACK PAIN, UNSPECIFIED BACK PAIN LATERALITY: ICD-10-CM

## 2022-01-24 DIAGNOSIS — E78.2 MIXED HYPERLIPIDEMIA: ICD-10-CM

## 2022-01-24 DIAGNOSIS — I10 ESSENTIAL HYPERTENSION: Primary | ICD-10-CM

## 2022-01-24 PROBLEM — M54.9 BACK PAIN: Status: ACTIVE | Noted: 2022-01-24

## 2022-01-24 PROCEDURE — 99214 OFFICE O/P EST MOD 30 MIN: CPT | Performed by: INTERNAL MEDICINE

## 2022-01-24 NOTE — PROGRESS NOTES
Cardiology   Eitan Jones 42 y o  male MRN: [id number]        Impression:  1  CAD - dPDA 90% treated with medical management  Echo demonstrated EF 55% with inferior hypokinesis  2  Hypertension - borderline control    3  Dyslipidemia - on statin  4  Tobacco abuse - unable to tolerate Chantix   5  Musculoskeletal back pain - will refer to sports medicine       Recommendations:  1  Discontinue clopidogrel  2  Continue remainder of medications  3  Check fasting lipid panel and complete metabolic profile  4  Follow up in 6 months  HPI: iEtan Jones is a 42y o  year old male with NSTEMI 9/20, hypertension, dyslipidemia, who presents for follow up  Had a dPDA 90%, that was treated medically   No shortness of breath or palpitations  Has back pain - likely musculoskeletal   Related to muscle spasms in neck  Review of Systems   Constitutional: Negative  HENT: Negative  Eyes: Negative  Respiratory: Negative for chest tightness and shortness of breath  Cardiovascular: Negative for chest pain, palpitations and leg swelling  Gastrointestinal: Positive for abdominal pain  Endocrine: Negative  Genitourinary: Negative  Musculoskeletal: Positive for back pain  Skin: Negative  Allergic/Immunologic: Negative  Neurological: Negative  Hematological: Negative  Psychiatric/Behavioral: Negative  All other systems reviewed and are negative          Past Medical History:   Diagnosis Date    Chronic pain     GERD (gastroesophageal reflux disease)     Hypertension      Past Surgical History:   Procedure Laterality Date    BACK SURGERY      CARDIAC CATHETERIZATION      NO PAST SURGERIES       Social History     Substance and Sexual Activity   Alcohol Use Yes    Comment: social     Social History     Substance and Sexual Activity   Drug Use Never     Social History     Tobacco Use   Smoking Status Current Every Day Smoker    Packs/day: 1 00   Smokeless Tobacco Never Used     No family history on file      Allergies:  No Known Allergies    Medications:     Current Outpatient Medications:     amLODIPine (NORVASC) 5 mg tablet, Take 1 tablet (5 mg total) by mouth daily, Disp: 90 tablet, Rfl: 3    aspirin (ECOTRIN LOW STRENGTH) 81 mg EC tablet, Take 1 tablet (81 mg total) by mouth daily, Disp: 30 tablet, Rfl: 0    chlorhexidine (PERIDEX) 0 12 % solution, Apply 15 mL to the mouth or throat 2 (two) times a day, Disp: 120 mL, Rfl: 0    losartan (COZAAR) 100 MG tablet, Take 1 tablet (100 mg total) by mouth daily, Disp: 90 tablet, Rfl: 3    metoprolol succinate (TOPROL-XL) 25 mg 24 hr tablet, Take 1 tablet (25 mg total) by mouth daily, Disp: 90 tablet, Rfl: 3    nicotine (NICODERM CQ) 21 mg/24 hr TD 24 hr patch, Place 1 patch on the skin every 24 hours, Disp: 28 patch, Rfl: 0    nitroglycerin (NITROSTAT) 0 4 mg SL tablet, Place 1 tablet (0 4 mg total) under the tongue every 5 (five) minutes as needed for chest pain, Disp: 20 tablet, Rfl: 0    pantoprazole (PROTONIX) 40 mg tablet, Take 1 tablet (40 mg total) by mouth daily in the early morning, Disp: 90 tablet, Rfl: 3    phenol (Chloraseptic) 1 4 % mucosal liquid, Apply 1 spray to the mouth or throat every 2 (two) hours as needed (sore throat), Disp: 177 mL, Rfl: 0    rosuvastatin (CRESTOR) 10 MG tablet, Take 1 tablet (10 mg total) by mouth daily, Disp: 90 tablet, Rfl: 3    sucralfate (CARAFATE) 1 g/10 mL suspension, Take 10 mL (1 g total) by mouth 4 (four) times a day as needed (upset stomach), Disp: 420 mL, Rfl: 0      Wt Readings from Last 3 Encounters:   01/24/22 104 kg (230 lb)   10/02/21 101 kg (222 lb)   10/01/21 101 kg (222 lb)     Temp Readings from Last 3 Encounters:   10/02/21 97 5 °F (36 4 °C)   10/01/21 98 °F (36 7 °C) (Temporal)   09/21/21 98 °F (36 7 °C)     BP Readings from Last 3 Encounters:   01/24/22 136/68   10/02/21 124/76   10/01/21 156/88     Pulse Readings from Last 3 Encounters:   01/24/22 71   10/02/21 64 10/01/21 80         Physical Exam  HENT:      Head: Atraumatic  Mouth/Throat:      Mouth: Mucous membranes are moist    Eyes:      Extraocular Movements: Extraocular movements intact  Cardiovascular:      Rate and Rhythm: Normal rate and regular rhythm  Heart sounds: Normal heart sounds  Pulmonary:      Effort: Pulmonary effort is normal       Breath sounds: Normal breath sounds  Abdominal:      General: Abdomen is flat  Musculoskeletal:         General: Normal range of motion  Cervical back: Normal range of motion  Skin:     General: Skin is warm  Neurological:      General: No focal deficit present  Mental Status: He is alert and oriented to person, place, and time     Psychiatric:         Mood and Affect: Mood normal          Behavior: Behavior normal            Laboratory Studies:  CMP:  Lab Results   Component Value Date    K 3 5 10/01/2021     10/01/2021    CO2 34 (H) 10/01/2021    BUN 20 10/01/2021    CREATININE 1 39 (H) 10/01/2021    AST 21 10/01/2021    ALT 53 10/01/2021    EGFR 62 10/01/2021       Lipid Profile:   No results found for: CHOL  Lab Results   Component Value Date    HDL 24 (L) 2020     Lab Results   Component Value Date    LDLCALC 128 (H) 2020     Lab Results   Component Value Date    TRIG 222 (H) 2020       Cardiac testing:     Results for orders placed during the hospital encounter of 20    Echo complete with contrast if indicated    [address]    Transthoracic Echocardiogram  2D, M-mode, Doppler, and Color Doppler    Study date:  02-Sep-2020    Patient: Garrison Cook  MR number: [id number]  Account number: [de-identified]  : 1980  Age: 38 years  Gender: Male  Status: Inpatient  Location: [address] [place] [address]  Height: 70 in  Weight: 224 6 lb  BP: 159/ 118 mmHg    Indications: Chest pain    Diagnoses: R07 9 - Chest pain, unspecified    Sonographer: Bimal Rowe, [facility]  Primary Physician:  Martha Romero MD  Referring Physician:  TERRENCE Cottrell  Group:  [facility]  Interpreting Physician:  Tasneem Szymanski MD    SUMMARY    LEFT VENTRICLE:  Size was normal   Systolic function was normal  Ejection fraction was estimated to be 55 %  There was moderate hypokinesis of the basal-mid inferior wall  Wall thickness was mildly increased  Doppler parameters were consistent with abnormal left ventricular relaxation (grade 1 diastolic dysfunction)  RIGHT VENTRICLE:  The size was normal   Systolic function was normal     TRICUSPID VALVE:  There was trace regurgitation  HISTORY: PRIOR HISTORY: MI; HTN; GERD; Smoker; CKD 3; Chronic pain    PROCEDURE: The study was performed in the [facility]  This was a routine study  The transthoracic approach was used  The study included complete 2D imaging, M-mode, complete spectral Doppler, and color Doppler  Echocardiographic  views were limited due to decreased penetration and lung interference  This was a technically difficult study  LEFT VENTRICLE: Size was normal  Systolic function was normal  Ejection fraction was estimated to be 55 %  There were no regional wall motion abnormalities  There was moderate hypokinesis of the basal-mid inferior wall  Wall thickness was  mildly increased  DOPPLER: Doppler parameters were consistent with abnormal left ventricular relaxation (grade 1 diastolic dysfunction)  RIGHT VENTRICLE: The size was normal  Systolic function was normal  Wall thickness was normal     LEFT ATRIUM: Size was normal     RIGHT ATRIUM: Size was normal     MITRAL VALVE: Valve structure was normal  There was normal leaflet separation  DOPPLER: The transmitral velocity was within the normal range  There was no evidence for stenosis  There was no regurgitation  AORTIC VALVE: The valve was trileaflet  Leaflets exhibited normal thickness and normal cuspal separation   DOPPLER: Transaortic velocity was within the normal range  There was no evidence for stenosis  There was no regurgitation  TRICUSPID VALVE: The valve structure was normal  There was normal leaflet separation  DOPPLER: The transtricuspid velocity was within the normal range  There was no evidence for stenosis  There was trace regurgitation  The tricuspid jet  envelope definition was inadequate for estimation of RV systolic pressure  There are no indirect findings suggestive of moderate or severe pulmonary hypertension  PULMONIC VALVE: Leaflets exhibited normal thickness, no calcification, and normal cuspal separation  DOPPLER: The transpulmonic velocity was within the normal range  There was no regurgitation  PERICARDIUM: There was no pericardial effusion  The pericardium was normal in appearance  AORTA: The root exhibited normal size  SYSTEMIC VEINS: IVC: The inferior vena cava was not well visualized      SYSTEM MEASUREMENT TABLES    2D  %FS: 37 9 %  AV Diam: 3 06 cm  EDV(Teich): 140 42 ml  EF Biplane: 46 65 %  EF(Teich): 67 54 %  ESV(Teich): 45 58 ml  IVSd: 1 03 cm  LA Area: 17 68 cm2  LA Diam: 4 2 cm  LVEDV MOD A2C: 102 51 ml  LVEDV MOD A4C: 115 51 ml  LVEDV MOD BP: 113 43 ml  LVEF MOD A2C: 50 6 %  LVEF MOD A4C: 40 28 %  LVESV MOD A2C: 50 64 ml  LVESV MOD A4C: 68 99 ml  LVESV MOD BP: 60 52 ml  LVIDd: 5 39 cm  LVIDs: 3 34 cm  LVLd A2C: 8 42 cm  LVLd A4C: 7 61 cm  LVLs A2C: 6 95 cm  LVLs A4C: 7 35 cm  LVPWd: 1 04 cm  RA Area: 13 53 cm2  RVIDd: 3 15 cm  SV MOD A2C: 51 87 ml  SV MOD A4C: 46 52 ml  SV(Teich): 94 84 ml    CW  TR Vmax: 2 31 m/s  TR maxP 32 mmHg    MM  TAPSE: 1 62 cm    PW  E': 0 05 m/s  E/E': 11 01  MV A Ayaan: 0 67 m/s  MV Dec Irwin: 2 28 m/s2  MV DecT: 235 02 ms  MV E Ayaan: 0 54 m/s  MV E/A Ratio: 0 8  MV PHT: 68 16 ms  MVA By PHT: 3 23 cm2    [facility] Accredited Echocardiography Laboratory    Prepared and electronically signed by    Taya Horn MD  Signed 02-Sep-2020 15:18:04    No results found for this or any previous visit  Results for orders placed during the hospital encounter of 20    Cardiac catheterization    Narrative  [address]  [address] [facility], [address] [facility]  [phone]    [facility]    Invasive Cardiovascular Lab Complete Report    Patient: Jose Portillo  MR number: [id number]  Account number: [de-identified]  Study date: 2020  Gender: Male  : 1980  Height: 70 1 in  Weight: 224 4 lb  BSA: 2 2 mï¾²    Allergies: NO KNOWN ALLERGIES    Diagnostic Cardiologist:  Norina Kanner, MD  Primary Physician:  Waneta Dubin, MD    SUMMARY    CORONARY CIRCULATION:  Right PDA: There was a 90 % stenosis in the distal third of the vessel segment  This lesion is a likely culprit for the patient's recent myocardial infarction  It does not appear amenable to intervention  CARDIAC STRUCTURES:  Analysis of regional contractile function demonstrated mild diaphragmatic hypokinesis  (distal/apical)  Global left ventricular function was normal  EF calculated by contrast ventriculography was 60 %  INDICATIONS:  --  Possible CAD: myocardial infarction without ST elevation (NSTEMI)  PROCEDURES PERFORMED    --  Left heart catheterization with ventriculography  --  Left coronary angiography  --  Right coronary angiography  --  Inpatient  --  Mod Sedation Same Physician Initial 15min  --  Coronary Catheterization (w/ LHC)  --  Mod Sedation Same Physician Add 15min  PROCEDURE: The risks and alternatives of the procedures and conscious sedation were explained to the patient and informed consent was obtained  The patient was brought to the cath lab and placed on the table  The planned puncture sites  were prepped and draped in the usual sterile fashion  --  Right radial artery access  After performing an Christopher's test to verify adequate ulnar artery supply to the hand, the radial site was prepped   The puncture site was infiltrated with local anesthetic  The vessel was accessed using the  modified Seldinger technique, a wire was advanced into the vessel, and a sheath was advanced over the wire into the vessel  --  Left heart catheterization with ventriculography  A catheter was advanced over a guidewire into the ascending aorta  After recording ascending aortic pressure, the catheter was advanced across the aortic valve and left ventricular  pressure was recorded  Ventriculography was performed  The catheter was pulled back across the aortic valve and into the ascending aorta and pullback pressures were obtained  --  Left coronary artery angiography  A catheter was advanced over a guidewire into the aorta and positioned in the left coronary artery ostium under fluoroscopic guidance  Angiography was performed  --  Right coronary artery angiography  A catheter was advanced over a guidewire into the aorta and positioned in the right coronary artery ostium under fluoroscopic guidance  Angiography was performed  --  Inpatient  --  Mod Sedation Same Physician Initial 15min  --  Coronary Catheterization (w/ LHC)  --  Mod Sedation Same Physician Add 15min  PROCEDURE COMPLETION: The patient tolerated the procedure well and was discharged from the cath lab  TIMING: Test started at 11:07  Test concluded at 11:24  HEMOSTASIS: The sheath was removed  The site was compressed with a Hemoband  device  Hemostasis was obtained  MEDICATIONS GIVEN: 1% Lidocaine, 1 ml, subcutaneously, at 11:08  Heparin 1000 units/ml, 4,000 units, IV, at 11:09  Verapamil (5mg/2ml), 2 5 mg, IV, at 11:09  Nitroglycerin (200mcg/ml), 200 mcg, at 11:09  Versed (2mg/2ml), 1 mg, IV, at 11:11  Fentanyl (1OOmcg/2 ml), 50 mcg, IV, at 11:11  CONTRAST GIVEN: 85 ml Omnipaque (350mg I /ml)  36 ml Omnipaque (350mg I /ml)  RADIATION EXPOSURE: Fluoroscopy time: 2 3 min  HEMODYNAMICS: Hemodynamic assessment demonstrated normal LVEDP      VENTRICLES:   -- Analysis of regional contractile function demonstrated mild diaphragmatic hypokinesis  (distal/apical) Global left ventricular function was normal  EF calculated by contrast ventriculography was 60 %  VALVES:  AORTIC VALVE:   --  There was no aortic stenosis  MITRAL VALVE:   --  The mitral valve exhibited no regurgitation  CORONARY VESSELS:   --  The coronary circulation is right dominant  --  Left main: 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  --  LAD: The vessel was medium to large sized  Angiography showed mild atherosclerosis  --  Mid LAD: There was a tubular 40 % stenosis  --  Circumflex: The vessel was large sized  Angiography showed no evidence of disease  There were two major obtuse marginals  --  RCA: The vessel was medium sized and moderately tortuous  --  Right PDA: The vessel was small sized  There was a 90 % stenosis in the distal third of the vessel segment  This lesion is a likely culprit for the patient's recent myocardial infarction  It does not appear amenable to intervention  IMPRESSIONS:  There is significant single vessel coronary artery disease  type 1 MI    RECOMMENDATIONS  Patient management should include adding lipid lowering therapy  Patient management should include an exercise program, smoking cessation, and aggressive risk factor modification  DISPOSITION:  The patient left the catheterization laboratory in stable condition  Prepared and signed by    Michael Beckwith MD  Signed 09/03/2020 11:31:04    Study diagram    Angiographic findings  Native coronary lesions:  ï¾·Mid LAD: Lesion 1: tubular, 40 % stenosis  ï¾·RPDA: Lesion 1: 90 % stenosis      Hemodynamic tables    Pressures:  Baseline  Pressures:  - HR: 91  Pressures:  - Rhythm:  Pressures:  -- Aortic Pressure (S/D/M): 143/98/120  Pressures:  -- Left Ventricle (s/edp): 151/15/--    Outputs:  Baseline  Outputs:  -- CALCULATIONS: [age]  Outputs:  -- CALCULATIONS: Body Surface Area: 2 20  Outputs:  -- CALCULATIONS: Height in cm: 178 00  Outputs:  -- CALCULATIONS: Sex: Male  Outputs:  -- CALCULATIONS: Weight in k 00    No results found for this or any previous visit

## 2022-01-24 NOTE — PATIENT INSTRUCTIONS
Recommendations:  1  Discontinue clopidogrel  2  Continue remainder of medications  3  Check fasting lipid panel and complete metabolic profile  4  Follow up in 6 months

## 2022-03-10 ENCOUNTER — HOSPITAL ENCOUNTER (EMERGENCY)
Facility: HOSPITAL | Age: 42
Discharge: HOME | End: 2022-03-10
Attending: EMERGENCY MEDICINE
Payer: COMMERCIAL

## 2022-03-10 ENCOUNTER — APPOINTMENT (EMERGENCY)
Dept: ULTRASOUND IMAGING | Facility: HOSPITAL | Age: 42
End: 2022-03-10
Payer: COMMERCIAL

## 2022-03-10 VITALS
OXYGEN SATURATION: 100 % | TEMPERATURE: 97.3 F | SYSTOLIC BLOOD PRESSURE: 161 MMHG | RESPIRATION RATE: 18 BRPM | HEART RATE: 89 BPM | DIASTOLIC BLOOD PRESSURE: 89 MMHG

## 2022-03-10 DIAGNOSIS — N34.2 URETHRITIS: ICD-10-CM

## 2022-03-10 DIAGNOSIS — N43.3 HYDROCELE: Primary | ICD-10-CM

## 2022-03-10 DIAGNOSIS — I86.1 VARICOCELE: ICD-10-CM

## 2022-03-10 LAB
BACTERIA UR QL AUTO: NORMAL /HPF
BILIRUB UR QL STRIP: NEGATIVE
CLARITY UR: CLEAR
COLOR UR: YELLOW
GLUCOSE UR STRIP-MCNC: NEGATIVE MG/DL
HGB UR QL STRIP.AUTO: ABNORMAL
KETONES UR STRIP-MCNC: NEGATIVE MG/DL
LEUKOCYTE ESTERASE UR QL STRIP: ABNORMAL
NITRITE UR QL STRIP: NEGATIVE
NON-SQ EPI CELLS URNS QL MICRO: NORMAL /HPF
PH UR STRIP.AUTO: 6 [PH]
PROT UR STRIP-MCNC: NEGATIVE MG/DL
RBC #/AREA URNS AUTO: NORMAL /HPF
SP GR UR STRIP.AUTO: 1.01 (ref 1–1.03)
UROBILINOGEN UR QL STRIP.AUTO: 0.2 E.U./DL
WBC #/AREA URNS AUTO: NORMAL /HPF

## 2022-03-10 PROCEDURE — 99284 EMERGENCY DEPT VISIT MOD MDM: CPT

## 2022-03-10 PROCEDURE — 87591 N.GONORRHOEAE DNA AMP PROB: CPT | Performed by: PHYSICIAN ASSISTANT

## 2022-03-10 PROCEDURE — 81001 URINALYSIS AUTO W/SCOPE: CPT | Performed by: PHYSICIAN ASSISTANT

## 2022-03-10 PROCEDURE — 96372 THER/PROPH/DIAG INJ SC/IM: CPT

## 2022-03-10 PROCEDURE — 76870 US EXAM SCROTUM: CPT

## 2022-03-10 PROCEDURE — 87491 CHLMYD TRACH DNA AMP PROBE: CPT | Performed by: PHYSICIAN ASSISTANT

## 2022-03-10 PROCEDURE — 99284 EMERGENCY DEPT VISIT MOD MDM: CPT | Performed by: PHYSICIAN ASSISTANT

## 2022-03-10 RX ORDER — DOXYCYCLINE HYCLATE 100 MG/1
100 CAPSULE ORAL ONCE
Status: COMPLETED | OUTPATIENT
Start: 2022-03-10 | End: 2022-03-10

## 2022-03-10 RX ORDER — DOXYCYCLINE HYCLATE 100 MG/1
100 CAPSULE ORAL 2 TIMES DAILY
Qty: 14 CAPSULE | Refills: 0 | Status: SHIPPED | OUTPATIENT
Start: 2022-03-10 | End: 2022-03-17

## 2022-03-10 RX ADMIN — DOXYCYCLINE 100 MG: 100 CAPSULE ORAL at 11:30

## 2022-03-10 RX ADMIN — LIDOCAINE HYDROCHLORIDE 500 MG: 10 INJECTION, SOLUTION EPIDURAL; INFILTRATION; INTRACAUDAL; PERINEURAL at 11:31

## 2022-03-10 NOTE — DISCHARGE INSTRUCTIONS
Rest, increase fluids  Take doxycycline twice a day for next 7 days  Results of testing will be available in 3-4 days  Follow up with urologist if symptoms continue

## 2022-03-10 NOTE — ED PROVIDER NOTES
History  Chief Complaint   Patient presents with    Penis / Scrotum Problem     pain in scrotum     Patient is a 37 y/o M that presents to the ED with scrotal pain that started a couple days ago  He states he developed pain after having oral sex with his partner  He states when he ejaculates it is a yellowish color  He denies dysuria or urinary frequency  He has not noticed any erythema or swelling  Patient states he does have a wound on his penis from getting his penis caught in his zipper  History provided by:  Patient  Penis / Scrotum Problem  Presenting symptoms: scrotal pain    Presenting symptoms: no dysuria, no penile discharge and no penile pain    Context: during intercourse    Relieved by:  Nothing  Worsened by:  Nothing  Ineffective treatments:  None tried  Associated symptoms: groin pain    Associated symptoms: no abdominal pain, no diarrhea, no fever, no flank pain, no genital itching, no genital lesions, no genital rash, no hematuria, no nausea, no penile redness, no penile swelling, no scrotal swelling, no urinary frequency, no urinary hesitation, no urinary incontinence, no urinary retention and no vomiting    Risk factors: new sexual partner and recent sexual activity        Prior to Admission Medications   Prescriptions Last Dose Informant Patient Reported? Taking?    amLODIPine (NORVASC) 5 mg tablet  Self No No   Sig: Take 1 tablet (5 mg total) by mouth daily   aspirin (ECOTRIN LOW STRENGTH) 81 mg EC tablet  Self No No   Sig: Take 1 tablet (81 mg total) by mouth daily   chlorhexidine (PERIDEX) 0 12 % solution  Self No No   Sig: Apply 15 mL to the mouth or throat 2 (two) times a day   losartan (COZAAR) 100 MG tablet  Self No No   Sig: Take 1 tablet (100 mg total) by mouth daily   metoprolol succinate (TOPROL-XL) 25 mg 24 hr tablet  Self No No   Sig: Take 1 tablet (25 mg total) by mouth daily   nicotine (NICODERM CQ) 21 mg/24 hr TD 24 hr patch  Self No No   Sig: Place 1 patch on the skin every 24 hours   nitroglycerin (NITROSTAT) 0 4 mg SL tablet  Self No No   Sig: Place 1 tablet (0 4 mg total) under the tongue every 5 (five) minutes as needed for chest pain   pantoprazole (PROTONIX) 40 mg tablet  Self No No   Sig: Take 1 tablet (40 mg total) by mouth daily in the early morning   phenol (Chloraseptic) 1 4 % mucosal liquid  Self No No   Sig: Apply 1 spray to the mouth or throat every 2 (two) hours as needed (sore throat)   rosuvastatin (CRESTOR) 10 MG tablet  Self No No   Sig: Take 1 tablet (10 mg total) by mouth daily   sucralfate (CARAFATE) 1 g/10 mL suspension  Self No No   Sig: Take 10 mL (1 g total) by mouth 4 (four) times a day as needed (upset stomach)      Facility-Administered Medications: None       Past Medical History:   Diagnosis Date    Chronic pain     GERD (gastroesophageal reflux disease)     Hypertension        Past Surgical History:   Procedure Laterality Date    BACK SURGERY      CARDIAC CATHETERIZATION      NO PAST SURGERIES         History reviewed  No pertinent family history  I have reviewed and agree with the history as documented  E-Cigarette/Vaping    E-Cigarette Use Never User      E-Cigarette/Vaping Substances    Nicotine No     Flavoring No      Social History     Tobacco Use    Smoking status: Current Every Day Smoker     Packs/day: 1 00    Smokeless tobacco: Never Used   Vaping Use    Vaping Use: Never used   Substance Use Topics    Alcohol use: Yes     Comment: social    Drug use: Never       Review of Systems   Constitutional: Negative for chills and fever  HENT: Negative  Respiratory: Negative for cough and shortness of breath  Cardiovascular: Negative for chest pain and leg swelling  Gastrointestinal: Negative for abdominal pain, diarrhea, nausea and vomiting  Genitourinary: Positive for testicular pain   Negative for bladder incontinence, difficulty urinating, dysuria, flank pain, frequency, genital sores, hematuria, hesitancy, penile discharge, penile pain, penile swelling, scrotal swelling and urgency  Musculoskeletal: Negative for back pain and neck pain  Skin: Negative for color change and rash  Neurological: Negative for dizziness, weakness and numbness  Psychiatric/Behavioral: Negative for confusion  All other systems reviewed and are negative  Physical Exam  Physical Exam  Vitals and nursing note reviewed  Exam conducted with a chaperone present (Isac Gonzalez)  Constitutional:       General: He is not in acute distress  Appearance: Normal appearance  He is well-developed, well-groomed and normal weight  He is not ill-appearing or diaphoretic  HENT:      Head: Normocephalic and atraumatic  Right Ear: External ear normal       Left Ear: External ear normal       Nose: Nose normal    Eyes:      Conjunctiva/sclera: Conjunctivae normal    Cardiovascular:      Rate and Rhythm: Normal rate and regular rhythm  Heart sounds: Normal heart sounds  Pulmonary:      Effort: Pulmonary effort is normal       Breath sounds: Normal breath sounds  No wheezing or rhonchi  Abdominal:      General: Abdomen is flat  Bowel sounds are normal       Palpations: Abdomen is soft  Tenderness: There is no abdominal tenderness  Hernia: There is no hernia in the left inguinal area or right inguinal area  Genitourinary:     Penis: Normal        Testes:         Right: Tenderness present  Swelling not present  Left: Tenderness present  Swelling not present  Musculoskeletal:         General: Normal range of motion  Cervical back: Normal range of motion  Right lower leg: No edema  Left lower leg: No edema  Lymphadenopathy:      Lower Body: Right inguinal adenopathy present  No left inguinal adenopathy  Skin:     General: Skin is warm and dry  Coloration: Skin is not jaundiced or pale  Findings: No rash  Neurological:      General: No focal deficit present        Mental Status: He is alert and oriented to person, place, and time  Motor: No weakness  Psychiatric:         Behavior: Behavior is cooperative  Vital Signs  ED Triage Vitals [03/10/22 0905]   Temperature Pulse Respirations Blood Pressure SpO2   (!) 97 3 °F (36 3 °C) 92 19 (!) 184/109 100 %      Temp Source Heart Rate Source Patient Position - Orthostatic VS BP Location FiO2 (%)   Temporal Monitor Lying Left arm --      Pain Score       3           Vitals:    03/10/22 0905   BP: (!) 184/109   Pulse: 92   Patient Position - Orthostatic VS: Lying         Visual Acuity      ED Medications  Medications   cefTRIAXone (ROCEPHIN) 500 mg in lidocaine (PF) (XYLOCAINE-MPF) 1 % IM only syringe (has no administration in time range)   doxycycline hyclate (VIBRAMYCIN) capsule 100 mg (100 mg Oral Given 3/10/22 1130)       Diagnostic Studies  Results Reviewed     Procedure Component Value Units Date/Time    Urine Microscopic [[id number]]  (Normal) Collected: 03/10/22 1022    Lab Status: Final result Specimen: Urine, Clean Catch Updated: 03/10/22 1128     RBC, UA 0-1 /hpf      WBC, UA 1-2 /hpf      Epithelial Cells None Seen /hpf      Bacteria, UA Occasional /hpf     UA w Reflex to Microscopic w Reflex to Culture [[id number]]  (Abnormal) Collected: 03/10/22 1022    Lab Status: Final result Specimen: Urine, Clean Catch Updated: 03/10/22 1106     Color, UA Yellow     Clarity, UA Clear     Specific Gravity, UA 1 015     pH, UA 6 0     Leukocytes, UA Trace     Nitrite, UA Negative     Protein, UA Negative mg/dl      Glucose, UA Negative mg/dl      Ketones, UA Negative mg/dl      Urobilinogen, UA 0 2 E U /dl      Bilirubin, UA Negative     Blood, UA Trace-Intact    Chlamydia/GC amplified DNA by PCR [[id number]] Collected: 03/10/22 1022    Lab Status: In process Specimen: Urine, Other Updated: 03/10/22 1027                 US scrotum and testicles   Final Result by Lorena Hernandez MD (03/10 1008)       Normal sonographic appearance of the testes  Other nonemergent findings above  Workstation performed: [id number]                    Procedures  Procedures         ED Course                                             MDM  Number of Diagnoses or Management Options  Hydrocele: new and does not require workup  Urethritis: new and requires workup  Varicocele: new and does not require workup  Diagnosis management comments: Patient with groin pain, will order u/s to r/o epididymitis  Concern for STI since patient has discolored semen  Incidental finding of hydrocele and varicocele  GC/Chlamydia pending, will treat given symptoms  ADvised f/u with urologist if symptoms continue  Amount and/or Complexity of Data Reviewed  Clinical lab tests: ordered and reviewed  Tests in the radiology section of CPT®: ordered and reviewed    Patient Progress  Patient progress: stable      Disposition  Final diagnoses:   Hydrocele - bilateral   Varicocele   Urethritis     Time reflects when diagnosis was documented in both MDM as applicable and the Disposition within this note     Time User Action Codes Description Comment    3/10/2022 10:10 AM Dolph Reddish Add [N43 3] Hydrocele     3/10/2022 10:10 AM Dolph Reddish Modify [N43 3] Hydrocele bilateral    3/10/2022 10:10 AM Dolph Reddish Add [I86 1] Varicocele     3/10/2022 11:18 AM Dolph Reddish Add [N34 2] Urethritis       ED Disposition     ED Disposition Condition Date/Time Comment    Discharge Stable u Mar 10, 2022 11:18 AM Jacobo Cortez discharge to home/self care              Follow-up Information     Follow up With Specialties Details Why Contact Info Additional [address] [facility] [place] [facility]rology Schedule an appointment as soon as possible for a visit  As needed, For recheck [address] [address] Sammy Ledbetter [facility] [id number]  [address] [place] [address] [address], [address] [phone]          Patient's Medications   Discharge Prescriptions    DOXYCYCLINE HYCLATE (VIBRAMYCIN) 100 MG CAPSULE    Take 1 capsule (100 mg total) by mouth 2 (two) times a day for 7 days       Start Date: 3/10/2022 End Date: 3/17/2022       Order Dose: 100 mg       Quantity: 14 capsule    Refills: 0       No discharge procedures on file      PDMP Review       Value Time User    PDMP Reviewed  Yes 2/11/2021  1:47 PM Dayana Lai DO          ED Provider  Electronically Signed by           Yariel Rojas PA-C  03/10/22 1136

## 2022-03-15 LAB
C TRACH DNA SPEC QL NAA+PROBE: NEGATIVE
N GONORRHOEA DNA SPEC QL NAA+PROBE: NEGATIVE

## 2022-05-03 ENCOUNTER — APPOINTMENT (EMERGENCY)
Dept: RADIOLOGY | Facility: HOSPITAL | Age: 42
End: 2022-05-03
Payer: COMMERCIAL

## 2022-05-03 ENCOUNTER — HOSPITAL ENCOUNTER (EMERGENCY)
Facility: HOSPITAL | Age: 42
Discharge: HOME | End: 2022-05-03
Attending: EMERGENCY MEDICINE
Payer: COMMERCIAL

## 2022-05-03 VITALS
WEIGHT: 230 LBS | DIASTOLIC BLOOD PRESSURE: 96 MMHG | HEART RATE: 83 BPM | TEMPERATURE: 98.6 F | BODY MASS INDEX: 32.93 KG/M2 | SYSTOLIC BLOOD PRESSURE: 143 MMHG | HEIGHT: 70 IN | RESPIRATION RATE: 16 BRPM | OXYGEN SATURATION: 98 %

## 2022-05-03 DIAGNOSIS — M79.672 FOOT PAIN, LEFT: ICD-10-CM

## 2022-05-03 DIAGNOSIS — M72.2 PLANTAR FASCIITIS: Primary | ICD-10-CM

## 2022-05-03 PROCEDURE — 99284 EMERGENCY DEPT VISIT MOD MDM: CPT | Performed by: EMERGENCY MEDICINE

## 2022-05-03 PROCEDURE — 99283 EMERGENCY DEPT VISIT LOW MDM: CPT

## 2022-05-03 PROCEDURE — 73630 X-RAY EXAM OF FOOT: CPT

## 2022-05-03 NOTE — ED PROVIDER NOTES
History  Chief Complaint   Patient presents with    Foot Pain     patient presents to ED with left foot pain  per patient to ED with left foot pain for the past week  patient not sure if he stepped on anything, denies acute injury,     41-year-old male presents for evaluation of left foot pain  Patient states that he has been having pain for approximately a week  He noticed 1 small white spot on the plantar surface of his foot towards the posterior- lateral aspect of his foot  The patient does not recall stepping on anything  He he does construction and wears boots consistently  He denies any associated fevers, chills, redness to the area  Patient denies any ankle or proximal leg pain  He has been taking Tylenol without improvement  Prior to Admission Medications   Prescriptions Last Dose Informant Patient Reported? Taking?    amLODIPine (NORVASC) 5 mg tablet  Self No No   Sig: Take 1 tablet (5 mg total) by mouth daily   aspirin (ECOTRIN LOW STRENGTH) 81 mg EC tablet  Self No No   Sig: Take 1 tablet (81 mg total) by mouth daily   chlorhexidine (PERIDEX) 0 12 % solution  Self No No   Sig: Apply 15 mL to the mouth or throat 2 (two) times a day   losartan (COZAAR) 100 MG tablet  Self No No   Sig: Take 1 tablet (100 mg total) by mouth daily   metoprolol succinate (TOPROL-XL) 25 mg 24 hr tablet  Self No No   Sig: Take 1 tablet (25 mg total) by mouth daily   nicotine (NICODERM CQ) 21 mg/24 hr TD 24 hr patch  Self No No   Sig: Place 1 patch on the skin every 24 hours   nitroglycerin (NITROSTAT) 0 4 mg SL tablet  Self No No   Sig: Place 1 tablet (0 4 mg total) under the tongue every 5 (five) minutes as needed for chest pain   pantoprazole (PROTONIX) 40 mg tablet  Self No No   Sig: Take 1 tablet (40 mg total) by mouth daily in the early morning   phenol (Chloraseptic) 1 4 % mucosal liquid  Self No No   Sig: Apply 1 spray to the mouth or throat every 2 (two) hours as needed (sore throat)   rosuvastatin (CRESTOR) 10 MG tablet  Self No No   Sig: Take 1 tablet (10 mg total) by mouth daily   sucralfate (CARAFATE) 1 g/10 mL suspension  Self No No   Sig: Take 10 mL (1 g total) by mouth 4 (four) times a day as needed (upset stomach)      Facility-Administered Medications: None       Past Medical History:   Diagnosis Date    Chronic pain     GERD (gastroesophageal reflux disease)     Hypertension        Past Surgical History:   Procedure Laterality Date    BACK SURGERY      CARDIAC CATHETERIZATION      NO PAST SURGERIES         History reviewed  No pertinent family history  I have reviewed and agree with the history as documented  E-Cigarette/Vaping    E-Cigarette Use Never User      E-Cigarette/Vaping Substances    Nicotine No     Flavoring No      Social History     Tobacco Use    Smoking status: Current Every Day Smoker     Packs/day: 1 00    Smokeless tobacco: Never Used   Vaping Use    Vaping Use: Never used   Substance Use Topics    Alcohol use: Yes     Comment: social    Drug use: Never       Review of Systems   Constitutional: Negative for chills and fever  Musculoskeletal: Positive for arthralgias  Skin: Negative for rash and wound  All other systems reviewed and are negative  Physical Exam  Physical Exam  Vitals and nursing note reviewed  Constitutional:       General: He is not in acute distress  Appearance: He is well-developed  HENT:      Head: Normocephalic and atraumatic  Right Ear: External ear normal       Left Ear: External ear normal    Eyes:      General: No scleral icterus  Pulmonary:      Effort: Pulmonary effort is normal  No respiratory distress  Abdominal:      General: There is no distension  Musculoskeletal:         General: Normal range of motion  Cervical back: Normal range of motion  Feet:      Left foot:      Skin integrity: Callus ( Heel, lateral foot and metatarsal region) present  No ulcer, blister or skin breakdown     Skin: Findings: No rash  Neurological:      Mental Status: He is alert  Mental status is at baseline  Psychiatric:         Mood and Affect: Mood normal          Vital Signs  ED Triage Vitals [05/03/22 1432]   Temperature Pulse Respirations Blood Pressure SpO2   98 6 °F (37 °C) 83 16 143/96 98 %      Temp src Heart Rate Source Patient Position - Orthostatic VS BP Location FiO2 (%)   -- Monitor Sitting Right arm --      Pain Score       No Pain           Vitals:    05/03/22 1432   BP: 143/96   Pulse: 83   Patient Position - Orthostatic VS: Sitting         Visual Acuity      ED Medications  Medications - No data to display    Diagnostic Studies  Results Reviewed     None                 XR foot 3+ views LEFT   ED Interpretation by Dajuan Chowdhury DO (05/03 1454)   No acute osseous abnormality                 Procedures  Procedures         ED Course                               SBIRT 20yo+      Most Recent Value   SBIRT (23 yo +)    In order to provide better care to our patients, we are screening all of our patients for alcohol and drug use  Would it be okay to ask you these screening questions? No Filed at: 05/03/2022 1435   Initial Alcohol Screen: US AUDIT-C     1  How often do you have a drink containing alcohol? 0 Filed at: 05/03/2022 1435   2  How many drinks containing alcohol do you have on a typical day you are drinking? 0 Filed at: 05/03/2022 1435   3b  FEMALE Any Age, or MALE 65+: How often do you have 4 or more drinks on one occassion? 0 Filed at: 05/03/2022 1435   Audit-C Score 0 Filed at: 05/03/2022 1435   DIONICIO: How many times in the past year have you    Used an illegal drug or used a prescription medication for non-medical reasons? Never Filed at: 05/03/2022 1435                    MDM  Number of Diagnoses or Management Options  Foot pain, left  Plantar fasciitis  Diagnosis management comments:  The plan is to obtain x-ray to rule out occult fracture, occult foreign body, for possible arthritic changes such as heel spur  Patient concerned about possible wart  I do not see any of all think wart  I discussed over-the-counter treatments should something evolve in the future  The patient (and any family present) verbalized understanding of the discharge instructions and warnings that would necessitate return to the Emergency Department  All questions were answered prior to discharge  Amount and/or Complexity of Data Reviewed  Tests in the radiology section of CPT®: reviewed  Review and summarize past medical records: yes  Independent visualization of images, tracings, or specimens: yes        Disposition  Final diagnoses:   Plantar fasciitis   Foot pain, left     Time reflects when diagnosis was documented in both MDM as applicable and the Disposition within this note     Time User Action Codes Description Comment    5/3/2022  2:55 PM Ramona Vitale Add [M72 2] Plantar fasciitis     5/3/2022  2:55 PM Ramona Vitale Add [E12 453] Foot pain, left       ED Disposition     ED Disposition Condition Date/Time Comment    Discharge Stable Tue May 3, 2022  2:55 PM Aura Hogan discharge to home/self care              Follow-up Information     Follow up With Specialties Details Why Contact Info Additional Information    [facility] Schedule an appointment as soon as possible for a visit  For further evaluation [address] [address] [facility] [phone]  [address] [address], [address] [place] [address], [address] [place] [address] 6          Discharge Medication List as of 5/3/2022  2:57 PM      START taking these medications    Details   Diclofenac Sodium (VOLTAREN) 1 % Apply 2 g topically 4 (four) times a day, Starting Tue 5/3/2022, Normal         CONTINUE these medications which have NOT CHANGED    Details   amLODIPine (NORVASC) 5 mg tablet Take 1 tablet (5 mg total) by mouth daily, Starting Thu 9/9/2021, Normal      aspirin (ECOTRIN LOW STRENGTH) 81 mg EC tablet Take 1 tablet (81 mg total) by mouth daily, Starting Fri 9/4/2020, Normal      chlorhexidine (PERIDEX) 0 12 % solution Apply 15 mL to the mouth or throat 2 (two) times a day, Starting Tue 9/21/2021, Normal      losartan (COZAAR) 100 MG tablet Take 1 tablet (100 mg total) by mouth daily, Starting Thu 12/2/2021, Normal      metoprolol succinate (TOPROL-XL) 25 mg 24 hr tablet Take 1 tablet (25 mg total) by mouth daily, Starting Thu 9/9/2021, Normal      nicotine (NICODERM CQ) 21 mg/24 hr TD 24 hr patch Place 1 patch on the skin every 24 hours, Starting Sat 10/2/2021, Normal      nitroglycerin (NITROSTAT) 0 4 mg SL tablet Place 1 tablet (0 4 mg total) under the tongue every 5 (five) minutes as needed for chest pain, Starting Fri 9/4/2020, Normal      pantoprazole (PROTONIX) 40 mg tablet Take 1 tablet (40 mg total) by mouth daily in the early morning, Starting Fri 1/14/2022, Normal      phenol (Chloraseptic) 1 4 % mucosal liquid Apply 1 spray to the mouth or throat every 2 (two) hours as needed (sore throat), Starting Sat 10/2/2021, Normal      rosuvastatin (CRESTOR) 10 MG tablet Take 1 tablet (10 mg total) by mouth daily, Starting Thu 6/17/2021, Normal      sucralfate (CARAFATE) 1 g/10 mL suspension Take 10 mL (1 g total) by mouth 4 (four) times a day as needed (upset stomach), Starting Sat 10/2/2021, Normal             No discharge procedures on file      PDMP Review       Value Time User    PDMP Reviewed  Yes 2/11/2021  1:47 PM Ev Romero DO          ED Provider  Electronically Signed by           Teena Hall DO  05/03/22 6958

## 2022-06-07 ENCOUNTER — TELEPHONE (OUTPATIENT)
Dept: CARDIOLOGY CLINIC | Facility: CLINIC | Age: 42
End: 2022-06-07

## 2022-06-07 DIAGNOSIS — E78.2 MIXED HYPERLIPIDEMIA: ICD-10-CM

## 2022-06-07 RX ORDER — ROSUVASTATIN CALCIUM 10 MG/1
TABLET, COATED ORAL
Qty: 90 TABLET | Refills: 3 | Status: SHIPPED | OUTPATIENT
Start: 2022-06-07

## 2022-06-07 NOTE — TELEPHONE ENCOUNTER
Sent script for Crestor , advised pt to get lipid panel and CMP done per dr Quinn Doing order in January  Verbally understood

## 2022-06-24 ENCOUNTER — TELEPHONE (OUTPATIENT)
Dept: CARDIOLOGY CLINIC | Facility: CLINIC | Age: 42
End: 2022-06-24

## 2022-06-24 NOTE — TELEPHONE ENCOUNTER
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

## 2022-08-01 ENCOUNTER — HOSPITAL ENCOUNTER (EMERGENCY)
Facility: HOSPITAL | Age: 42
Discharge: HOME | End: 2022-08-01
Attending: EMERGENCY MEDICINE
Payer: COMMERCIAL

## 2022-08-01 ENCOUNTER — OFFICE VISIT (OUTPATIENT)
Dept: URGENT CARE | Facility: CLINIC | Age: 42
End: 2022-08-01
Payer: COMMERCIAL

## 2022-08-01 ENCOUNTER — APPOINTMENT (OUTPATIENT)
Dept: RADIOLOGY | Facility: CLINIC | Age: 42
End: 2022-08-01
Payer: COMMERCIAL

## 2022-08-01 VITALS
OXYGEN SATURATION: 100 % | TEMPERATURE: 98.2 F | HEART RATE: 63 BPM | HEIGHT: 70 IN | WEIGHT: 230 LBS | RESPIRATION RATE: 16 BRPM | BODY MASS INDEX: 32.93 KG/M2 | DIASTOLIC BLOOD PRESSURE: 82 MMHG | SYSTOLIC BLOOD PRESSURE: 153 MMHG

## 2022-08-01 VITALS
OXYGEN SATURATION: 98 % | RESPIRATION RATE: 18 BRPM | BODY MASS INDEX: 32.5 KG/M2 | HEART RATE: 60 BPM | TEMPERATURE: 98 F | WEIGHT: 227 LBS | DIASTOLIC BLOOD PRESSURE: 84 MMHG | SYSTOLIC BLOOD PRESSURE: 148 MMHG | HEIGHT: 70 IN

## 2022-08-01 DIAGNOSIS — M79.672 LEFT FOOT PAIN: Primary | ICD-10-CM

## 2022-08-01 DIAGNOSIS — S99.922A INJURY OF LEFT FOOT, INITIAL ENCOUNTER: ICD-10-CM

## 2022-08-01 PROCEDURE — 73630 X-RAY EXAM OF FOOT: CPT

## 2022-08-01 PROCEDURE — G0382 LEV 3 HOSP TYPE B ED VISIT: HCPCS | Performed by: PHYSICIAN ASSISTANT

## 2022-08-01 PROCEDURE — 99282 EMERGENCY DEPT VISIT SF MDM: CPT | Performed by: EMERGENCY MEDICINE

## 2022-08-01 PROCEDURE — 99283 EMERGENCY DEPT VISIT LOW MDM: CPT

## 2022-08-01 NOTE — PATIENT INSTRUCTIONS
Plantar Wart   AMBULATORY CARE:   A plantar wart  is a thick, rough skin growth on the bottom of your foot  Plantar warts are benign (not cancer) and they are caused by human papillomavirus (HPV)  HPV is a germ that spreads through direct contact  It usually enters the skin through cuts or scratches on the bottom of your feet  You may get a plantar wart if you touch someone else's wart  Common symptoms include the following:  Plantar warts most commonly form on pressure points, such as the heel or ball of your foot  You may have any of the following:  A flat, gray, brown, or flesh-colored growth    Black dots in the center of your wart    One wart or a cluster of them    A small wart that grows in size    Pain or tenderness when you walk or stand    Contact your healthcare provider if:   Your wart returns or does not go away after treatment  Your wart grows larger or begins to spread or cluster  You have bleeding or increased pain after treatment  You have questions or concerns about your condition or care  Home treatments:  Use home treatments as directed  Keep your wart and skin clean and dry between treatments  Salicylic acid  is an over-the-counter peeling agent that comes as a liquid  Soak your foot in warm water for up to 20 minutes  Apply a small amount of salicylic acid directly to your wart  Avoid getting it on other skin areas, because it may irritate healthy skin  Let it dry, and cover the wart as directed  After several hours, use a pumice stone or nail file to gently remove dead skin  Use 2 times each day for as long as directed  A plaster patch  is also available over-the-counter  Cut the patch to the size of your wart  Apply the sticky side to the wart  After 1 to 2 days, peel the patch off and apply a fresh patch  Liquid nitrogen  is used to freeze your wart  Liquid nitrogen is available over-the-counter but may also be applied at your healthcare provider's office   Liquid nitrogen may cause mild pain for a short time  Use only as directed  Duct tape  can help dry and remove the wart  Use as directed  You may be directed to leave the duct tape on for 6 days  On day 7, take the tape off and soak the wart in warm water for 5 minutes  Gently scrape the wart with a pumice stone or nail file  Then apply a new piece of duct tape and follow the same steps until the wart is gone  Prevent another plantar wart:   Do not touch your wart, or someone else's wart  If you do touch your wart, wash your hands  Do not walk barefoot in public places  Wear shower shoes or sandals in warm, damp areas such as locker rooms, shower stalls, and swimming pool areas  Keep your feet clean and dry  Use foot powder between your toes and on your feet after you wash and dry them  Change socks often to avoid damp feet  If your shoes are damp from sweat, set them in a place where they can dry out before you wear them again  Do not share or reuse items  Examples include nail files, pumice stones, socks, or towels  Clean these items with hot soapy water before you use them again  Follow up with your healthcare provider as directed: You may need several treatments over weeks to months before your wart disappears  Write down your questions so you remember to ask them during your visits  © Copyright [employer] 2022 Information is for End User's use only and may not be sold, redistributed or otherwise used for commercial purposes  All illustrations and images included in CareNotes® are the copyrighted property of A D A M , Inc  or [facility] Galindo Amezcua   The above information is an  only  It is not intended as medical advice for individual conditions or treatments  Talk to your doctor, nurse or pharmacist before following any medical regimen to see if it is safe and effective for you

## 2022-08-01 NOTE — ED PROVIDER NOTES
History  Chief Complaint   Patient presents with    Foot Pain     36year old male presents for evaluation for continued pain in the plantar aspect of his left foot as well as a white dot on the plantar aspect of his foot that has been there for over 2 months  Patient has been evaluated for this 2 months ago and diagnosed with plantar fasciatis, had a negative foot xray  States that since then his pain has been progressing, and the white dot on the bottom of his foot came back despite him trying to scrape it off  He did not take any medications for this because he 'cant take anything' presumably due to his medical history  He has not followed up with podiatry or PCP in the last 2 months but feels that there is 'something there' in his foot that we are just not finding  Explained to the patient there is not much more I can offer him in the emergency department  Offered to obtain an Ultrasound for further evaluation but patient put his socks and shoes on and left prior to my evaluation  Prior to Admission Medications   Prescriptions Last Dose Informant Patient Reported? Taking?    Diclofenac Sodium (VOLTAREN) 1 %   No No   Sig: Apply 2 g topically 4 (four) times a day   amLODIPine (NORVASC) 5 mg tablet  Self No No   Sig: Take 1 tablet (5 mg total) by mouth daily   aspirin (ECOTRIN LOW STRENGTH) 81 mg EC tablet  Self No No   Sig: Take 1 tablet (81 mg total) by mouth daily   chlorhexidine (PERIDEX) 0 12 % solution  Self No No   Sig: Apply 15 mL to the mouth or throat 2 (two) times a day   losartan (COZAAR) 100 MG tablet  Self No No   Sig: Take 1 tablet (100 mg total) by mouth daily   metoprolol succinate (TOPROL-XL) 25 mg 24 hr tablet  Self No No   Sig: Take 1 tablet (25 mg total) by mouth daily   nicotine (NICODERM CQ) 21 mg/24 hr TD 24 hr patch  Self No No   Sig: Place 1 patch on the skin every 24 hours   nitroglycerin (NITROSTAT) 0 4 mg SL tablet  Self No No   Sig: Place 1 tablet (0 4 mg total) under the tongue every 5 (five) minutes as needed for chest pain   pantoprazole (PROTONIX) 40 mg tablet  Self No No   Sig: Take 1 tablet (40 mg total) by mouth daily in the early morning   phenol (Chloraseptic) 1 4 % mucosal liquid  Self No No   Sig: Apply 1 spray to the mouth or throat every 2 (two) hours as needed (sore throat)   rosuvastatin (CRESTOR) 10 MG tablet   No No   Sig: take 1 tablet by mouth once daily   sucralfate (CARAFATE) 1 g/10 mL suspension  Self No No   Sig: Take 10 mL (1 g total) by mouth 4 (four) times a day as needed (upset stomach)      Facility-Administered Medications: None       Past Medical History:   Diagnosis Date    Chronic pain     GERD (gastroesophageal reflux disease)     Hypertension     [facility] )        Past Surgical History:   Procedure Laterality Date    CARDIAC CATHETERIZATION      NO PAST SURGERIES         History reviewed  No pertinent family history  I have reviewed and agree with the history as documented  E-Cigarette/Vaping    E-Cigarette Use Never User      E-Cigarette/Vaping Substances    Nicotine No     Flavoring No      Social History     Tobacco Use    Smoking status: Current Every Day Smoker     Packs/day: 1 00    Smokeless tobacco: Never Used   Vaping Use    Vaping Use: Never used   Substance Use Topics    Alcohol use: Yes     Comment: social    Drug use: Never       Review of Systems   Constitutional: Negative for chills and fever  HENT: Negative for rhinorrhea and sore throat  Respiratory: Negative for cough and shortness of breath  Cardiovascular: Negative for chest pain and palpitations  Gastrointestinal: Negative for abdominal pain, nausea and vomiting  Genitourinary: Negative for dysuria, frequency and urgency  Musculoskeletal:        Foot pain       Physical Exam  Physical Exam  Constitutional:       Appearance: Normal appearance     HENT:      Nose: Nose normal    Eyes:      Extraocular Movements: Extraocular movements intact  Pulmonary:      Effort: Pulmonary effort is normal    Musculoskeletal:      Right lower leg: No edema  Left lower leg: No edema  Comments: Small white raised area to the lateral aspect of the plantar surface of the left foot    Neurological:      General: No focal deficit present  Mental Status: He is alert  Mental status is at baseline  Vital Signs  ED Triage Vitals [08/01/22 0509]   Temperature Pulse Respirations Blood Pressure SpO2   98 2 °F (36 8 °C) 63 16 153/82 100 %      Temp Source Heart Rate Source Patient Position - Orthostatic VS BP Location FiO2 (%)   Oral Monitor Sitting Left arm --      Pain Score       --           Vitals:    08/01/22 0509   BP: 153/82   Pulse: 63   Patient Position - Orthostatic VS: Sitting         Visual Acuity      ED Medications  Medications - No data to display    Diagnostic Studies  Results Reviewed     None                 No orders to display              Procedures  Procedures         ED Course                               SBIRT 22yo+    Flowsheet Row Most Recent Value   SBIRT (25 yo +)    In order to provide better care to our patients, we are screening all of our patients for alcohol and drug use  Would it be okay to ask you these screening questions? Yes Filed at: 08/01/2022 7819   Initial Alcohol Screen: US AUDIT-C     1  How often do you have a drink containing alcohol? 0 Filed at: 08/01/2022 0512   2  How many drinks containing alcohol do you have on a typical day you are drinking? 0 Filed at: 08/01/2022 0512   3a  Male UNDER 65: How often do you have five or more drinks on one occasion? 0 Filed at: 08/01/2022 0512   3b  FEMALE Any Age, or MALE 65+: How often do you have 4 or more drinks on one occassion? 0 Filed at: 08/01/2022 2199   Audit-C Score 0 Filed at: 08/01/2022 3382   DIONICIO: How many times in the past year have you    Used an illegal drug or used a prescription medication for non-medical reasons?  Never Filed at: 08/01/2022 0512                    [facility]  Number of Diagnoses or Management Options  Left foot pain  Diagnosis management comments: 36year old male with right foot pain, likely hyperkeratosis  w/ plantar fasciitis , patient left prior to full evaluation   Did receive counseling during prior exam and offered Podiatry followup during todays exam  He left prior to completing the evaluation      Disposition  Final diagnoses:   Left foot pain     Time reflects when diagnosis was documented in both MDM as applicable and the Disposition within this note     Time User Action Codes Description Comment    8/1/2022  5:19 AM Thelma Zeynepjh Add [H27 022] Left foot pain       ED Disposition     ED Disposition   Left from Room after Provider Exam    Condition   --    Date/Time   Mon Aug 1, 2022  5:19 AM    Comment   --         Follow-up Information    None         Discharge Medication List as of 8/1/2022  5:21 AM      CONTINUE these medications which have NOT CHANGED    Details   amLODIPine (NORVASC) 5 mg tablet Take 1 tablet (5 mg total) by mouth daily, Starting Thu 9/9/2021, Normal      aspirin (ECOTRIN LOW STRENGTH) 81 mg EC tablet Take 1 tablet (81 mg total) by mouth daily, Starting Fri 9/4/2020, Normal      chlorhexidine (PERIDEX) 0 12 % solution Apply 15 mL to the mouth or throat 2 (two) times a day, Starting Tue 9/21/2021, Normal      Diclofenac Sodium (VOLTAREN) 1 % Apply 2 g topically 4 (four) times a day, Starting Tue 5/3/2022, Normal      losartan (COZAAR) 100 MG tablet Take 1 tablet (100 mg total) by mouth daily, Starting Thu 12/2/2021, Normal      metoprolol succinate (TOPROL-XL) 25 mg 24 hr tablet Take 1 tablet (25 mg total) by mouth daily, Starting Thu 9/9/2021, Normal      nicotine (NICODERM CQ) 21 mg/24 hr TD 24 hr patch Place 1 patch on the skin every 24 hours, Starting Sat 10/2/2021, Normal      nitroglycerin (NITROSTAT) 0 4 mg SL tablet Place 1 tablet (0 4 mg total) under the tongue every 5 (five) minutes as needed for chest pain, Starting Fri 9/4/2020, Normal      pantoprazole (PROTONIX) 40 mg tablet Take 1 tablet (40 mg total) by mouth daily in the early morning, Starting Fri 1/14/2022, Normal      phenol (Chloraseptic) 1 4 % mucosal liquid Apply 1 spray to the mouth or throat every 2 (two) hours as needed (sore throat), Starting Sat 10/2/2021, Normal      rosuvastatin (CRESTOR) 10 MG tablet take 1 tablet by mouth once daily, Normal      sucralfate (CARAFATE) 1 g/10 mL suspension Take 10 mL (1 g total) by mouth 4 (four) times a day as needed (upset stomach), Starting Sat 10/2/2021, Normal             No discharge procedures on file      PDMP Review       Value Time User    PDMP Reviewed  Yes 2/11/2021  1:47 PM Fernando Gaucher, DO          ED Provider  Electronically Signed by           Shay Ray DO  08/01/22 2322

## 2022-08-01 NOTE — PROGRESS NOTES
[facility]        NAME: Skye Tripathi is a 36 y o  male  : 1980    MRN: [id number]  DATE: 2022  TIME: 3:09 PM    Assessment and Plan   Injury of left foot, initial encounter [N83 112A]  1  Injury of left foot, initial encounter  XR foot 3+ vw left         Patient Instructions       Follow up with PCP in 3-5 days  Proceed to  ER if symptoms worsen  Chief Complaint     Chief Complaint   Patient presents with    Foot Pain     Pt reports possible foreign body in left foot that began two weeks ago            History of Present Illness       HPI    Review of Systems   Review of Systems      Current Medications       Current Outpatient Medications:     amLODIPine (NORVASC) 5 mg tablet, Take 1 tablet (5 mg total) by mouth daily, Disp: 90 tablet, Rfl: 3    aspirin (ECOTRIN LOW STRENGTH) 81 mg EC tablet, Take 1 tablet (81 mg total) by mouth daily, Disp: 30 tablet, Rfl: 0    losartan (COZAAR) 100 MG tablet, Take 1 tablet (100 mg total) by mouth daily, Disp: 90 tablet, Rfl: 3    metoprolol succinate (TOPROL-XL) 25 mg 24 hr tablet, Take 1 tablet (25 mg total) by mouth daily, Disp: 90 tablet, Rfl: 3    pantoprazole (PROTONIX) 40 mg tablet, Take 1 tablet (40 mg total) by mouth daily in the early morning, Disp: 90 tablet, Rfl: 3    rosuvastatin (CRESTOR) 10 MG tablet, take 1 tablet by mouth once daily, Disp: 90 tablet, Rfl: 3    chlorhexidine (PERIDEX) 0 12 % solution, Apply 15 mL to the mouth or throat 2 (two) times a day (Patient not taking: Reported on 2022), Disp: 120 mL, Rfl: 0    Diclofenac Sodium (VOLTAREN) 1 %, Apply 2 g topically 4 (four) times a day (Patient not taking: Reported on 2022), Disp: 50 g, Rfl: 0    nicotine (NICODERM CQ) 21 mg/24 hr TD 24 hr patch, Place 1 patch on the skin every 24 hours (Patient not taking: Reported on 2022), Disp: 28 patch, Rfl: 0    nitroglycerin (NITROSTAT) 0 4 mg SL tablet, Place 1 tablet (0 4 mg total) under the tongue every 5 (five) minutes as needed for chest pain (Patient not taking: Reported on 8/1/2022), Disp: 20 tablet, Rfl: 0    phenol (Chloraseptic) 1 4 % mucosal liquid, Apply 1 spray to the mouth or throat every 2 (two) hours as needed (sore throat) (Patient not taking: Reported on 8/1/2022), Disp: 177 mL, Rfl: 0    sucralfate (CARAFATE) 1 g/10 mL suspension, Take 10 mL (1 g total) by mouth 4 (four) times a day as needed (upset stomach) (Patient not taking: Reported on 8/1/2022), Disp: 420 mL, Rfl: 0    Current Allergies     Allergies as of 08/01/2022    (No Known Allergies)            The following portions of the patient's history were reviewed and updated as appropriate: allergies, current medications, past family history, past medical history, past social history, past surgical history and problem list      Past Medical History:   Diagnosis Date    Chronic pain     GERD (gastroesophageal reflux disease)     Hypertension     [facility]my[facility] infarction) ([facility] )        Past Surgical History:   Procedure Laterality Date    CARDIAC CATHETERIZATION      NO PAST SURGERIES         No family history on file  Medications have been verified  Objective   /84   Pulse 60   Temp 98 °F (36 7 °C)   Resp 18   Ht 5' 10" (1 778 m)   Wt 103 kg (227 lb)   SpO2 98%   BMI 32 57 kg/m²   No LMP for male patient         Physical Exam     Physical Exam

## 2022-08-01 NOTE — ED TRIAGE NOTES
Pt arrives ambulatory to the ED from home stating "I think I have something in my foot"  Pt reports a "white spot" on the bottom of his L  foot has been there for "weeks"

## 2022-08-01 NOTE — LETTER
August 1, 2022     Patient: Sumi Zuniga   YOB: 1980   Date of Visit: 8/1/2022       To Whom it May Concern:    Sumi Zuniga was seen in my clinic on 8/1/2022  He may return to work on 08/02/2022  If you have any questions or concerns, please don't hesitate to call           Sincerely,          Leonlea Owens PA-C

## 2022-08-03 ENCOUNTER — OFFICE VISIT (OUTPATIENT)
Dept: PODIATRY | Facility: CLINIC | Age: 42
End: 2022-08-03
Payer: COMMERCIAL

## 2022-08-03 VITALS
SYSTOLIC BLOOD PRESSURE: 132 MMHG | HEIGHT: 70 IN | BODY MASS INDEX: 32.93 KG/M2 | HEART RATE: 66 BPM | WEIGHT: 230 LBS | DIASTOLIC BLOOD PRESSURE: 85 MMHG

## 2022-08-03 DIAGNOSIS — L85.2 PUNCTATE KERATODERMA: Primary | ICD-10-CM

## 2022-08-03 DIAGNOSIS — M79.672 LEFT FOOT PAIN: ICD-10-CM

## 2022-08-03 PROCEDURE — 99242 OFF/OP CONSLTJ NEW/EST SF 20: CPT | Performed by: PODIATRIST

## 2022-08-03 NOTE — LETTER
August 3, 2022     Richard Navarro, [address] [facility] [address]    Patient: Berny Shultz   YOB: 1980   Date of Visit: 8/3/2022       Dear Dr Celeste Miller:    Thank you for referring Berny Shultz to me for evaluation  Below are my notes for this consultation  If you have questions, please do not hesitate to call me  I look forward to following your patient along with you  Sincerely,        Jeny Melvin DPM        CC: MD Jeny Soria DPM  8/3/2022  6:20 PM  Sign when Signing Visit                 PATIENT:  Berny Shultz  1980       ASSESSMENT:     1  Punctate keratoderma     2  Left foot pain  Ambulatory Referral to Podiatry             PLAN:  1  Reviewed medical records  Patient was counseled and educated on the condition and the diagnosis  2  His skin condition is most likely a punctate keratosis  The diagnosis, treatment options and prognosis were discussed with the patient  3  Discussed options and the patient wished to proceed with chemical cauterization  Verbal consent was obtained from the patient  All hyperkeratotic skin lesions were debrided using a sterile #15 scapel  The lesions were then cauterized with Cantharidin  An occlusive dressing was applied to the areas  Instructed to remove the dressing in the morning  Instruction was given for possible local care  The patient tolerated the procedure well and without complications  4  Patient will return in 2 weeks for re-evaluation  Imaging: I have personally reviewed pertinent films in PACS  Labs, pathology, and Other Studies: I have personally reviewed pertinent reports  Subjective:       HPI  The patient was referred to my office for evaluation of left foot pain  He has sharp pain in left plantar foot for about 2 months  He thought it was a wart  He felt he was stepping on a small piece of glass  No bleeding or redness  He had X-ray and it showed no foreign body    He tried freeze-off at home without resolving the condition  The following portions of the patient's history were reviewed and updated as appropriate: allergies, current medications, past family history, past medical history, past social history, past surgical history and problem list   All pertinent labs and images were reviewed  Past Medical History  Past Medical History:   Diagnosis Date    Chronic pain     GERD (gastroesophageal reflux disease)     Hypertension     MI (myocardial infarction) ([address] )        Past Surgical History  Past Surgical History:   Procedure Laterality Date    CARDIAC CATHETERIZATION      NO PAST SURGERIES          Allergies:  Patient has no known allergies      Medications:  Current Outpatient Medications   Medication Sig Dispense Refill    amLODIPine (NORVASC) 5 mg tablet Take 1 tablet (5 mg total) by mouth daily 90 tablet 3    aspirin (ECOTRIN LOW STRENGTH) 81 mg EC tablet Take 1 tablet (81 mg total) by mouth daily 30 tablet 0    losartan (COZAAR) 100 MG tablet Take 1 tablet (100 mg total) by mouth daily 90 tablet 3    metoprolol succinate (TOPROL-XL) 25 mg 24 hr tablet Take 1 tablet (25 mg total) by mouth daily 90 tablet 3    pantoprazole (PROTONIX) 40 mg tablet Take 1 tablet (40 mg total) by mouth daily in the early morning 90 tablet 3    rosuvastatin (CRESTOR) 10 MG tablet take 1 tablet by mouth once daily 90 tablet 3    chlorhexidine (PERIDEX) 0 12 % solution Apply 15 mL to the mouth or throat 2 (two) times a day (Patient not taking: No sig reported) 120 mL 0    Diclofenac Sodium (VOLTAREN) 1 % Apply 2 g topically 4 (four) times a day (Patient not taking: No sig reported) 50 g 0    nicotine (NICODERM CQ) 21 mg/24 hr TD 24 hr patch Place 1 patch on the skin every 24 hours (Patient not taking: No sig reported) 28 patch 0    nitroglycerin (NITROSTAT) 0 4 mg SL tablet Place 1 tablet (0 4 mg total) under the tongue every 5 (five) minutes as needed for chest pain (Patient not taking: No sig reported) 20 tablet 0    phenol (Chloraseptic) 1 4 % mucosal liquid Apply 1 spray to the mouth or throat every 2 (two) hours as needed (sore throat) (Patient not taking: No sig reported) 177 mL 0    sucralfate (CARAFATE) 1 g/10 mL suspension Take 10 mL (1 g total) by mouth 4 (four) times a day as needed (upset stomach) (Patient not taking: No sig reported) 420 mL 0     No current facility-administered medications for this visit  Social History:  Social History     Socioeconomic History    Marital status: Single     Spouse name: None    Number of children: None    Years of education: None    Highest education level: None   Occupational History    None   Tobacco Use    Smoking status: Current Every Day Smoker     Packs/day: 1 00    Smokeless tobacco: Never Used   Vaping Use    Vaping Use: Never used   Substance and Sexual Activity    Alcohol use: Yes     Comment: social    Drug use: Never    Sexual activity: None   Other Topics Concern    None   Social History Narrative    None     Social Determinants of Health     Financial Resource Strain: Not on file   Food Insecurity: Not on file   Transportation Needs: Not on file   Physical Activity: Not on file   Stress: Not on file   Social Connections: Not on file   Intimate Partner Violence: Not on file   Housing Stability: Not on file          Review of Systems   Constitutional: Negative for appetite change, chills and fever  Respiratory: Negative  Cardiovascular: Negative  Gastrointestinal: Negative  Musculoskeletal: Negative for joint swelling  Skin: Negative for wound  Neurological: Negative for weakness and numbness  Hematological: Negative  Psychiatric/Behavioral: Negative for behavioral problems and confusion  Objective:      /85   Pulse 66   Ht 5' 10" (1 778 m) Comment: verbal  Wt 104 kg (230 lb)   BMI 33 00 kg/m²          Physical Exam  Vitals reviewed     Constitutional: General: He is not in acute distress  Appearance: He is not ill-appearing or toxic-appearing  HENT:      Head: Normocephalic and atraumatic  Eyes:      Extraocular Movements: Extraocular movements intact  Cardiovascular:      Rate and Rhythm: Normal rate and regular rhythm  Pulses: Normal pulses  Dorsalis pedis pulses are 2+ on the right side and 2+ on the left side  Posterior tibial pulses are 2+ on the right side and 2+ on the left side  Pulmonary:      Effort: Pulmonary effort is normal  No respiratory distress  Musculoskeletal:         General: No swelling, tenderness or signs of injury  Normal range of motion  Cervical back: Normal range of motion and neck supple  Right lower leg: No edema  Left lower leg: No edema  Right foot: No foot drop  Left foot: No foot drop  Skin:     General: Skin is warm  Capillary Refill: Capillary refill takes less than 2 seconds  Coloration: Skin is not cyanotic or mottled  Findings: No abscess, ecchymosis, erythema or wound  Nails: There is no clubbing  Comments: Small pin point keratosis under the cuboid area with surrounding callus  No clinical evidence of wart or foreign body  Neurological:      General: No focal deficit present  Mental Status: He is alert and oriented to person, place, and time  Cranial Nerves: No cranial nerve deficit  Sensory: No sensory deficit  Motor: No weakness  Coordination: Coordination normal    Psychiatric:         Mood and Affect: Mood normal          Behavior: Behavior normal          Thought Content:  Thought content normal          Judgment: Judgment normal

## 2022-08-03 NOTE — PROGRESS NOTES
PATIENT:  Akua Mendoza  1980       ASSESSMENT:     1  Punctate keratoderma     2  Left foot pain  Ambulatory Referral to Podiatry             PLAN:  1  Reviewed medical records  Patient was counseled and educated on the condition and the diagnosis  2  His skin condition is most likely a punctate keratosis  The diagnosis, treatment options and prognosis were discussed with the patient  3  Discussed options and the patient wished to proceed with chemical cauterization  Verbal consent was obtained from the patient  All hyperkeratotic skin lesions were debrided using a sterile #15 scapel  The lesions were then cauterized with Cantharidin  An occlusive dressing was applied to the areas  Instructed to remove the dressing in the morning  Instruction was given for possible local care  The patient tolerated the procedure well and without complications  4  Patient will return in 2 weeks for re-evaluation  Imaging: I have personally reviewed pertinent films in PACS  Labs, pathology, and Other Studies: I have personally reviewed pertinent reports  Subjective:       HPI  The patient was referred to my office for evaluation of left foot pain  He has sharp pain in left plantar foot for about 2 months  He thought it was a wart  He felt he was stepping on a small piece of glass  No bleeding or redness  He had X-ray and it showed no foreign body  He tried freeze-off at home without resolving the condition  The following portions of the patient's history were reviewed and updated as appropriate: allergies, current medications, past family history, past medical history, past social history, past surgical history and problem list   All pertinent labs and images were reviewed        Past Medical History  Past Medical History:   Diagnosis Date    Chronic pain     GERD (gastroesophageal reflux disease)     Hypertension     MI (myocardial infarction[facility])        Past Surgical History  Past Surgical History:   Procedure Laterality Date    CARDIAC CATHETERIZATION      NO PAST SURGERIES          Allergies:  Patient has no known allergies  Medications:  Current Outpatient Medications   Medication Sig Dispense Refill    amLODIPine (NORVASC) 5 mg tablet Take 1 tablet (5 mg total) by mouth daily 90 tablet 3    aspirin (ECOTRIN LOW STRENGTH) 81 mg EC tablet Take 1 tablet (81 mg total) by mouth daily 30 tablet 0    losartan (COZAAR) 100 MG tablet Take 1 tablet (100 mg total) by mouth daily 90 tablet 3    metoprolol succinate (TOPROL-XL) 25 mg 24 hr tablet Take 1 tablet (25 mg total) by mouth daily 90 tablet 3    pantoprazole (PROTONIX) 40 mg tablet Take 1 tablet (40 mg total) by mouth daily in the early morning 90 tablet 3    rosuvastatin (CRESTOR) 10 MG tablet take 1 tablet by mouth once daily 90 tablet 3    chlorhexidine (PERIDEX) 0 12 % solution Apply 15 mL to the mouth or throat 2 (two) times a day (Patient not taking: No sig reported) 120 mL 0    Diclofenac Sodium (VOLTAREN) 1 % Apply 2 g topically 4 (four) times a day (Patient not taking: No sig reported) 50 g 0    nicotine (NICODERM CQ) 21 mg/24 hr TD 24 hr patch Place 1 patch on the skin every 24 hours (Patient not taking: No sig reported) 28 patch 0    nitroglycerin (NITROSTAT) 0 4 mg SL tablet Place 1 tablet (0 4 mg total) under the tongue every 5 (five) minutes as needed for chest pain (Patient not taking: No sig reported) 20 tablet 0    phenol (Chloraseptic) 1 4 % mucosal liquid Apply 1 spray to the mouth or throat every 2 (two) hours as needed (sore throat) (Patient not taking: No sig reported) 177 mL 0    sucralfate (CARAFATE) 1 g/10 mL suspension Take 10 mL (1 g total) by mouth 4 (four) times a day as needed (upset stomach) (Patient not taking: No sig reported) 420 mL 0     No current facility-administered medications for this visit         Social History:  Social History     Socioeconomic History    Marital status: Single     Spouse name: None    Number of children: None    Years of education: None    Highest education level: None   Occupational History    None   Tobacco Use    Smoking status: Current Every Day Smoker     Packs/day: 1 00    Smokeless tobacco: Never Used   Vaping Use    Vaping Use: Never used   Substance and Sexual Activity    Alcohol use: Yes     Comment: social    Drug use: Never    Sexual activity: None   Other Topics Concern    None   Social History Narrative    None     Social Determinants of Health     Financial Resource Strain: Not on file   Food Insecurity: Not on file   Transportation Needs: Not on file   Physical Activity: Not on file   Stress: Not on file   Social Connections: Not on file   Intimate Partner Violence: Not on file   Housing Stability: Not on file          Review of Systems   Constitutional: Negative for appetite change, chills and fever  Respiratory: Negative  Cardiovascular: Negative  Gastrointestinal: Negative  Musculoskeletal: Negative for joint swelling  Skin: Negative for wound  Neurological: Negative for weakness and numbness  Hematological: Negative  Psychiatric/Behavioral: Negative for behavioral problems and confusion  Objective:      /85   Pulse 66   Ht 5' 10" (1 778 m) Comment: verbal  Wt 104 kg (230 lb)   BMI 33 00 kg/m²          Physical Exam  Vitals reviewed  Constitutional:       General: He is not in acute distress  Appearance: He is not ill-appearing or toxic-appearing  HENT:      Head: Normocephalic and atraumatic  Eyes:      Extraocular Movements: Extraocular movements intact  Cardiovascular:      Rate and Rhythm: Normal rate and regular rhythm  Pulses: Normal pulses  Dorsalis pedis pulses are 2+ on the right side and 2+ on the left side  Posterior tibial pulses are 2+ on the right side and 2+ on the left side     Pulmonary:      Effort: Pulmonary effort is normal  No respiratory distress  Musculoskeletal:         General: No swelling, tenderness or signs of injury  Normal range of motion  Cervical back: Normal range of motion and neck supple  Right lower leg: No edema  Left lower leg: No edema  Right foot: No foot drop  Left foot: No foot drop  Skin:     General: Skin is warm  Capillary Refill: Capillary refill takes less than 2 seconds  Coloration: Skin is not cyanotic or mottled  Findings: No abscess, ecchymosis, erythema or wound  Nails: There is no clubbing  Comments: Small pin point keratosis under the cuboid area with surrounding callus  No clinical evidence of wart or foreign body  Neurological:      General: No focal deficit present  Mental Status: He is alert and oriented to person, place, and time  Cranial Nerves: No cranial nerve deficit  Sensory: No sensory deficit  Motor: No weakness  Coordination: Coordination normal    Psychiatric:         Mood and Affect: Mood normal          Behavior: Behavior normal          Thought Content:  Thought content normal          Judgment: Judgment normal

## 2022-08-15 DIAGNOSIS — I21.4 TYPE 1 NON-ST ELEVATION MYOCARDIAL INFARCTION (NSTEMI) (HCC): ICD-10-CM

## 2022-08-15 DIAGNOSIS — I16.0 HYPERTENSIVE URGENCY: ICD-10-CM

## 2022-08-15 RX ORDER — AMLODIPINE BESYLATE 5 MG/1
TABLET ORAL
Qty: 90 TABLET | Refills: 3 | Status: SHIPPED | OUTPATIENT
Start: 2022-08-15

## 2022-08-17 ENCOUNTER — OFFICE VISIT (OUTPATIENT)
Dept: PODIATRY | Facility: CLINIC | Age: 42
End: 2022-08-17
Payer: COMMERCIAL

## 2022-08-17 VITALS
HEART RATE: 54 BPM | HEIGHT: 70 IN | SYSTOLIC BLOOD PRESSURE: 138 MMHG | BODY MASS INDEX: 32.93 KG/M2 | DIASTOLIC BLOOD PRESSURE: 90 MMHG | WEIGHT: 230 LBS

## 2022-08-17 DIAGNOSIS — L85.2 PUNCTATE KERATODERMA: Primary | ICD-10-CM

## 2022-08-17 PROCEDURE — 99212 OFFICE O/P EST SF 10 MIN: CPT | Performed by: PODIATRIST

## 2022-08-17 NOTE — PROGRESS NOTES
PATIENT:  Moose Saldana  1980       ASSESSMENT:     1  Punctate keratoderma               PLAN:  1  Reviewed medical records  Patient was counseled and educated on the condition and the diagnosis  2  Healed blister removed and the lesion looks resolved  Instructed proper skin care and monitor for any recurrence  He may return as needed  Imaging: I have personally reviewed pertinent films in PACS  Labs, pathology, and Other Studies: I have personally reviewed pertinent reports  Subjective:       HPI  The patient presents for left foot evaluation  Pain is resolved  No redness or drainage  The following portions of the patient's history were reviewed and updated as appropriate: allergies, current medications, past family history, past medical history, past social history, past surgical history and problem list   All pertinent labs and images were reviewed  Past Medical History  Past Medical History:   Diagnosis Date    Chronic pain     GERD (gastroesophageal reflux disease)     Hypertension     [facility]        Past Surgical History  Past Surgical History:   Procedure Laterality Date    CARDIAC CATHETERIZATION      NO PAST SURGERIES          Allergies:  Patient has no known allergies      Medications:  Current Outpatient Medications   Medication Sig Dispense Refill    amLODIPine (NORVASC) 5 mg tablet take 1 tablet by mouth once daily 90 tablet 3    aspirin (ECOTRIN LOW STRENGTH) 81 mg EC tablet Take 1 tablet (81 mg total) by mouth daily 30 tablet 0    losartan (COZAAR) 100 MG tablet Take 1 tablet (100 mg total) by mouth daily 90 tablet 3    metoprolol succinate (TOPROL-XL) 25 mg 24 hr tablet Take 1 tablet (25 mg total) by mouth daily 90 tablet 3    pantoprazole (PROTONIX) 40 mg tablet Take 1 tablet (40 mg total) by mouth daily in the early morning 90 tablet 3    rosuvastatin (CRESTOR) 10 MG tablet take 1 tablet by mouth once daily 90 tablet 3    chlorhexidine (PERIDEX) 0 12 % solution Apply 15 mL to the mouth or throat 2 (two) times a day (Patient not taking: No sig reported) 120 mL 0    Diclofenac Sodium (VOLTAREN) 1 % Apply 2 g topically 4 (four) times a day (Patient not taking: No sig reported) 50 g 0    nicotine (NICODERM CQ) 21 mg/24 hr TD 24 hr patch Place 1 patch on the skin every 24 hours (Patient not taking: No sig reported) 28 patch 0    nitroglycerin (NITROSTAT) 0 4 mg SL tablet Place 1 tablet (0 4 mg total) under the tongue every 5 (five) minutes as needed for chest pain (Patient not taking: No sig reported) 20 tablet 0    phenol (Chloraseptic) 1 4 % mucosal liquid Apply 1 spray to the mouth or throat every 2 (two) hours as needed (sore throat) (Patient not taking: No sig reported) 177 mL 0    sucralfate (CARAFATE) 1 g/10 mL suspension Take 10 mL (1 g total) by mouth 4 (four) times a day as needed (upset stomach) (Patient not taking: No sig reported) 420 mL 0     No current facility-administered medications for this visit         Social History:  Social History     Socioeconomic History    Marital status: Single     Spouse name: None    Number of children: None    Years of education: None    Highest education level: None   Occupational History    None   Tobacco Use    Smoking status: Current Every Day Smoker     Packs/day: 1 00    Smokeless tobacco: Never Used   Vaping Use    Vaping Use: Never used   Substance and Sexual Activity    Alcohol use: Yes     Comment: social    Drug use: Never    Sexual activity: None   Other Topics Concern    None   Social History Narrative    None     Social Determinants of Health     Financial Resource Strain: Not on file   Food Insecurity: Not on file   Transportation Needs: Not on file   Physical Activity: Not on file   Stress: Not on file   Social Connections: Not on file   Intimate Partner Violence: Not on file   Housing Stability: Not on file          Review of Systems   Constitutional: Negative for appetite change, chills and fever  Respiratory: Negative  Cardiovascular: Negative  Gastrointestinal: Negative  Musculoskeletal: Negative for joint swelling  Objective:      /90 (BP Location: Right arm, Patient Position: Sitting, Cuff Size: Adult)   Pulse (!) 54   Ht 5' 10" (1 778 m)   Wt 104 kg (230 lb)   BMI 33 00 kg/m²          Physical Exam  Vitals reviewed  Constitutional:       General: He is not in acute distress  Appearance: He is not ill-appearing or toxic-appearing  Cardiovascular:      Rate and Rhythm: Normal rate and regular rhythm  Pulses: Normal pulses  Dorsalis pedis pulses are 2+ on the right side and 2+ on the left side  Posterior tibial pulses are 2+ on the right side and 2+ on the left side  Pulmonary:      Effort: Pulmonary effort is normal  No respiratory distress  Musculoskeletal:         General: No swelling, tenderness or signs of injury  Normal range of motion  Right lower leg: No edema  Left lower leg: No edema  Right foot: No foot drop  Left foot: No foot drop  Skin:     General: Skin is warm  Capillary Refill: Capillary refill takes less than 2 seconds  Coloration: Skin is not cyanotic or mottled  Findings: No abscess, ecchymosis, erythema or wound  Nails: There is no clubbing  Comments: Healed blister under the cuboid area  No keratosis  Skin lesion looks resolved after removing the epidermal layer  No pain on direct pressure or lateral compression  Neurological:      General: No focal deficit present  Mental Status: He is alert and oriented to person, place, and time  Cranial Nerves: No cranial nerve deficit  Motor: No weakness        Coordination: Coordination normal

## 2022-08-29 DIAGNOSIS — I10 ESSENTIAL HYPERTENSION: ICD-10-CM

## 2022-08-29 DIAGNOSIS — I25.10 CORONARY ARTERY DISEASE INVOLVING NATIVE CORONARY ARTERY OF NATIVE HEART WITHOUT ANGINA PECTORIS: ICD-10-CM

## 2022-08-29 RX ORDER — METOPROLOL SUCCINATE 25 MG/1
TABLET, EXTENDED RELEASE ORAL
Qty: 90 TABLET | Refills: 3 | Status: SHIPPED | OUTPATIENT
Start: 2022-08-29

## 2022-08-30 ENCOUNTER — OFFICE VISIT (OUTPATIENT)
Dept: OBGYN CLINIC | Facility: HOSPITAL | Age: 42
End: 2022-08-30
Payer: COMMERCIAL

## 2022-08-30 VITALS
BODY MASS INDEX: 34.93 KG/M2 | HEART RATE: 68 BPM | OXYGEN SATURATION: 99 % | DIASTOLIC BLOOD PRESSURE: 80 MMHG | HEIGHT: 70 IN | WEIGHT: 244 LBS | SYSTOLIC BLOOD PRESSURE: 110 MMHG

## 2022-08-30 DIAGNOSIS — S46.012A TRAUMATIC COMPLETE TEAR OF LEFT ROTATOR CUFF, INITIAL ENCOUNTER: Primary | ICD-10-CM

## 2022-08-30 PROBLEM — M75.102 UNSPECIFIED ROTATOR CUFF TEAR OR RUPTURE OF LEFT SHOULDER, NOT SPECIFIED AS TRAUMATIC: Status: ACTIVE | Noted: 2022-08-30

## 2022-08-30 PROCEDURE — 99203 OFFICE O/P NEW LOW 30 MIN: CPT | Performed by: ORTHOPAEDIC SURGERY

## 2022-08-30 NOTE — ASSESSMENT & PLAN NOTE
We are going to keep him out of work  We are going to get a stat MRI and shoulder to better understand what is going on with his rotator cuff  We will see him back  If he is still having pain in his wrist at that time will get wrist films

## 2022-08-30 NOTE — PROGRESS NOTES
Assessment/Plan:    Unspecified rotator cuff tear or rupture of left shoulder, not specified as traumatic  We are going to keep him out of work  We are going to get a stat MRI and shoulder to better understand what is going on with his rotator cuff  We will see him back  If he is still having pain in his wrist at that time will get wrist films  Diagnoses and all orders for this visit:    Traumatic complete tear of left rotator cuff, initial encounter  -     Cancel: MRI arthrogram left shoulder; Future  -     MRI arthrogram left shoulder; Future          Subjective:      Patient ID: Jm Amezquita is a 36 y o  male  This is a 48-year-old fellow who works as a   He was throwing trash on Friday when he sustained immediate onset of injury to his left shoulder and elbow  Actually since that point in time his elbows gotten better but he has pain toward his wrist   His main issue is his left shoulder  He states he has lot of pain with any significant motion or effort of her shoulder  He states he had a rotator cuff injury in the past and that is very similar to this  The following portions of the patient's history were reviewed and updated as appropriate: allergies, current medications, past family history, past medical history, past social history, past surgical history, and problem list     Review of Systems      Objective:      /80   Pulse 68   Ht 5' 10" (1 778 m)   Wt 111 kg (244 lb)   SpO2 99%   BMI 35 01 kg/m²          Physical Exam      On physical examination his external rotation is strong but is internal rotation and testing his supraspinatus shows lot of pain and discomfort  He has positive SLAP signs  His alignment looks good  He has a positive apprehension test   He has lot of pain on motion of her shoulder        In terms of his wrist he has some very vague tenderness over the flexor carpi radialis tendon proximal to the wrist   There is no sign of any other problems there   He has good range of motion  No sign of penetrating trauma no sign of any sepsis  Review of outside radiographs does not demonstrate any abnormality

## 2022-08-31 ENCOUNTER — HOSPITAL ENCOUNTER (OUTPATIENT)
Dept: RADIOLOGY | Facility: HOSPITAL | Age: 42
Discharge: HOME | End: 2022-08-31
Payer: COMMERCIAL

## 2022-08-31 ENCOUNTER — HOSPITAL ENCOUNTER (OUTPATIENT)
Dept: MRI IMAGING | Facility: HOSPITAL | Age: 42
Discharge: HOME | End: 2022-08-31
Payer: COMMERCIAL

## 2022-08-31 DIAGNOSIS — S46.012A TRAUMATIC COMPLETE TEAR OF LEFT ROTATOR CUFF, INITIAL ENCOUNTER: ICD-10-CM

## 2022-08-31 DIAGNOSIS — S05.40XA FOREIGN BODY BEHIND THE EYE, UNSPECIFIED LATERALITY: ICD-10-CM

## 2022-08-31 PROCEDURE — 23350 INJECTION FOR SHOULDER X-RAY: CPT

## 2022-08-31 PROCEDURE — G1004 CDSM NDSC: HCPCS

## 2022-08-31 PROCEDURE — 73222 MRI JOINT UPR EXTREM W/DYE: CPT

## 2022-08-31 PROCEDURE — A9585 GADOBUTROL INJECTION: HCPCS

## 2022-08-31 PROCEDURE — 77002 NEEDLE LOCALIZATION BY XRAY: CPT

## 2022-08-31 RX ORDER — SODIUM CHLORIDE 9 MG/ML
15 INJECTION INTRAVENOUS
Status: DISCONTINUED | OUTPATIENT
Start: 2022-08-31 | End: 2022-09-01 | Stop reason: HOSPADM

## 2022-08-31 RX ORDER — LIDOCAINE HYDROCHLORIDE 10 MG/ML
7 INJECTION, SOLUTION EPIDURAL; INFILTRATION; INTRACAUDAL; PERINEURAL
Status: DISCONTINUED | OUTPATIENT
Start: 2022-08-31 | End: 2022-09-01 | Stop reason: HOSPADM

## 2022-08-31 RX ADMIN — GADOBUTROL 0.2 ML: 604.72 INJECTION INTRAVENOUS at 10:38

## 2022-08-31 RX ADMIN — IOHEXOL 5 ML: 300 INJECTION, SOLUTION INTRAVENOUS at 10:38

## 2022-11-01 NOTE — TELEPHONE ENCOUNTER
Caller: Law Office    Doctor/Office: HERIBERTO    Caller asked to speak to: HERIBERTO     Call was transferred to: [id number]

## 2022-12-08 DIAGNOSIS — I16.0 HYPERTENSIVE URGENCY: ICD-10-CM

## 2022-12-08 DIAGNOSIS — I21.4 TYPE 1 NON-ST ELEVATION MYOCARDIAL INFARCTION (NSTEMI) (HCC): ICD-10-CM

## 2022-12-08 RX ORDER — LOSARTAN POTASSIUM 100 MG/1
TABLET ORAL
Qty: 90 TABLET | Refills: 3 | Status: SHIPPED | OUTPATIENT
Start: 2022-12-08

## 2022-12-11 ENCOUNTER — HOSPITAL ENCOUNTER (EMERGENCY)
Facility: HOSPITAL | Age: 42
Discharge: HOME | End: 2022-12-11
Attending: EMERGENCY MEDICINE

## 2022-12-11 ENCOUNTER — APPOINTMENT (EMERGENCY)
Dept: RADIOLOGY | Facility: HOSPITAL | Age: 42
End: 2022-12-11

## 2022-12-11 VITALS
HEIGHT: 70 IN | WEIGHT: 220 LBS | TEMPERATURE: 98.5 F | OXYGEN SATURATION: 99 % | SYSTOLIC BLOOD PRESSURE: 160 MMHG | HEART RATE: 72 BPM | BODY MASS INDEX: 31.5 KG/M2 | DIASTOLIC BLOOD PRESSURE: 114 MMHG | RESPIRATION RATE: 18 BRPM

## 2022-12-11 DIAGNOSIS — M25.531 RIGHT WRIST PAIN: Primary | ICD-10-CM

## 2022-12-11 NOTE — ED PROVIDER NOTES
History  Chief Complaint   Patient presents with   • Wrist Pain     Patient complaint of right wrist pain x 2 weeks, denies injury     This is a 37 y/o male with PMH CAD post MI, HTN, GERD who presents to the ER with right wrist pain x 2 weeks  Patient denies any known injury, heavy lifting, falls that may have caused it  He states at rest his pain is a 0, but when he moves the wrist it can go up to an 8  Pain is worse with pronation and supination  He admits to intermittent swelling over the past two weeks  He denies any numbness, tingling, bruising, redness, decreased sensation  He is left handed  Has not tried any medications or ice for this  No known allergies  Never hurt this wrist before  History provided by:  Patient   used: No    Wrist Pain  Location:  Right lateral wrist  Severity:  Moderate  Onset quality:  Gradual  Duration:  2 weeks  Timing:  Intermittent  Progression:  Waxing and waning  Chronicity:  New  Ineffective treatments:  None tried  Associated symptoms: no chest pain, no fever, no headaches, no nausea, no shortness of breath and no vomiting        Prior to Admission Medications   Prescriptions Last Dose Informant Patient Reported? Taking?    Diclofenac Sodium (VOLTAREN) 1 %   No No   Sig: Apply 2 g topically 4 (four) times a day   Patient not taking: No sig reported   amLODIPine (NORVASC) 5 mg tablet   No No   Sig: take 1 tablet by mouth once daily   aspirin (ECOTRIN LOW STRENGTH) 81 mg EC tablet   No No   Sig: Take 1 tablet (81 mg total) by mouth daily   chlorhexidine (PERIDEX) 0 12 % solution   No No   Sig: Apply 15 mL to the mouth or throat 2 (two) times a day   Patient not taking: No sig reported   losartan (COZAAR) 100 MG tablet   No No   Sig: take 1 tablet by mouth once daily   metoprolol succinate (TOPROL-XL) 25 mg 24 hr tablet   No No   Sig: take 1 tablet by mouth once daily   nicotine (NICODERM CQ) 21 mg/24 hr TD 24 hr patch   No No   Sig: Place 1 patch on the skin every 24 hours   Patient not taking: No sig reported   nitroglycerin (NITROSTAT) 0 4 mg SL tablet   No No   Sig: Place 1 tablet (0 4 mg total) under the tongue every 5 (five) minutes as needed for chest pain   Patient not taking: No sig reported   pantoprazole (PROTONIX) 40 mg tablet   No No   Sig: Take 1 tablet (40 mg total) by mouth daily in the early morning   phenol (Chloraseptic) 1 4 % mucosal liquid   No No   Sig: Apply 1 spray to the mouth or throat every 2 (two) hours as needed (sore throat)   Patient not taking: No sig reported   rosuvastatin (CRESTOR) 10 MG tablet   No No   Sig: take 1 tablet by mouth once daily   sucralfate (CARAFATE) 1 g/10 mL suspension   No No   Sig: Take 10 mL (1 g total) by mouth 4 (four) times a day as needed (upset stomach)   Patient not taking: No sig reported      Facility-Administered Medications: None       Past Medical History:   Diagnosis Date   • Chronic pain    • GERD (gastroesophageal reflux disease)    • Hypertension    • MI (myocardial infarction) ([facility] [address] )        Past Surgical History:   Procedure Laterality Date   • CARDIAC CATHETERIZATION     • FL INJECTION LEFT SHOULDER (ARTHROGRAM)  8/31/2022   • NO PAST SURGERIES         History reviewed  No pertinent family history  I have reviewed and agree with the history as documented  E-Cigarette/Vaping   • E-Cigarette Use Never User      E-Cigarette/Vaping Substances   • Nicotine No    • THC No    • CBD No    • Flavoring No    • Other No    • Unknown No      Social History     Tobacco Use   • Smoking status: Every Day     Packs/day: 1 00     Types: Cigarettes   • Smokeless tobacco: Never   Vaping Use   • Vaping Use: Never used   Substance Use Topics   • Alcohol use: Yes     Comment: social   • Drug use: Never       Review of Systems   Constitutional: Negative for chills and fever  Respiratory: Negative for chest tightness and shortness of breath  Cardiovascular: Negative for chest pain     Gastrointestinal: Negative for nausea and vomiting  Musculoskeletal: Positive for arthralgias  Negative for joint swelling  Skin: Negative for color change  Neurological: Negative for weakness, numbness and headaches  Psychiatric/Behavioral: Negative for behavioral problems and sleep disturbance  All other systems reviewed and are negative  Physical Exam  Physical Exam  Vitals and nursing note reviewed  Constitutional:       General: He is awake  Appearance: Normal appearance  He is well-developed  HENT:      Head: Normocephalic and atraumatic  Right Ear: External ear normal       Left Ear: External ear normal       Nose: Nose normal    Eyes:      General: No scleral icterus  Extraocular Movements: Extraocular movements intact  Cardiovascular:      Rate and Rhythm: Normal rate and regular rhythm  Heart sounds: Normal heart sounds, S1 normal and S2 normal  No murmur heard  No gallop  Pulmonary:      Effort: Pulmonary effort is normal       Breath sounds: Normal breath sounds  No wheezing, rhonchi or rales  Musculoskeletal:         General: Normal range of motion  Right wrist: Tenderness present  No swelling or deformity  Cervical back: Normal range of motion  Comments: Tenderness to palpation of the right lateral wrist  No associated swelling, ecchymosis, redness, or deformities noted  Increased pain with supination and pronation  No pain with flexion, extension, opposition  NV and sensation intact  Full ROM and strength  Radial pulse +2  Skin:     General: Skin is warm and dry  Neurological:      General: No focal deficit present  Mental Status: He is alert  Psychiatric:         Attention and Perception: Attention and perception normal          Mood and Affect: Mood normal          Behavior: Behavior normal  Behavior is cooperative           Vital Signs  ED Triage Vitals [12/11/22 1543]   Temperature Pulse Respirations Blood Pressure SpO2   98 5 °F (36 9 °C) 72 18 (!) 160/114 99 %      Temp src Heart Rate Source Patient Position - Orthostatic VS BP Location FiO2 (%)   -- -- -- -- --      Pain Score       --           Vitals:    12/11/22 1543   BP: (!) 160/114   Pulse: 72         Visual Acuity      ED Medications  Medications - No data to display    Diagnostic Studies  Results Reviewed     None                 XR wrist 3+ views RIGHT   ED Interpretation by Stew Camp PA-C (12/11 1614)   No acute fracture or dislocation                  Procedures  Procedures         ED Course                                             MDM  Number of Diagnoses or Management Options  Right wrist pain: new and requires workup  Diagnosis management comments: 35 y/o male here for right wrist pain x 2 weeks  No known injury   Differential diagnosis: wrist fracture, wrist sprain, tendonitis  Assessment: right wrist pain  Plan: no fracture seen on xray as read by me  Patient placed in quickfit splint and given contact info for orthopedics and informed to follow up with them  He was given care instructions for wrist pain  He  was given strict return to ER precautions both verbally and in discharge papers  Patient verbalized understanding and agrees with plan  Amount and/or Complexity of Data Reviewed  Tests in the radiology section of CPT®: ordered and reviewed    Risk of Complications, Morbidity, and/or Mortality  Presenting problems: low  Diagnostic procedures: low  Management options: low    Patient Progress  Patient progress: stable      Disposition  Final diagnoses:   Right wrist pain     Time reflects when diagnosis was documented in both MDM as applicable and the Disposition within this note     Time User Action Codes Description Comment    12/11/2022  4:23 PM Cassandra Bentley Right wrist pain       ED Disposition     ED Disposition   Discharge    Condition   Stable    Date/Time   Sun Dec 11, 2022  4:23 PM    Comment   Myelias Friend discharge to home/self care  Follow-up Information     Follow up With Specialties Details Why Contact Info Additional [address] [facility] [place] [facility] Schedule an appointment as soon as possible for a visit   [address] [id number] [facility] [id number]  [facility] [address] [facility] [place], [address], [facility] [place], [place], [address]     [address] Emergency Department Emergency Medicine Go to  if you begin to develop intense pain in your wrist that is worse or different than before, cant move fingers or they turn numb, cold, blue, red hot, swollen, red streaking from wrist, fevers, chest pain, shortness of breath [address] [facility] Emergency Department, [address] [place], [place], [address]          Discharge Medication List as of 12/11/2022  4:24 PM      CONTINUE these medications which have NOT CHANGED    Details   amLODIPine (NORVASC) 5 mg tablet take 1 tablet by mouth once daily, Normal      aspirin (ECOTRIN LOW STRENGTH) 81 mg EC tablet Take 1 tablet (81 mg total) by mouth daily, Starting Fri 9/4/2020, Normal      chlorhexidine (PERIDEX) 0 12 % solution Apply 15 mL to the mouth or throat 2 (two) times a day, Starting Tue 9/21/2021, Normal      Diclofenac Sodium (VOLTAREN) 1 % Apply 2 g topically 4 (four) times a day, Starting Tue 5/3/2022, Normal      losartan (COZAAR) 100 MG tablet take 1 tablet by mouth once daily, Normal      metoprolol succinate (TOPROL-XL) 25 mg 24 hr tablet take 1 tablet by mouth once daily, Normal      nicotine (NICODERM CQ) 21 mg/24 hr TD 24 hr patch Place 1 patch on the skin every 24 hours, Starting Sat 10/2/2021, Normal      nitroglycerin (NITROSTAT) 0 4 mg SL tablet Place 1 tablet (0 4 mg total) under the tongue every 5 (five) minutes as needed for chest pain, Starting Fri 9/4/2020, Normal      pantoprazole (PROTONIX) 40 mg tablet Take 1 tablet (40 mg total) by mouth daily in the early morning, Starting Fri 1/14/2022, Normal      phenol (Chloraseptic) 1 4 % mucosal liquid Apply 1 spray to the mouth or throat every 2 (two) hours as needed (sore throat), Starting Sat 10/2/2021, Normal      rosuvastatin (CRESTOR) 10 MG tablet take 1 tablet by mouth once daily, Normal      sucralfate (CARAFATE) 1 g/10 mL suspension Take 10 mL (1 g total) by mouth 4 (four) times a day as needed (upset stomach), Starting Sat 10/2/2021, Normal             No discharge procedures on file      PDMP Review       Value Time User    PDMP Reviewed  Yes 2/11/2021  1:47 PM Олег Dewitt DO          ED Provider  Electronically Signed by           Glynn Grijalva PA-C  12/11/22 9413

## 2022-12-11 NOTE — DISCHARGE INSTRUCTIONS
RICE - rest, ice, compression (splint), elevation  Take ibuprofen or tylenol every 4-6 hours for pain   Schedule an appointment with the orthopedist for follow up evaluation  Return to the ER if you begin to develop intense pain in your wrist that is worse or different than before, cant move fingers or they turn numb, cold, blue, red hot, swollen, red streaking from wrist, fevers, chest pain, shortness of breath

## 2023-02-01 DIAGNOSIS — K21.9 GASTROESOPHAGEAL REFLUX DISEASE WITHOUT ESOPHAGITIS: ICD-10-CM

## 2023-02-01 RX ORDER — PANTOPRAZOLE SODIUM 40 MG/1
40 TABLET, DELAYED RELEASE ORAL
Qty: 90 TABLET | Refills: 3 | Status: SHIPPED | OUTPATIENT
Start: 2023-02-01

## 2023-02-01 NOTE — TELEPHONE ENCOUNTER
Patient walked in this afternoon requesting a refill of Pantoprazole 40mg to be sent to his pharmacy in chart    Scheduled patient a follow up appointment for 4/26/2023 with Dr Jhonny Cabrera

## 2023-03-17 DIAGNOSIS — K21.9 GASTROESOPHAGEAL REFLUX DISEASE WITHOUT ESOPHAGITIS: ICD-10-CM

## 2023-03-17 RX ORDER — PANTOPRAZOLE SODIUM 40 MG/1
40 TABLET, DELAYED RELEASE ORAL
Qty: 90 TABLET | Refills: 3 | Status: SHIPPED | OUTPATIENT
Start: 2023-03-17

## 2023-03-17 NOTE — TELEPHONE ENCOUNTER
Patient is away in [place] and is requesting a refill of his "Acid Reflux" medication to be sent to:     [facility]  Indira Herring  [phone]

## 2023-03-23 DIAGNOSIS — I16.0 HYPERTENSIVE URGENCY: ICD-10-CM

## 2023-03-23 DIAGNOSIS — I25.10 CORONARY ARTERY DISEASE INVOLVING NATIVE CORONARY ARTERY OF NATIVE HEART WITHOUT ANGINA PECTORIS: ICD-10-CM

## 2023-03-23 DIAGNOSIS — E78.2 MIXED HYPERLIPIDEMIA: ICD-10-CM

## 2023-03-23 DIAGNOSIS — I10 ESSENTIAL HYPERTENSION: ICD-10-CM

## 2023-03-23 DIAGNOSIS — I21.4 TYPE 1 NON-ST ELEVATION MYOCARDIAL INFARCTION (NSTEMI) (HCC): ICD-10-CM

## 2023-03-23 RX ORDER — ROSUVASTATIN CALCIUM 10 MG/1
10 TABLET, COATED ORAL DAILY
Qty: 90 TABLET | Refills: 3 | Status: SHIPPED | OUTPATIENT
Start: 2023-03-23

## 2023-03-23 RX ORDER — LOSARTAN POTASSIUM 100 MG/1
100 TABLET ORAL DAILY
Qty: 90 TABLET | Refills: 3 | Status: SHIPPED | OUTPATIENT
Start: 2023-03-23

## 2023-03-23 RX ORDER — AMLODIPINE BESYLATE 5 MG/1
5 TABLET ORAL DAILY
Qty: 90 TABLET | Refills: 3 | Status: SHIPPED | OUTPATIENT
Start: 2023-03-23

## 2023-03-23 RX ORDER — METOPROLOL SUCCINATE 25 MG/1
25 TABLET, EXTENDED RELEASE ORAL DAILY
Qty: 90 TABLET | Refills: 3 | Status: SHIPPED | OUTPATIENT
Start: 2023-03-23

## 2024-03-16 DIAGNOSIS — K21.9 GASTROESOPHAGEAL REFLUX DISEASE WITHOUT ESOPHAGITIS: ICD-10-CM

## 2024-03-18 RX ORDER — PANTOPRAZOLE SODIUM 40 MG/1
TABLET, DELAYED RELEASE ORAL
Qty: 90 TABLET | Refills: 0 | Status: SHIPPED | OUTPATIENT
Start: 2024-03-18

## 2024-06-12 DIAGNOSIS — K21.9 GASTROESOPHAGEAL REFLUX DISEASE WITHOUT ESOPHAGITIS: ICD-10-CM

## 2024-06-13 NOTE — TELEPHONE ENCOUNTER
Requested medication(s) are due for refill today: Yes  Patient has already received a courtesy refill: Yes  Other reason request has been forwarded to provider: Per refill pod, Rx needs to be reviewed by provider. Message has been sent to clerical to reach pt to schedule appt.

## 2024-06-14 ENCOUNTER — TELEPHONE (OUTPATIENT)
Dept: CARDIOLOGY CLINIC | Facility: CLINIC | Age: 44
End: 2024-06-14

## 2024-06-25 RX ORDER — PANTOPRAZOLE SODIUM 40 MG/1
TABLET, DELAYED RELEASE ORAL
Qty: 90 TABLET | Refills: 0 | Status: SHIPPED | OUTPATIENT
Start: 2024-06-25

## 2025-03-16 NOTE — EMTALA/ACUTE CARE TRANSFER
[facility] MED SURG UNIT  [address] [place] [address]  Dept: [phone]      ACUTE CARE TRANSFER CONSENT    NAME Jose Valiente                                         1980                              MRN [id number]    I have been informed of my rights regarding examination, treatment, and transfer   by Dr Jerson Venegas MD    Benefits:   for cardiac catheterization    Risks:   deterioration of condition during transfer      Consent for Transfer:  I acknowledge that my medical condition has been evaluated and explained to me by the treating physician or other qualified medical person and/or my attending physician, who has recommended that I be transferred to the service of    at    The above potential benefits of such transfer, the potential risks associated with such transfer, and the probable risks of not being transferred have been explained to me, and I fully understand them  The doctor has explained that, in my case, the benefits of transfer outweigh the risks  I agree to be transferred  I authorize the performance of emergency medical procedures and treatments upon me in both transit and upon arrival at the receiving facility  Additionally, I authorize the release of any and all medical records to the receiving facility and request they be transported with me, if possible  I understand that the safest mode of transportation during a medical emergency is an ambulance and that the Hospital advocates the use of this mode of transport  Risks of traveling to the receiving facility by car, including absence of medical control, life sustaining equipment, such as oxygen, and medical personnel has been explained to me and I fully understand them  (RAFAELA CORRECT BOX BELOW)  [  ]  I consent to the stated transfer and to be transported by ambulance/helicopter    [  ]  I consent to the stated transfer, but refuse transportation by ambulance and accept full responsibility for my transportation by car  I understand the risks of non-ambulance transfers and I exonerate the Hospital and its staff from any deterioration in my condition that results from this refusal     X___________________________________________    DATE  20  TIME________  Signature of patient or legally responsible individual signing on patient behalf           RELATIONSHIP TO PATIENT_________________________          Provider Certification    NAME Akua Mendoza                                         1980                              MRN [id number]    A medical screening exam was performed on the above named patient  Based on the examination:    Condition Necessitating Transfer non STEMI type 2    Patient Condition:   stable    Reason for Transfer:   for cardiac catheterization    Transfer Requirements: [address] [place] [address]  · Space available and qualified personnel available for treatment as acknowledged by    · Agreed to accept transfer and to provide appropriate medical treatment as acknowledged by         Dr Jose Foley  · Appropriate medical records of the examination and treatment of the patient are provided at the time of transfer   [address] [facility] [address] _______  · Transfer will be performed by qualified personnel from    and appropriate transfer equipment as required, including the use of necessary and appropriate life support measures      Provider Certification: I have examined the patient and explained the following risks and benefits of being transferred/refusing transfer to the patient/family:    explained to patient at length      Based on these reasonable risks and benefits to the patient and/or the unborn child(tremaine), and based upon the information available at the time of the patients examination, I certify that the medical benefits reasonably to be expected from the provision of appropriate medical treatments at another medical facility outweigh the increasing risks, if any, to the individuals medical condition, and in the case of labor to the unborn child, from effecting the transfer      X____________________________________________ DATE 09/02/20        TIME_______      ORIGINAL - SEND TO MEDICAL RECORDS   COPY - SEND WITH PATIENT DURING TRANSFER not motivated to quit None